# Patient Record
Sex: MALE | Race: WHITE | NOT HISPANIC OR LATINO | Employment: OTHER | URBAN - METROPOLITAN AREA
[De-identification: names, ages, dates, MRNs, and addresses within clinical notes are randomized per-mention and may not be internally consistent; named-entity substitution may affect disease eponyms.]

---

## 2023-03-20 ENCOUNTER — TELEPHONE (OUTPATIENT)
Dept: PRIMARY CARE | Facility: CLINIC | Age: 68
End: 2023-03-20
Payer: COMMERCIAL

## 2023-03-20 DIAGNOSIS — I10 HYPERTENSION, UNSPECIFIED TYPE: Primary | ICD-10-CM

## 2023-03-20 RX ORDER — LOSARTAN POTASSIUM 100 MG/1
50 TABLET ORAL DAILY
Qty: 90 TABLET | Refills: 3 | Status: SHIPPED | OUTPATIENT
Start: 2023-03-20 | End: 2023-05-30

## 2023-04-07 DIAGNOSIS — E78.5 HYPERLIPIDEMIA, UNSPECIFIED HYPERLIPIDEMIA TYPE: Primary | ICD-10-CM

## 2023-04-07 RX ORDER — ATORVASTATIN CALCIUM 40 MG/1
40 TABLET, FILM COATED ORAL NIGHTLY
COMMUNITY
End: 2023-04-07 | Stop reason: SDUPTHER

## 2023-04-08 RX ORDER — ATORVASTATIN CALCIUM 40 MG/1
40 TABLET, FILM COATED ORAL NIGHTLY
Qty: 90 TABLET | Refills: 0 | Status: SHIPPED | OUTPATIENT
Start: 2023-04-08 | End: 2023-10-02

## 2023-05-18 ENCOUNTER — APPOINTMENT (OUTPATIENT)
Dept: PRIMARY CARE | Facility: CLINIC | Age: 68
End: 2023-05-18
Payer: COMMERCIAL

## 2023-05-22 ENCOUNTER — TELEPHONE (OUTPATIENT)
Dept: PRIMARY CARE | Facility: CLINIC | Age: 68
End: 2023-05-22
Payer: COMMERCIAL

## 2023-05-22 DIAGNOSIS — Z00.00 WELL ADULT EXAM: Primary | ICD-10-CM

## 2023-05-24 PROBLEM — R00.1 BRADYCARDIA: Status: ACTIVE | Noted: 2023-05-24

## 2023-05-24 PROBLEM — E78.5 HYPERLIPIDEMIA: Status: ACTIVE | Noted: 2023-05-24

## 2023-05-24 PROBLEM — K21.9 GERD WITHOUT ESOPHAGITIS: Status: ACTIVE | Noted: 2023-05-24

## 2023-05-24 PROBLEM — M54.31 RIGHT SIDED SCIATICA: Status: ACTIVE | Noted: 2023-05-24

## 2023-05-24 PROBLEM — I25.10 CAD (CORONARY ARTERY DISEASE): Status: ACTIVE | Noted: 2023-05-24

## 2023-05-24 PROBLEM — L08.9 SKIN INFECTION: Status: ACTIVE | Noted: 2023-05-24

## 2023-05-24 PROBLEM — U07.1 COVID-19: Status: ACTIVE | Noted: 2023-05-24

## 2023-05-24 PROBLEM — J01.41 ACUTE RECURRENT PANSINUSITIS: Status: ACTIVE | Noted: 2023-05-24

## 2023-05-24 PROBLEM — I10 BENIGN ESSENTIAL HYPERTENSION: Status: ACTIVE | Noted: 2023-05-24

## 2023-05-24 PROBLEM — R07.9 CHEST PAIN: Status: ACTIVE | Noted: 2023-05-24

## 2023-05-24 PROBLEM — I48.91 A-FIB (MULTI): Status: ACTIVE | Noted: 2023-05-24

## 2023-05-24 RX ORDER — OXYCODONE HYDROCHLORIDE 5 MG/1
TABLET ORAL EVERY 4 HOURS PRN
COMMUNITY
Start: 2019-11-09 | End: 2023-05-30 | Stop reason: ALTCHOICE

## 2023-05-24 RX ORDER — LISINOPRIL 10 MG/1
10 TABLET ORAL DAILY
COMMUNITY
End: 2023-06-28 | Stop reason: SDUPTHER

## 2023-05-24 RX ORDER — ASPIRIN 81 MG/1
1 TABLET ORAL DAILY
COMMUNITY
End: 2024-01-23 | Stop reason: WASHOUT

## 2023-05-24 RX ORDER — ACETAMINOPHEN 325 MG/1
TABLET ORAL EVERY 6 HOURS PRN
COMMUNITY
Start: 2019-11-09 | End: 2023-08-16 | Stop reason: ALTCHOICE

## 2023-05-27 ENCOUNTER — LAB (OUTPATIENT)
Dept: LAB | Facility: LAB | Age: 68
End: 2023-05-27
Payer: COMMERCIAL

## 2023-05-27 DIAGNOSIS — Z00.00 WELL ADULT EXAM: ICD-10-CM

## 2023-05-27 LAB
ALANINE AMINOTRANSFERASE (SGPT) (U/L) IN SER/PLAS: 24 U/L (ref 10–52)
ALBUMIN (G/DL) IN SER/PLAS: 4.3 G/DL (ref 3.4–5)
ALKALINE PHOSPHATASE (U/L) IN SER/PLAS: 53 U/L (ref 33–136)
ANION GAP IN SER/PLAS: 10 MMOL/L (ref 10–20)
ASPARTATE AMINOTRANSFERASE (SGOT) (U/L) IN SER/PLAS: 23 U/L (ref 9–39)
BASOPHILS (10*3/UL) IN BLOOD BY AUTOMATED COUNT: 0.03 X10E9/L (ref 0–0.1)
BASOPHILS/100 LEUKOCYTES IN BLOOD BY AUTOMATED COUNT: 0.6 % (ref 0–2)
BILIRUBIN TOTAL (MG/DL) IN SER/PLAS: 0.9 MG/DL (ref 0–1.2)
CALCIUM (MG/DL) IN SER/PLAS: 9.2 MG/DL (ref 8.6–10.3)
CARBON DIOXIDE, TOTAL (MMOL/L) IN SER/PLAS: 31 MMOL/L (ref 21–32)
CHLORIDE (MMOL/L) IN SER/PLAS: 104 MMOL/L (ref 98–107)
CHOLESTEROL (MG/DL) IN SER/PLAS: 106 MG/DL (ref 0–199)
CHOLESTEROL IN HDL (MG/DL) IN SER/PLAS: 56 MG/DL
CHOLESTEROL/HDL RATIO: 1.9
CREATININE (MG/DL) IN SER/PLAS: 0.94 MG/DL (ref 0.5–1.3)
EOSINOPHILS (10*3/UL) IN BLOOD BY AUTOMATED COUNT: 0.18 X10E9/L (ref 0–0.7)
EOSINOPHILS/100 LEUKOCYTES IN BLOOD BY AUTOMATED COUNT: 3.7 % (ref 0–6)
ERYTHROCYTE DISTRIBUTION WIDTH (RATIO) BY AUTOMATED COUNT: 12.4 % (ref 11.5–14.5)
ERYTHROCYTE MEAN CORPUSCULAR HEMOGLOBIN CONCENTRATION (G/DL) BY AUTOMATED: 32.9 G/DL (ref 32–36)
ERYTHROCYTE MEAN CORPUSCULAR VOLUME (FL) BY AUTOMATED COUNT: 97 FL (ref 80–100)
ERYTHROCYTES (10*6/UL) IN BLOOD BY AUTOMATED COUNT: 4.81 X10E12/L (ref 4.5–5.9)
GFR MALE: 89 ML/MIN/1.73M2
GLUCOSE (MG/DL) IN SER/PLAS: 85 MG/DL (ref 74–99)
HEMATOCRIT (%) IN BLOOD BY AUTOMATED COUNT: 46.8 % (ref 41–52)
HEMOGLOBIN (G/DL) IN BLOOD: 15.4 G/DL (ref 13.5–17.5)
IMMATURE GRANULOCYTES/100 LEUKOCYTES IN BLOOD BY AUTOMATED COUNT: 0.2 % (ref 0–0.9)
LDL: 28 MG/DL (ref 0–99)
LEUKOCYTES (10*3/UL) IN BLOOD BY AUTOMATED COUNT: 4.9 X10E9/L (ref 4.4–11.3)
LYMPHOCYTES (10*3/UL) IN BLOOD BY AUTOMATED COUNT: 1.68 X10E9/L (ref 1.2–4.8)
LYMPHOCYTES/100 LEUKOCYTES IN BLOOD BY AUTOMATED COUNT: 34.2 % (ref 13–44)
MONOCYTES (10*3/UL) IN BLOOD BY AUTOMATED COUNT: 0.58 X10E9/L (ref 0.1–1)
MONOCYTES/100 LEUKOCYTES IN BLOOD BY AUTOMATED COUNT: 11.8 % (ref 2–10)
MUCUS, URINE: NORMAL /LPF
NEUTROPHILS (10*3/UL) IN BLOOD BY AUTOMATED COUNT: 2.43 X10E9/L (ref 1.2–7.7)
NEUTROPHILS/100 LEUKOCYTES IN BLOOD BY AUTOMATED COUNT: 49.5 % (ref 40–80)
NRBC (PER 100 WBCS) BY AUTOMATED COUNT: 0 /100 WBC (ref 0–0)
PLATELETS (10*3/UL) IN BLOOD AUTOMATED COUNT: 184 X10E9/L (ref 150–450)
POTASSIUM (MMOL/L) IN SER/PLAS: 5.7 MMOL/L (ref 3.5–5.3)
PROTEIN TOTAL: 5.8 G/DL (ref 6.4–8.2)
RBC, URINE: <1 /HPF (ref 0–5)
SODIUM (MMOL/L) IN SER/PLAS: 139 MMOL/L (ref 136–145)
TRIGLYCERIDE (MG/DL) IN SER/PLAS: 110 MG/DL (ref 0–149)
UREA NITROGEN (MG/DL) IN SER/PLAS: 19 MG/DL (ref 6–23)
VLDL: 22 MG/DL (ref 0–40)
WBC, URINE: 1 /HPF (ref 0–5)

## 2023-05-27 PROCEDURE — 80061 LIPID PANEL: CPT

## 2023-05-27 PROCEDURE — 85025 COMPLETE CBC W/AUTO DIFF WBC: CPT

## 2023-05-27 PROCEDURE — 84153 ASSAY OF PSA TOTAL: CPT

## 2023-05-27 PROCEDURE — 36415 COLL VENOUS BLD VENIPUNCTURE: CPT

## 2023-05-27 PROCEDURE — 80053 COMPREHEN METABOLIC PANEL: CPT

## 2023-05-27 PROCEDURE — 81001 URINALYSIS AUTO W/SCOPE: CPT

## 2023-05-28 LAB — PROSTATE SPECIFIC AG (NG/ML) IN SER/PLAS: 1.43 NG/ML (ref 0–4)

## 2023-05-30 ENCOUNTER — OFFICE VISIT (OUTPATIENT)
Dept: PRIMARY CARE | Facility: CLINIC | Age: 68
End: 2023-05-30
Payer: COMMERCIAL

## 2023-05-30 VITALS
HEIGHT: 71 IN | BODY MASS INDEX: 26.99 KG/M2 | DIASTOLIC BLOOD PRESSURE: 76 MMHG | WEIGHT: 192.8 LBS | SYSTOLIC BLOOD PRESSURE: 124 MMHG | OXYGEN SATURATION: 96 % | TEMPERATURE: 97.9 F | HEART RATE: 64 BPM

## 2023-05-30 DIAGNOSIS — I10 BENIGN ESSENTIAL HYPERTENSION: ICD-10-CM

## 2023-05-30 DIAGNOSIS — E78.5 HYPERLIPIDEMIA, UNSPECIFIED HYPERLIPIDEMIA TYPE: ICD-10-CM

## 2023-05-30 DIAGNOSIS — I25.118 CORONARY ARTERY DISEASE INVOLVING NATIVE CORONARY ARTERY OF NATIVE HEART WITH OTHER FORM OF ANGINA PECTORIS (CMS-HCC): ICD-10-CM

## 2023-05-30 DIAGNOSIS — Z00.00 MEDICARE ANNUAL WELLNESS VISIT, SUBSEQUENT: Primary | ICD-10-CM

## 2023-05-30 PROBLEM — K63.5 POLYP OF COLON: Status: ACTIVE | Noted: 2023-05-30

## 2023-05-30 PROBLEM — K21.9 GASTROESOPHAGEAL REFLUX DISEASE: Status: ACTIVE | Noted: 2023-05-30

## 2023-05-30 PROBLEM — K21.9 ESOPHAGEAL REFLUX: Status: ACTIVE | Noted: 2023-05-30

## 2023-05-30 PROCEDURE — 3078F DIAST BP <80 MM HG: CPT | Performed by: FAMILY MEDICINE

## 2023-05-30 PROCEDURE — 3074F SYST BP LT 130 MM HG: CPT | Performed by: FAMILY MEDICINE

## 2023-05-30 PROCEDURE — 99397 PER PM REEVAL EST PAT 65+ YR: CPT | Performed by: FAMILY MEDICINE

## 2023-05-30 PROCEDURE — 1159F MED LIST DOCD IN RCRD: CPT | Performed by: FAMILY MEDICINE

## 2023-05-30 PROCEDURE — G0439 PPPS, SUBSEQ VISIT: HCPCS | Performed by: FAMILY MEDICINE

## 2023-05-30 PROCEDURE — 1036F TOBACCO NON-USER: CPT | Performed by: FAMILY MEDICINE

## 2023-05-30 PROCEDURE — 3008F BODY MASS INDEX DOCD: CPT | Performed by: FAMILY MEDICINE

## 2023-05-30 PROCEDURE — 1160F RVW MEDS BY RX/DR IN RCRD: CPT | Performed by: FAMILY MEDICINE

## 2023-05-30 PROCEDURE — 99497 ADVNCD CARE PLAN 30 MIN: CPT | Performed by: FAMILY MEDICINE

## 2023-05-30 RX ORDER — ATENOLOL 25 MG/1
1 TABLET ORAL DAILY
COMMUNITY
Start: 2019-01-15 | End: 2023-05-30 | Stop reason: ALTCHOICE

## 2023-05-30 SDOH — ECONOMIC STABILITY: INCOME INSECURITY: IN THE LAST 12 MONTHS, WAS THERE A TIME WHEN YOU WERE NOT ABLE TO PAY THE MORTGAGE OR RENT ON TIME?: NO

## 2023-05-30 SDOH — ECONOMIC STABILITY: HOUSING INSECURITY
IN THE LAST 12 MONTHS, WAS THERE A TIME WHEN YOU DID NOT HAVE A STEADY PLACE TO SLEEP OR SLEPT IN A SHELTER (INCLUDING NOW)?: NO

## 2023-05-30 SDOH — ECONOMIC STABILITY: FOOD INSECURITY: WITHIN THE PAST 12 MONTHS, THE FOOD YOU BOUGHT JUST DIDN'T LAST AND YOU DIDN'T HAVE MONEY TO GET MORE.: NEVER TRUE

## 2023-05-30 SDOH — ECONOMIC STABILITY: TRANSPORTATION INSECURITY
IN THE PAST 12 MONTHS, HAS LACK OF TRANSPORTATION KEPT YOU FROM MEETINGS, WORK, OR FROM GETTING THINGS NEEDED FOR DAILY LIVING?: NO

## 2023-05-30 SDOH — ECONOMIC STABILITY: FOOD INSECURITY: WITHIN THE PAST 12 MONTHS, YOU WORRIED THAT YOUR FOOD WOULD RUN OUT BEFORE YOU GOT MONEY TO BUY MORE.: NEVER TRUE

## 2023-05-30 SDOH — ECONOMIC STABILITY: TRANSPORTATION INSECURITY
IN THE PAST 12 MONTHS, HAS THE LACK OF TRANSPORTATION KEPT YOU FROM MEDICAL APPOINTMENTS OR FROM GETTING MEDICATIONS?: NO

## 2023-05-30 ASSESSMENT — SOCIAL DETERMINANTS OF HEALTH (SDOH)
WITHIN THE LAST YEAR, HAVE YOU BEEN AFRAID OF YOUR PARTNER OR EX-PARTNER?: NO
WITHIN THE LAST YEAR, HAVE YOU BEEN KICKED, HIT, SLAPPED, OR OTHERWISE PHYSICALLY HURT BY YOUR PARTNER OR EX-PARTNER?: NO
WITHIN THE LAST YEAR, HAVE TO BEEN RAPED OR FORCED TO HAVE ANY KIND OF SEXUAL ACTIVITY BY YOUR PARTNER OR EX-PARTNER?: NO
HOW HARD IS IT FOR YOU TO PAY FOR THE VERY BASICS LIKE FOOD, HOUSING, MEDICAL CARE, AND HEATING?: NOT HARD AT ALL
WITHIN THE LAST YEAR, HAVE YOU BEEN HUMILIATED OR EMOTIONALLY ABUSED IN OTHER WAYS BY YOUR PARTNER OR EX-PARTNER?: NO

## 2023-05-30 ASSESSMENT — LIFESTYLE VARIABLES
HOW MANY STANDARD DRINKS CONTAINING ALCOHOL DO YOU HAVE ON A TYPICAL DAY: PATIENT DOES NOT DRINK
HOW OFTEN DO YOU HAVE SIX OR MORE DRINKS ON ONE OCCASION: NEVER
AUDIT-C TOTAL SCORE: 0
SKIP TO QUESTIONS 9-10: 1
HOW OFTEN DO YOU HAVE A DRINK CONTAINING ALCOHOL: NEVER

## 2023-05-30 ASSESSMENT — ENCOUNTER SYMPTOMS
MUSCULOSKELETAL NEGATIVE: 1
NEUROLOGICAL NEGATIVE: 1
OCCASIONAL FEELINGS OF UNSTEADINESS: 0
PSYCHIATRIC NEGATIVE: 1
DEPRESSION: 0
GASTROINTESTINAL NEGATIVE: 1
CONSTITUTIONAL NEGATIVE: 1
RESPIRATORY NEGATIVE: 1
LOSS OF SENSATION IN FEET: 0
CARDIOVASCULAR NEGATIVE: 1

## 2023-05-30 ASSESSMENT — PAIN SCALES - GENERAL: PAINLEVEL: 0-NO PAIN

## 2023-05-30 ASSESSMENT — PATIENT HEALTH QUESTIONNAIRE - PHQ9
2. FEELING DOWN, DEPRESSED OR HOPELESS: NOT AT ALL
SUM OF ALL RESPONSES TO PHQ9 QUESTIONS 1 & 2: 0
1. LITTLE INTEREST OR PLEASURE IN DOING THINGS: NOT AT ALL

## 2023-05-30 NOTE — PROGRESS NOTES
"Subjective   Patient ID: Prosper Mitchell is a 67 y.o. male who presents for Annual Exam (Annual medicare wellness bw already done).    HPI     Review of Systems   Constitutional: Negative.    HENT: Negative.     Respiratory: Negative.     Cardiovascular: Negative.    Gastrointestinal: Negative.    Genitourinary: Negative.    Musculoskeletal: Negative.    Neurological: Negative.    Psychiatric/Behavioral: Negative.         Objective   /76 (BP Location: Left arm)   Pulse 64   Temp 36.6 °C (97.9 °F) (Temporal)   Ht 1.791 m (5' 10.5\")   Wt 87.5 kg (192 lb 12.8 oz)   SpO2 96%   BMI 27.27 kg/m²     Physical Exam  Vitals and nursing note reviewed.   HENT:      Right Ear: Tympanic membrane normal.      Left Ear: Tympanic membrane normal.   Cardiovascular:      Rate and Rhythm: Normal rate and regular rhythm.      Pulses: Normal pulses.      Heart sounds: Normal heart sounds.   Pulmonary:      Breath sounds: Normal breath sounds.   Musculoskeletal:         General: Normal range of motion.   Neurological:      General: No focal deficit present.      Mental Status: He is alert and oriented to person, place, and time.   Psychiatric:         Mood and Affect: Mood normal.         Assessment/Plan   Problem List Items Addressed This Visit          Circulatory    Benign essential hypertension    CAD (coronary artery disease)       Other    Hyperlipidemia     Other Visit Diagnoses       Medicare annual wellness visit, subsequent    -  Primary    BMI 27.0-27.9,adult                   "

## 2023-06-28 DIAGNOSIS — I10 BENIGN ESSENTIAL HYPERTENSION: Primary | ICD-10-CM

## 2023-06-29 RX ORDER — LISINOPRIL 10 MG/1
10 TABLET ORAL DAILY
Qty: 90 TABLET | Refills: 0 | Status: SHIPPED | OUTPATIENT
Start: 2023-06-29 | End: 2023-08-03

## 2023-08-02 DIAGNOSIS — I10 BENIGN ESSENTIAL HYPERTENSION: ICD-10-CM

## 2023-08-03 RX ORDER — LISINOPRIL 10 MG/1
10 TABLET ORAL DAILY
Qty: 90 TABLET | Refills: 0 | Status: SHIPPED | OUTPATIENT
Start: 2023-08-03 | End: 2023-11-06

## 2023-08-16 ENCOUNTER — OFFICE VISIT (OUTPATIENT)
Dept: PRIMARY CARE | Facility: CLINIC | Age: 68
End: 2023-08-16
Payer: COMMERCIAL

## 2023-08-16 VITALS
WEIGHT: 190.6 LBS | BODY MASS INDEX: 26.96 KG/M2 | DIASTOLIC BLOOD PRESSURE: 62 MMHG | HEART RATE: 80 BPM | SYSTOLIC BLOOD PRESSURE: 122 MMHG | OXYGEN SATURATION: 97 %

## 2023-08-16 DIAGNOSIS — M62.830 SPASM OF THORACIC BACK MUSCLE: Primary | ICD-10-CM

## 2023-08-16 PROCEDURE — 1125F AMNT PAIN NOTED PAIN PRSNT: CPT | Performed by: STUDENT IN AN ORGANIZED HEALTH CARE EDUCATION/TRAINING PROGRAM

## 2023-08-16 PROCEDURE — 3078F DIAST BP <80 MM HG: CPT | Performed by: STUDENT IN AN ORGANIZED HEALTH CARE EDUCATION/TRAINING PROGRAM

## 2023-08-16 PROCEDURE — 3074F SYST BP LT 130 MM HG: CPT | Performed by: STUDENT IN AN ORGANIZED HEALTH CARE EDUCATION/TRAINING PROGRAM

## 2023-08-16 PROCEDURE — 1160F RVW MEDS BY RX/DR IN RCRD: CPT | Performed by: STUDENT IN AN ORGANIZED HEALTH CARE EDUCATION/TRAINING PROGRAM

## 2023-08-16 PROCEDURE — 1159F MED LIST DOCD IN RCRD: CPT | Performed by: STUDENT IN AN ORGANIZED HEALTH CARE EDUCATION/TRAINING PROGRAM

## 2023-08-16 PROCEDURE — 3008F BODY MASS INDEX DOCD: CPT | Performed by: STUDENT IN AN ORGANIZED HEALTH CARE EDUCATION/TRAINING PROGRAM

## 2023-08-16 PROCEDURE — 1036F TOBACCO NON-USER: CPT | Performed by: STUDENT IN AN ORGANIZED HEALTH CARE EDUCATION/TRAINING PROGRAM

## 2023-08-16 PROCEDURE — 99213 OFFICE O/P EST LOW 20 MIN: CPT | Performed by: STUDENT IN AN ORGANIZED HEALTH CARE EDUCATION/TRAINING PROGRAM

## 2023-08-16 RX ORDER — CYCLOBENZAPRINE HCL 10 MG
10 TABLET ORAL NIGHTLY PRN
Qty: 30 TABLET | Refills: 0 | Status: SHIPPED | OUTPATIENT
Start: 2023-08-16 | End: 2023-10-28 | Stop reason: ALTCHOICE

## 2023-08-16 ASSESSMENT — PAIN SCALES - GENERAL: PAINLEVEL: 4

## 2023-08-16 NOTE — PATIENT INSTRUCTIONS
1.  Midthoracic back spasms likely from somatic dysfunction of mid thoracic spine.  Would advise on anti-inflammatory Advil or Aleve or ibuprofen 2-3 times per day.  Can take muscle relaxer 10 mg p.o. nightly for spasms.  Advised on stretches and exercises as per handout twice per day.  If no improvement in the next 2 to 3 weeks please call we will consider x-rays of thoracic spine

## 2023-10-02 ENCOUNTER — TELEPHONE (OUTPATIENT)
Dept: CARDIOLOGY | Facility: CLINIC | Age: 68
End: 2023-10-02
Payer: COMMERCIAL

## 2023-10-02 DIAGNOSIS — E78.5 HYPERLIPIDEMIA, UNSPECIFIED HYPERLIPIDEMIA TYPE: Primary | ICD-10-CM

## 2023-10-02 RX ORDER — ROSUVASTATIN CALCIUM 10 MG/1
10 TABLET, COATED ORAL DAILY
Qty: 30 TABLET | Refills: 6 | Status: SHIPPED | OUTPATIENT
Start: 2023-10-02 | End: 2023-10-28 | Stop reason: ALTCHOICE

## 2023-10-02 NOTE — TELEPHONE ENCOUNTER
Spoke with patient and instructions provided. Patient verb understanding. Orders placed and med list updated.

## 2023-10-26 ENCOUNTER — TELEPHONE (OUTPATIENT)
Dept: CARDIOLOGY | Facility: CLINIC | Age: 68
End: 2023-10-26
Payer: COMMERCIAL

## 2023-10-26 NOTE — TELEPHONE ENCOUNTER
Patient having difficulty w/statins- cont'd myalgias. Currently on Rosuvastatin 10mg (prev on Atorvastatin 20mg and 40mg).   Please advise.

## 2023-10-28 ENCOUNTER — OFFICE VISIT (OUTPATIENT)
Dept: PRIMARY CARE | Facility: CLINIC | Age: 68
End: 2023-10-28
Payer: COMMERCIAL

## 2023-10-28 ENCOUNTER — LAB (OUTPATIENT)
Dept: LAB | Facility: LAB | Age: 68
End: 2023-10-28
Payer: COMMERCIAL

## 2023-10-28 VITALS
BODY MASS INDEX: 27.13 KG/M2 | WEIGHT: 191.8 LBS | DIASTOLIC BLOOD PRESSURE: 68 MMHG | OXYGEN SATURATION: 99 % | SYSTOLIC BLOOD PRESSURE: 138 MMHG | HEART RATE: 67 BPM

## 2023-10-28 DIAGNOSIS — Z78.9 STATIN INTOLERANCE: Primary | ICD-10-CM

## 2023-10-28 DIAGNOSIS — G72.9 MYOPATHY: ICD-10-CM

## 2023-10-28 PROCEDURE — 1125F AMNT PAIN NOTED PAIN PRSNT: CPT | Performed by: STUDENT IN AN ORGANIZED HEALTH CARE EDUCATION/TRAINING PROGRAM

## 2023-10-28 PROCEDURE — 1160F RVW MEDS BY RX/DR IN RCRD: CPT | Performed by: STUDENT IN AN ORGANIZED HEALTH CARE EDUCATION/TRAINING PROGRAM

## 2023-10-28 PROCEDURE — 99213 OFFICE O/P EST LOW 20 MIN: CPT | Performed by: STUDENT IN AN ORGANIZED HEALTH CARE EDUCATION/TRAINING PROGRAM

## 2023-10-28 PROCEDURE — 3078F DIAST BP <80 MM HG: CPT | Performed by: STUDENT IN AN ORGANIZED HEALTH CARE EDUCATION/TRAINING PROGRAM

## 2023-10-28 PROCEDURE — 1036F TOBACCO NON-USER: CPT | Performed by: STUDENT IN AN ORGANIZED HEALTH CARE EDUCATION/TRAINING PROGRAM

## 2023-10-28 PROCEDURE — 3075F SYST BP GE 130 - 139MM HG: CPT | Performed by: STUDENT IN AN ORGANIZED HEALTH CARE EDUCATION/TRAINING PROGRAM

## 2023-10-28 PROCEDURE — 3008F BODY MASS INDEX DOCD: CPT | Performed by: STUDENT IN AN ORGANIZED HEALTH CARE EDUCATION/TRAINING PROGRAM

## 2023-10-28 PROCEDURE — 1159F MED LIST DOCD IN RCRD: CPT | Performed by: STUDENT IN AN ORGANIZED HEALTH CARE EDUCATION/TRAINING PROGRAM

## 2023-10-28 NOTE — PATIENT INSTRUCTIONS
1.  Has developed multiple muscle aches over the past several months.  This occurred after being on atorvastatin 40 mg for about 4 years.  He is switched to Crestor 10 mg and still having the diffuse muscle aches throughout his body.  Has stopped the medication about 1 week ago still has the aches.  We will check arthritis panel as well as CK enzymes.  Advised to stay off statin at this time.  And then follow-up with cardiology to discuss alternatives in the future

## 2023-10-28 NOTE — PROGRESS NOTES
Subjective   Patient ID: Prosper Mitchell is a 68 y.o. male who presents for Muscle Pain.    HPI comes in with concern for statin myopathy    Review of Systems  Constitutional: NO F, chills, or sweats  Eyes: no blurred vision or visual disturbance  ENT: no hearing loss, no congestion, no nasal discharge, no hoarseness and no sore throat.   Cardiovascular: no chest pain, no edema, no palps and no syncope.   Respiratory: no cough,no s.o.b. and no wheezing  Gastrointestinal: no abdominal pain, No C/D no N/V, no blood in stools  Genitourinary: no dysuria, no change in urinary frequency, no urinary hesitancy and no feelings of urinary urgency.   Musculoskeletal: Generalized muscle aches for several months  Objective   /68 (BP Location: Left arm, Patient Position: Sitting, BP Cuff Size: Adult)   Pulse 67   Wt 87 kg (191 lb 12.8 oz)   SpO2 99%   BMI 27.13 kg/m²     Physical Exam  gen- a & o x 3, nad, pleasant    Assessment/Plan     1.  Has developed multiple muscle aches over the past several months.  This occurred after being on atorvastatin 40 mg for about 4 years.  He is switched to Crestor 10 mg and still having the diffuse muscle aches throughout his body.  Has stopped the medication about 1 week ago still has the aches.  We will check arthritis panel as well as CK enzymes.  Advised to stay off statin at this time.  And then follow-up with cardiology to discuss alternatives in the future

## 2023-10-30 ENCOUNTER — LAB (OUTPATIENT)
Dept: LAB | Facility: LAB | Age: 68
End: 2023-10-30
Payer: COMMERCIAL

## 2023-10-30 DIAGNOSIS — G72.9 MYOPATHY: ICD-10-CM

## 2023-10-30 DIAGNOSIS — Z78.9 STATIN INTOLERANCE: ICD-10-CM

## 2023-10-30 LAB
CK SERPL-CCNC: 122 U/L (ref 0–325)
ERYTHROCYTE [SEDIMENTATION RATE] IN BLOOD BY WESTERGREN METHOD: <1 MM/H (ref 0–20)
RHEUMATOID FACT SER NEPH-ACNC: <10 IU/ML (ref 0–15)
URATE SERPL-MCNC: 6.8 MG/DL (ref 4–7.5)

## 2023-10-30 PROCEDURE — 86038 ANTINUCLEAR ANTIBODIES: CPT

## 2023-10-30 PROCEDURE — 86431 RHEUMATOID FACTOR QUANT: CPT

## 2023-10-30 PROCEDURE — 82552 ASSAY OF CPK IN BLOOD: CPT

## 2023-10-30 PROCEDURE — 36415 COLL VENOUS BLD VENIPUNCTURE: CPT

## 2023-10-30 PROCEDURE — 82550 ASSAY OF CK (CPK): CPT

## 2023-10-30 PROCEDURE — 84550 ASSAY OF BLOOD/URIC ACID: CPT

## 2023-10-30 PROCEDURE — 85652 RBC SED RATE AUTOMATED: CPT

## 2023-10-31 LAB — ANA SER QL HEP2 SUBST: NEGATIVE

## 2023-11-02 LAB
CK BB CFR SERPL ELPH: 0 % (ref 0–0)
CK MACRO1 CFR SERPL: 0 % (ref 0–0)
CK MACRO2 CFR SERPL: 0 % (ref 0–0)
CK MB CFR SERPL ELPH: 0 % (ref 0–4)
CK MM CFR SERPL ELPH: 100 % (ref 96–100)
CK SERPL-CCNC: 136 U/L (ref 39–308)

## 2023-11-05 DIAGNOSIS — I10 BENIGN ESSENTIAL HYPERTENSION: ICD-10-CM

## 2023-11-06 ENCOUNTER — TELEPHONE (OUTPATIENT)
Dept: PRIMARY CARE | Facility: CLINIC | Age: 68
End: 2023-11-06
Payer: COMMERCIAL

## 2023-11-06 DIAGNOSIS — E78.5 HYPERLIPIDEMIA, UNSPECIFIED HYPERLIPIDEMIA TYPE: Primary | ICD-10-CM

## 2023-11-06 RX ORDER — PRAVASTATIN SODIUM 20 MG/1
20 TABLET ORAL DAILY
Qty: 30 TABLET | Refills: 5 | Status: SHIPPED | OUTPATIENT
Start: 2023-11-06 | End: 2024-01-23

## 2023-11-06 RX ORDER — LISINOPRIL 10 MG/1
10 TABLET ORAL DAILY
Qty: 90 TABLET | Refills: 0 | Status: SHIPPED | OUTPATIENT
Start: 2023-11-06 | End: 2024-03-18 | Stop reason: SDUPTHER

## 2023-11-13 ENCOUNTER — APPOINTMENT (OUTPATIENT)
Dept: RADIOLOGY | Facility: HOSPITAL | Age: 68
DRG: 816 | End: 2023-11-13
Payer: COMMERCIAL

## 2023-11-13 ENCOUNTER — HOSPITAL ENCOUNTER (INPATIENT)
Facility: HOSPITAL | Age: 68
LOS: 2 days | Discharge: HOME | DRG: 816 | End: 2023-11-15
Attending: STUDENT IN AN ORGANIZED HEALTH CARE EDUCATION/TRAINING PROGRAM | Admitting: SURGERY
Payer: COMMERCIAL

## 2023-11-13 ENCOUNTER — APPOINTMENT (OUTPATIENT)
Dept: VASCULAR MEDICINE | Facility: HOSPITAL | Age: 68
DRG: 816 | End: 2023-11-13
Payer: COMMERCIAL

## 2023-11-13 ENCOUNTER — APPOINTMENT (OUTPATIENT)
Dept: CARDIOLOGY | Facility: HOSPITAL | Age: 68
DRG: 816 | End: 2023-11-13
Payer: COMMERCIAL

## 2023-11-13 DIAGNOSIS — N28.9 KIDNEY LESION: ICD-10-CM

## 2023-11-13 DIAGNOSIS — R09.89 OTHER SPECIFIED SYMPTOMS AND SIGNS INVOLVING THE CIRCULATORY AND RESPIRATORY SYSTEMS: ICD-10-CM

## 2023-11-13 DIAGNOSIS — S36.039A SPLENIC LACERATION, INITIAL ENCOUNTER: Primary | ICD-10-CM

## 2023-11-13 DIAGNOSIS — R55 SYNCOPE, UNSPECIFIED SYNCOPE TYPE: ICD-10-CM

## 2023-11-13 LAB
ABO GROUP (TYPE) IN BLOOD: NORMAL
ALBUMIN SERPL BCP-MCNC: 4.3 G/DL (ref 3.4–5)
ALP SERPL-CCNC: 54 U/L (ref 33–136)
ALT SERPL W P-5'-P-CCNC: 17 U/L (ref 10–52)
ANION GAP SERPL CALC-SCNC: 13 MMOL/L (ref 10–20)
ANTIBODY SCREEN: NORMAL
AORTIC VALVE MEAN GRADIENT: 6
AORTIC VALVE PEAK VELOCITY: 1.89
APPEARANCE UR: CLEAR
AST SERPL W P-5'-P-CCNC: 15 U/L (ref 9–39)
AV PEAK GRADIENT: 14.3
AVA (PEAK VEL): 2.24
AVA (VTI): 2.75
BASOPHILS # BLD AUTO: 0.05 X10*3/UL (ref 0–0.1)
BASOPHILS NFR BLD AUTO: 0.3 %
BILIRUB SERPL-MCNC: 0.6 MG/DL (ref 0–1.2)
BILIRUB UR STRIP.AUTO-MCNC: NEGATIVE MG/DL
BUN SERPL-MCNC: 15 MG/DL (ref 6–23)
CALCIUM SERPL-MCNC: 9.3 MG/DL (ref 8.6–10.3)
CARDIAC TROPONIN I PNL SERPL HS: 3 NG/L (ref 0–20)
CARDIAC TROPONIN I PNL SERPL HS: 4 NG/L (ref 0–20)
CHLORIDE SERPL-SCNC: 104 MMOL/L (ref 98–107)
CO2 SERPL-SCNC: 30 MMOL/L (ref 21–32)
COLOR UR: YELLOW
CREAT SERPL-MCNC: 0.93 MG/DL (ref 0.5–1.3)
EJECTION FRACTION APICAL 4 CHAMBER: 75.9
EJECTION FRACTION: 64
EOSINOPHIL # BLD AUTO: 0.06 X10*3/UL (ref 0–0.7)
EOSINOPHIL NFR BLD AUTO: 0.4 %
ERYTHROCYTE [DISTWIDTH] IN BLOOD BY AUTOMATED COUNT: 12.3 % (ref 11.5–14.5)
ERYTHROCYTE [DISTWIDTH] IN BLOOD BY AUTOMATED COUNT: 12.7 % (ref 11.5–14.5)
FLUAV RNA RESP QL NAA+PROBE: NOT DETECTED
FLUBV RNA RESP QL NAA+PROBE: NOT DETECTED
GFR SERPL CREATININE-BSD FRML MDRD: 90 ML/MIN/1.73M*2
GLUCOSE BLD MANUAL STRIP-MCNC: 127 MG/DL (ref 74–99)
GLUCOSE SERPL-MCNC: 108 MG/DL (ref 74–99)
GLUCOSE UR STRIP.AUTO-MCNC: NEGATIVE MG/DL
HCT VFR BLD AUTO: 45.1 % (ref 41–52)
HCT VFR BLD AUTO: 45.6 % (ref 41–52)
HCT VFR BLD AUTO: 48.2 % (ref 41–52)
HCT VFR BLD AUTO: 49 % (ref 41–52)
HGB BLD-MCNC: 15.2 G/DL (ref 13.5–17.5)
HGB BLD-MCNC: 15.5 G/DL (ref 13.5–17.5)
HGB BLD-MCNC: 16.4 G/DL (ref 13.5–17.5)
HGB BLD-MCNC: 16.5 G/DL (ref 13.5–17.5)
IMM GRANULOCYTES # BLD AUTO: 0.1 X10*3/UL (ref 0–0.7)
IMM GRANULOCYTES NFR BLD AUTO: 0.6 % (ref 0–0.9)
KETONES UR STRIP.AUTO-MCNC: NEGATIVE MG/DL
LACTATE SERPL-SCNC: 1.8 MMOL/L (ref 0.4–2)
LEFT ATRIUM VOLUME AREA LENGTH INDEX BSA: 34.8
LEFT VENTRICLE INTERNAL DIMENSION DIASTOLE: 4.71 (ref 3.5–6)
LEFT VENTRICULAR OUTFLOW TRACT DIAMETER: 2
LEUKOCYTE ESTERASE UR QL STRIP.AUTO: NEGATIVE
LYMPHOCYTES # BLD AUTO: 1.56 X10*3/UL (ref 1.2–4.8)
LYMPHOCYTES NFR BLD AUTO: 10.1 %
MAGNESIUM SERPL-MCNC: 1.83 MG/DL (ref 1.6–2.4)
MCH RBC QN AUTO: 31.8 PG (ref 26–34)
MCH RBC QN AUTO: 31.9 PG (ref 26–34)
MCHC RBC AUTO-ENTMCNC: 33.3 G/DL (ref 32–36)
MCHC RBC AUTO-ENTMCNC: 34 G/DL (ref 32–36)
MCV RBC AUTO: 94 FL (ref 80–100)
MCV RBC AUTO: 95 FL (ref 80–100)
MITRAL VALVE E/A RATIO: 1.19
MONOCYTES # BLD AUTO: 1.39 X10*3/UL (ref 0.1–1)
MONOCYTES NFR BLD AUTO: 9 %
NEUTROPHILS # BLD AUTO: 12.36 X10*3/UL (ref 1.2–7.7)
NEUTROPHILS NFR BLD AUTO: 79.6 %
NITRITE UR QL STRIP.AUTO: NEGATIVE
NRBC BLD-RTO: 0 /100 WBCS (ref 0–0)
NRBC BLD-RTO: 0 /100 WBCS (ref 0–0)
PH UR STRIP.AUTO: 8.5 [PH]
PLATELET # BLD AUTO: 177 X10*3/UL (ref 150–450)
PLATELET # BLD AUTO: 177 X10*3/UL (ref 150–450)
POTASSIUM SERPL-SCNC: 4.6 MMOL/L (ref 3.5–5.3)
PROT SERPL-MCNC: 5.8 G/DL (ref 6.4–8.2)
PROT UR STRIP.AUTO-MCNC: ABNORMAL MG/DL
RBC # BLD AUTO: 4.78 X10*6/UL (ref 4.5–5.9)
RBC # BLD AUTO: 5.14 X10*6/UL (ref 4.5–5.9)
RBC # UR STRIP.AUTO: NEGATIVE /UL
RBC #/AREA URNS AUTO: NORMAL /HPF
RH FACTOR (ANTIGEN D): NORMAL
RIGHT VENTRICLE FREE WALL PEAK S': 15.9
RIGHT VENTRICLE PEAK SYSTOLIC PRESSURE: 40.9
SARS-COV-2 RNA RESP QL NAA+PROBE: NOT DETECTED
SODIUM SERPL-SCNC: 142 MMOL/L (ref 136–145)
SP GR UR STRIP.AUTO: <1.005
TRICUSPID ANNULAR PLANE SYSTOLIC EXCURSION: 1.9
UROBILINOGEN UR STRIP.AUTO-MCNC: ABNORMAL MG/DL
WBC # BLD AUTO: 15.5 X10*3/UL (ref 4.4–11.3)
WBC # BLD AUTO: 9.9 X10*3/UL (ref 4.4–11.3)
WBC #/AREA URNS AUTO: NORMAL /HPF

## 2023-11-13 PROCEDURE — 74174 CTA ABD&PLVS W/CONTRAST: CPT | Performed by: RADIOLOGY

## 2023-11-13 PROCEDURE — 2500000001 HC RX 250 WO HCPCS SELF ADMINISTERED DRUGS (ALT 637 FOR MEDICARE OP): Performed by: STUDENT IN AN ORGANIZED HEALTH CARE EDUCATION/TRAINING PROGRAM

## 2023-11-13 PROCEDURE — 2500000004 HC RX 250 GENERAL PHARMACY W/ HCPCS (ALT 636 FOR OP/ED): Performed by: REGISTERED NURSE

## 2023-11-13 PROCEDURE — 83735 ASSAY OF MAGNESIUM: CPT | Performed by: STUDENT IN AN ORGANIZED HEALTH CARE EDUCATION/TRAINING PROGRAM

## 2023-11-13 PROCEDURE — 99222 1ST HOSP IP/OBS MODERATE 55: CPT

## 2023-11-13 PROCEDURE — 87040 BLOOD CULTURE FOR BACTERIA: CPT | Mod: PARLAB | Performed by: STUDENT IN AN ORGANIZED HEALTH CARE EDUCATION/TRAINING PROGRAM

## 2023-11-13 PROCEDURE — 93306 TTE W/DOPPLER COMPLETE: CPT

## 2023-11-13 PROCEDURE — 74174 CTA ABD&PLVS W/CONTRAST: CPT

## 2023-11-13 PROCEDURE — 72131 CT LUMBAR SPINE W/O DYE: CPT

## 2023-11-13 PROCEDURE — 82947 ASSAY GLUCOSE BLOOD QUANT: CPT

## 2023-11-13 PROCEDURE — 70450 CT HEAD/BRAIN W/O DYE: CPT | Performed by: RADIOLOGY

## 2023-11-13 PROCEDURE — 85018 HEMOGLOBIN: CPT | Performed by: REGISTERED NURSE

## 2023-11-13 PROCEDURE — 84484 ASSAY OF TROPONIN QUANT: CPT | Performed by: STUDENT IN AN ORGANIZED HEALTH CARE EDUCATION/TRAINING PROGRAM

## 2023-11-13 PROCEDURE — 99223 1ST HOSP IP/OBS HIGH 75: CPT | Performed by: STUDENT IN AN ORGANIZED HEALTH CARE EDUCATION/TRAINING PROGRAM

## 2023-11-13 PROCEDURE — 83605 ASSAY OF LACTIC ACID: CPT | Performed by: REGISTERED NURSE

## 2023-11-13 PROCEDURE — 99222 1ST HOSP IP/OBS MODERATE 55: CPT | Performed by: REGISTERED NURSE

## 2023-11-13 PROCEDURE — C9113 INJ PANTOPRAZOLE SODIUM, VIA: HCPCS | Performed by: REGISTERED NURSE

## 2023-11-13 PROCEDURE — 72125 CT NECK SPINE W/O DYE: CPT | Performed by: RADIOLOGY

## 2023-11-13 PROCEDURE — 85014 HEMATOCRIT: CPT | Performed by: REGISTERED NURSE

## 2023-11-13 PROCEDURE — 72131 CT LUMBAR SPINE W/O DYE: CPT | Performed by: RADIOLOGY

## 2023-11-13 PROCEDURE — 93880 EXTRACRANIAL BILAT STUDY: CPT

## 2023-11-13 PROCEDURE — 96375 TX/PRO/DX INJ NEW DRUG ADDON: CPT

## 2023-11-13 PROCEDURE — 72128 CT CHEST SPINE W/O DYE: CPT

## 2023-11-13 PROCEDURE — 93010 ELECTROCARDIOGRAM REPORT: CPT | Performed by: INTERNAL MEDICINE

## 2023-11-13 PROCEDURE — 2500000004 HC RX 250 GENERAL PHARMACY W/ HCPCS (ALT 636 FOR OP/ED): Performed by: STUDENT IN AN ORGANIZED HEALTH CARE EDUCATION/TRAINING PROGRAM

## 2023-11-13 PROCEDURE — 93306 TTE W/DOPPLER COMPLETE: CPT | Performed by: INTERNAL MEDICINE

## 2023-11-13 PROCEDURE — 93880 EXTRACRANIAL BILAT STUDY: CPT | Performed by: SURGERY

## 2023-11-13 PROCEDURE — 71045 X-RAY EXAM CHEST 1 VIEW: CPT | Performed by: RADIOLOGY

## 2023-11-13 PROCEDURE — 85027 COMPLETE CBC AUTOMATED: CPT | Performed by: REGISTERED NURSE

## 2023-11-13 PROCEDURE — 2550000001 HC RX 255 CONTRASTS: Performed by: STUDENT IN AN ORGANIZED HEALTH CARE EDUCATION/TRAINING PROGRAM

## 2023-11-13 PROCEDURE — 81001 URINALYSIS AUTO W/SCOPE: CPT | Performed by: STUDENT IN AN ORGANIZED HEALTH CARE EDUCATION/TRAINING PROGRAM

## 2023-11-13 PROCEDURE — 80053 COMPREHEN METABOLIC PANEL: CPT | Performed by: STUDENT IN AN ORGANIZED HEALTH CARE EDUCATION/TRAINING PROGRAM

## 2023-11-13 PROCEDURE — 87636 SARSCOV2 & INF A&B AMP PRB: CPT | Performed by: STUDENT IN AN ORGANIZED HEALTH CARE EDUCATION/TRAINING PROGRAM

## 2023-11-13 PROCEDURE — 36415 COLL VENOUS BLD VENIPUNCTURE: CPT | Performed by: STUDENT IN AN ORGANIZED HEALTH CARE EDUCATION/TRAINING PROGRAM

## 2023-11-13 PROCEDURE — 72128 CT CHEST SPINE W/O DYE: CPT | Performed by: RADIOLOGY

## 2023-11-13 PROCEDURE — 71045 X-RAY EXAM CHEST 1 VIEW: CPT

## 2023-11-13 PROCEDURE — 96374 THER/PROPH/DIAG INJ IV PUSH: CPT

## 2023-11-13 PROCEDURE — 85025 COMPLETE CBC W/AUTO DIFF WBC: CPT | Performed by: STUDENT IN AN ORGANIZED HEALTH CARE EDUCATION/TRAINING PROGRAM

## 2023-11-13 PROCEDURE — 99291 CRITICAL CARE FIRST HOUR: CPT | Performed by: STUDENT IN AN ORGANIZED HEALTH CARE EDUCATION/TRAINING PROGRAM

## 2023-11-13 PROCEDURE — 86901 BLOOD TYPING SEROLOGIC RH(D): CPT | Performed by: STUDENT IN AN ORGANIZED HEALTH CARE EDUCATION/TRAINING PROGRAM

## 2023-11-13 PROCEDURE — 71275 CT ANGIOGRAPHY CHEST: CPT | Performed by: RADIOLOGY

## 2023-11-13 PROCEDURE — 2020000001 HC ICU ROOM DAILY

## 2023-11-13 PROCEDURE — 70450 CT HEAD/BRAIN W/O DYE: CPT

## 2023-11-13 PROCEDURE — 72125 CT NECK SPINE W/O DYE: CPT

## 2023-11-13 RX ORDER — PRAVASTATIN SODIUM 20 MG/1
40 TABLET ORAL NIGHTLY
Status: DISCONTINUED | OUTPATIENT
Start: 2023-11-13 | End: 2023-11-15 | Stop reason: HOSPADM

## 2023-11-13 RX ORDER — ONDANSETRON HYDROCHLORIDE 2 MG/ML
4 INJECTION, SOLUTION INTRAVENOUS EVERY 8 HOURS PRN
Status: DISCONTINUED | OUTPATIENT
Start: 2023-11-13 | End: 2023-11-15 | Stop reason: HOSPADM

## 2023-11-13 RX ORDER — ONDANSETRON 4 MG/1
4 TABLET, FILM COATED ORAL EVERY 8 HOURS PRN
Status: DISCONTINUED | OUTPATIENT
Start: 2023-11-13 | End: 2023-11-15 | Stop reason: HOSPADM

## 2023-11-13 RX ORDER — NAPROXEN SODIUM 220 MG/1
324 TABLET, FILM COATED ORAL ONCE
Status: COMPLETED | OUTPATIENT
Start: 2023-11-13 | End: 2023-11-13

## 2023-11-13 RX ORDER — DOPAMINE HYDROCHLORIDE 160 MG/100ML
5-10 INJECTION, SOLUTION INTRAVENOUS CONTINUOUS
Status: DISCONTINUED | OUTPATIENT
Start: 2023-11-13 | End: 2023-11-14

## 2023-11-13 RX ORDER — ACETAMINOPHEN 325 MG/1
975 TABLET ORAL EVERY 6 HOURS PRN
Status: DISCONTINUED | OUTPATIENT
Start: 2023-11-13 | End: 2023-11-15 | Stop reason: HOSPADM

## 2023-11-13 RX ORDER — DEXTROSE MONOHYDRATE AND SODIUM CHLORIDE 5; .45 G/100ML; G/100ML
75 INJECTION, SOLUTION INTRAVENOUS CONTINUOUS
Status: DISCONTINUED | OUTPATIENT
Start: 2023-11-13 | End: 2023-11-14

## 2023-11-13 RX ORDER — ATROPINE SULFATE 0.1 MG/ML
1 INJECTION INTRAVENOUS ONCE
Status: COMPLETED | OUTPATIENT
Start: 2023-11-13 | End: 2023-11-13

## 2023-11-13 RX ORDER — POLYETHYLENE GLYCOL 3350 17 G/17G
17 POWDER, FOR SOLUTION ORAL DAILY PRN
Status: DISCONTINUED | OUTPATIENT
Start: 2023-11-13 | End: 2023-11-15 | Stop reason: HOSPADM

## 2023-11-13 RX ORDER — ONDANSETRON HYDROCHLORIDE 2 MG/ML
4 INJECTION, SOLUTION INTRAVENOUS ONCE
Status: COMPLETED | OUTPATIENT
Start: 2023-11-13 | End: 2023-11-13

## 2023-11-13 RX ORDER — PANTOPRAZOLE SODIUM 40 MG/10ML
40 INJECTION, POWDER, LYOPHILIZED, FOR SOLUTION INTRAVENOUS DAILY
Status: DISCONTINUED | OUTPATIENT
Start: 2023-11-13 | End: 2023-11-15 | Stop reason: HOSPADM

## 2023-11-13 RX ORDER — ATROPINE SULFATE 0.1 MG/ML
1 INJECTION INTRAVENOUS ONCE
Status: DISCONTINUED | OUTPATIENT
Start: 2023-11-13 | End: 2023-11-15 | Stop reason: HOSPADM

## 2023-11-13 RX ORDER — MAGNESIUM SULFATE HEPTAHYDRATE 40 MG/ML
2 INJECTION, SOLUTION INTRAVENOUS ONCE
Status: COMPLETED | OUTPATIENT
Start: 2023-11-13 | End: 2023-11-13

## 2023-11-13 RX ORDER — DOPAMINE HYDROCHLORIDE 160 MG/100ML
INJECTION, SOLUTION INTRAVENOUS
Status: DISPENSED
Start: 2023-11-13 | End: 2023-11-13

## 2023-11-13 RX ADMIN — MAGNESIUM SULFATE HEPTAHYDRATE 2 G: 40 INJECTION, SOLUTION INTRAVENOUS at 11:06

## 2023-11-13 RX ADMIN — PANTOPRAZOLE SODIUM 40 MG: 40 INJECTION, POWDER, FOR SOLUTION INTRAVENOUS at 11:06

## 2023-11-13 RX ADMIN — ONDANSETRON 4 MG: 2 INJECTION INTRAMUSCULAR; INTRAVENOUS at 05:43

## 2023-11-13 RX ADMIN — Medication 1.5 G: at 23:34

## 2023-11-13 RX ADMIN — Medication 1.5 G: at 09:45

## 2023-11-13 RX ADMIN — ASPIRIN 81 MG CHEWABLE TABLET 324 MG: 81 TABLET CHEWABLE at 05:39

## 2023-11-13 RX ADMIN — DEXTROSE AND SODIUM CHLORIDE 75 ML/HR: 5; 450 INJECTION, SOLUTION INTRAVENOUS at 11:06

## 2023-11-13 RX ADMIN — Medication 1.5 G: at 17:41

## 2023-11-13 RX ADMIN — ATROPINE SULFATE 1 MG: 0.1 INJECTION INTRAVENOUS at 05:14

## 2023-11-13 RX ADMIN — SODIUM CHLORIDE 1000 ML: 9 INJECTION, SOLUTION INTRAVENOUS at 05:41

## 2023-11-13 RX ADMIN — IOHEXOL 140 ML: 350 INJECTION, SOLUTION INTRAVENOUS at 05:35

## 2023-11-13 SDOH — SOCIAL STABILITY: SOCIAL INSECURITY: HAVE YOU HAD THOUGHTS OF HARMING ANYONE ELSE?: NO

## 2023-11-13 ASSESSMENT — LIFESTYLE VARIABLES
AUDIT-C TOTAL SCORE: 0
HOW OFTEN DO YOU HAVE 6 OR MORE DRINKS ON ONE OCCASION: NEVER
EVER FELT BAD OR GUILTY ABOUT YOUR DRINKING: NO
HOW MANY STANDARD DRINKS CONTAINING ALCOHOL DO YOU HAVE ON A TYPICAL DAY: PATIENT DOES NOT DRINK
EVER HAD A DRINK FIRST THING IN THE MORNING TO STEADY YOUR NERVES TO GET RID OF A HANGOVER: NO
AUDIT-C TOTAL SCORE: 0
HAVE PEOPLE ANNOYED YOU BY CRITICIZING YOUR DRINKING: NO
HAVE YOU EVER FELT YOU SHOULD CUT DOWN ON YOUR DRINKING: NO
REASON UNABLE TO ASSESS: NO
SKIP TO QUESTIONS 9-10: 1
HOW OFTEN DO YOU HAVE A DRINK CONTAINING ALCOHOL: NEVER
SUBSTANCE_ABUSE_PAST_12_MONTHS: NO
PRESCIPTION_ABUSE_PAST_12_MONTHS: NO

## 2023-11-13 ASSESSMENT — ACTIVITIES OF DAILY LIVING (ADL)
WALKS IN HOME: INDEPENDENT
LACK_OF_TRANSPORTATION: NO
HEARING - RIGHT EAR: FUNCTIONAL
BATHING: INDEPENDENT
JUDGMENT_ADEQUATE_SAFELY_COMPLETE_DAILY_ACTIVITIES: YES
LACK_OF_TRANSPORTATION: NO
HEARING - LEFT EAR: FUNCTIONAL
FEEDING YOURSELF: INDEPENDENT
ADEQUATE_TO_COMPLETE_ADL: YES
GROOMING: INDEPENDENT
TOILETING: INDEPENDENT
DRESSING YOURSELF: INDEPENDENT
PATIENT'S MEMORY ADEQUATE TO SAFELY COMPLETE DAILY ACTIVITIES?: YES

## 2023-11-13 ASSESSMENT — PAIN DESCRIPTION - PROGRESSION: CLINICAL_PROGRESSION: RESOLVED

## 2023-11-13 ASSESSMENT — COGNITIVE AND FUNCTIONAL STATUS - GENERAL
WALKING IN HOSPITAL ROOM: A LITTLE
CLIMB 3 TO 5 STEPS WITH RAILING: A LITTLE
DAILY ACTIVITIY SCORE: 24
PATIENT BASELINE BEDBOUND: NO
MOBILITY SCORE: 22

## 2023-11-13 ASSESSMENT — PAIN SCALES - GENERAL: PAINLEVEL_OUTOF10: 0 - NO PAIN

## 2023-11-13 ASSESSMENT — PATIENT HEALTH QUESTIONNAIRE - PHQ9
2. FEELING DOWN, DEPRESSED OR HOPELESS: NOT AT ALL
1. LITTLE INTEREST OR PLEASURE IN DOING THINGS: NOT AT ALL
SUM OF ALL RESPONSES TO PHQ9 QUESTIONS 1 & 2: 0

## 2023-11-13 ASSESSMENT — PAIN - FUNCTIONAL ASSESSMENT
PAIN_FUNCTIONAL_ASSESSMENT: 0-10
PAIN_FUNCTIONAL_ASSESSMENT: 0-10

## 2023-11-13 ASSESSMENT — COLUMBIA-SUICIDE SEVERITY RATING SCALE - C-SSRS
6. HAVE YOU EVER DONE ANYTHING, STARTED TO DO ANYTHING, OR PREPARED TO DO ANYTHING TO END YOUR LIFE?: NO
2. HAVE YOU ACTUALLY HAD ANY THOUGHTS OF KILLING YOURSELF?: NO
1. IN THE PAST MONTH, HAVE YOU WISHED YOU WERE DEAD OR WISHED YOU COULD GO TO SLEEP AND NOT WAKE UP?: NO

## 2023-11-13 NOTE — ED PROVIDER NOTES
HPI   Chief Complaint   Patient presents with    Syncope     Pt states he had gone to the bathroom and after that went to go to his room. He states that he felt hot and clammy. Wife states he was grey/white and passed out. Pt lost control of bladder/bowel       Patient is a 68-year-old male past medical history of hypertension coronary artery disease status post CABG presents for syncope.  He has been feeling unwell for the past few days.  He states that he went to use the bathroom felt lightheaded presented back to his bedroom where his wife watched him syncopized.  There is no seizure activity noted.  Patient did have bladder and bowel incontinence.  He denies any headache vision changes numbness weakness tingling arms or legs.  He has no chest pain or shortness of breath.  He states he had no palpitations or chest pain after the syncopal episodes however prior to comprising he did feel like he was about to pass out.  Patient reports no other history at this time.                          No data recorded                Patient History   Past Medical History:   Diagnosis Date    Abnormal findings on diagnostic imaging of other specified body structures     Abnormal CT of the chest    Other conditions influencing health status     Normal cardiac stress test    Personal history of other diseases of the circulatory system     History of abnormal electrocardiography    Personal history of other medical treatment     History of cardiac monitoring    Personal history of other medical treatment     History of echocardiogram    Personal history of other medical treatment     History of nuclear stress test    Personal history of other medical treatment     H/O Doppler ultrasound     Past Surgical History:   Procedure Laterality Date    HERNIA REPAIR  12/02/2019    Hernia Repair    OTHER SURGICAL HISTORY  12/02/2019    Coronary artery bypass graft     No family history on file.  Social History     Tobacco Use    Smoking  status: Never    Smokeless tobacco: Never   Substance Use Topics    Alcohol use: Never    Drug use: Never       Physical Exam   ED Triage Vitals [11/13/23 0424]   Temp Heart Rate Resp BP   36.5 °C (97.7 °F) 63 18 160/77      SpO2 Temp src Heart Rate Source Patient Position   96 % -- -- --      BP Location FiO2 (%)     -- --       Physical Exam  Vitals reviewed.   Constitutional:       Appearance: Normal appearance.   HENT:      Head: Normocephalic and atraumatic.      Nose: Nose normal.      Mouth/Throat:      Mouth: Mucous membranes are moist.   Eyes:      Extraocular Movements: Extraocular movements intact.      Conjunctiva/sclera: Conjunctivae normal.   Cardiovascular:      Rate and Rhythm: Normal rate and regular rhythm.      Pulses: Normal pulses.      Heart sounds: Normal heart sounds.   Pulmonary:      Effort: Pulmonary effort is normal.      Breath sounds: Normal breath sounds.   Abdominal:      General: Abdomen is flat. Bowel sounds are normal.      Palpations: Abdomen is soft.   Musculoskeletal:         General: Normal range of motion.      Cervical back: Normal. No bony tenderness.      Thoracic back: Normal. No bony tenderness.      Lumbar back: Normal. No bony tenderness.   Skin:     General: Skin is warm and dry.   Neurological:      Mental Status: He is alert and oriented to person, place, and time. Mental status is at baseline.      Cranial Nerves: Cranial nerves 2-12 are intact. No cranial nerve deficit.      Sensory: Sensation is intact.      Motor: Motor function is intact. No weakness.   Psychiatric:         Mood and Affect: Mood normal.         ED Course & MDM   ED Course as of 11/13/23 0643   Mon Nov 13, 2023   0446 68-year-old history of hypertension coronary disease status post CABG for syncope.  On examination vital signs are stable patient's cranial nerves II through XII intact light to sensation grossly tact in all 4 extremities no midline tenderness of spine no paraspinal tenderness  regular rate and rhythm no murmurs appreciated differential diagnosis includes vasovagal orthostatic syncope.  Less likely to be neurogenic cardiogenic as patient had prodromal symptoms however work-up includes EKG troponin orthostatic vital signs CBC CMP magnesium CT imaging of the head and C-spine and chest x-ray.  Patient will also have viral swabs including COVID-19 and influenza ordered at this time. [ZS]   0450 EKG interpreted by me shows sinus rhythm no acute STEMI.  Ventricular 57  QRS 86 QTc 404 [ZS]   0513 Nurse asked me to evaluate patient at bedside patient was found to have heart rate in the 40s blood pressure initially was in the 70s now improved and patient is complaining of chest pain going into his back.  He was given a milligram of atropine.  CTA of the chest abdomen pelvis has been ordered. [ZS]   0540 Reviewed new EKG which showed depressions in V3 through V6 with no reciprocal ST elevations.  aVR component was mildly elevated however less than 1 mm.  Dr. Hopson reviewed EKG and does not think patient requires STEMI activation he was given aspirin.  CTA on my interpretation showed no evidence of dissection awaiting official read at this time.  Patient's back pain and chest pain resolved.  We will continue to monitor him closely dopamine is ordered for bedside in case patient becomes bradycardic or hypotensive again. [ZS]   0549 CMP shows no acute findings. [ZS]   0549 CT imaging head and C-spine shows no acute traumatic injuries on my interpretation. [ZS]   0549 CT cervical spine wo IV contrast [ZS]   0549 CT head wo IV contrast [ZS]   0614 Repeat EKG shows sinus rhythm ventricular 104  QRS 80 QTc 481 with a single PVC ischemic changes are now resolved.  Initial troponin was negative. [ZS]   0636 Radiology called stating the patient has a grade 2 splenic laceration with fluid in abdomen.  Case was discussed with trauma surgeon Dr. Meza who states patient does not require any  platelet transfusions at this time due to him receiving aspirin earlier in the evening.  He recommended admission to the ICU.  Recon studies of the thoracic and lumbar spine have been ordered.  There is other incidental findings appreciated.  Primary service will follow. [ZS]      ED Course User Index  [ZS] Petra Potter MD         Diagnoses as of 11/13/23 0643   Splenic laceration, initial encounter   Kidney lesion   Syncope, unspecified syncope type       Medical Decision Making      Procedure  Critical Care    Performed by: Petra Potter MD  Authorized by: Petra Potter MD    Critical care provider statement:     Critical care time (minutes):  35    Critical care time was exclusive of:  Separately billable procedures and treating other patients    Critical care was necessary to treat or prevent imminent or life-threatening deterioration of the following conditions:  Trauma    Critical care was time spent personally by me on the following activities:  Development of treatment plan with patient or surrogate, evaluation of patient's response to treatment, discussions with primary provider, examination of patient, ordering and performing treatments and interventions, ordering and review of laboratory studies, ordering and review of radiographic studies and re-evaluation of patient's condition    Care discussed with: admitting provider         Petra Potter MD  11/13/23 0645

## 2023-11-13 NOTE — CONSULTS
Reason for consult to Cardiology:  Syncope, bradycardia     HPI:  Prosper Mitchell is a 68-year-old male with a PMHx positive for HTN, HLD, CAD s/p CABG x4 (11/2019), postop A-fib, GERD, recurrent pansinusitis, right-sided sciatica who presented from home to [facility] ED following a syncopal event prior to arrival.  Earlier in the morning today, patient got out of bed just before 4am to use the bathroom. He recalls having a normal BM (did not see color), but does not believe it was loose or diarrhea. When walking back to bedroom, patient began to feel very unwell: clammy, hot, lightheaded, nauseated.  His wife attempted to help him back into bed, but he lost consciousness on the way and slumped over.  Patient's wife recalls that her  walked loudly into the bedroom door waking her up.  She asked him what was wrong, but he provided no verbal answer.  She tried to help him into bed, but patient ultimately fell/slumped to the floor.  Wife states that he did not hit anything during fall.  There was no witnessed seizure activity.  He did have bowel and bladder incontinence.  Per wife, patient had LOC for a couple minutes at most.  Patient states he had been in his usual state of health up until this event.  However, upon ROS, he does report periumbilical abdominal pain with diarrhea overnight.  He otherwise denies any chest pain, palpitations, or SOB.  Additionally, he has also had myalgias back pain for the last few months, which he attributes to statin use.  Patient had a recent PCP appointment on 10/28 for generalized muscle aches of several months duration.  Per note, patient developed multiple muscle aches over the past several months after being on Lipitor 40 for 4 years.  He was switched to Crestor 10, continued to experience muscle aches, discontinued the medication 1 week prior to the appointment, yet continues to experience muscle aches.  Was advised to stay off the statin and to follow-up with  cardiology.    Upon arrival to the ED, patient was HDS with SpO2 89% which improved to mid to high 90s on RA without supplemental O2 with initial labs unremarkable with the exception of a leukocytosis of 15.5.  Troponin 3, repeat troponin 4.  Lactate 1.8.  ECG demonstrated sinus tachycardia with ST depressions in V3 through V6 (anterolateral leads), AVR component mildly elevated however less than 1mm.  CT head and C-spine negative for acute ICH or fracture.  During ED course, patient became acutely bradycardic with HR of 49 and hypotensive with BP 71/45 in the setting of severe chest and back pain.  He subsequently underwent CTA chest/abdomen/pelvis which showed U-shape stenotic configuration with post dilation of celiac artery; grade 2 splenic injury with small amount of perisplenic free fluid; infectious vs inflammatory vs ischemic enteritis; diffuse wall thickening of ascending and transverse colon, concerning for underdistention vs colitis; small amount of free fluid in abdomen/pelvis; and 2.2 x 2.2 cm enhancing left renal lesion.  CT T-spine negative for fracture.  CT L-spine with question of possible lytic right sacral lesion and lucent although benign-appearing left iliac lesion and sclerotic, benign-appearing L2 lesion.  He did receive atropine 1 mg x 1,  mg, Zofran 4mg, and 1L NS bolus in the ED.  Dopamine was ordered in case he became bradycardic or hypotensive again.  Patient was admitted to the ICU under trauma surgery service with critical care team on consult given his splenic laceration.  Magnesium repletion in process.  Protonix 40 ordered and given.  Receiving dextrose 5%-half saline maintenance fluids.  US carotid artery duplex ordered.  TTE ordered and completed. Currently receiving Unasyn for possible enteritis with colitis. Cultures pending.    Past cardiac history:  -PCP Dr. Ron Cordero, Dr. Randall Cordero   -Follows cardiologist Dr. Jarad Huang, Dr. Beck Hopson  -CV meds: ASA  81, lisinopril 10, pravastatin 20  -Adverse cardiac events: Denies hx CVA and/or MI   -Admission CV labs: Troponin 3, repeat 4.  -Admission ECG: sinus tachycardia with ST depressions in V3 through V6 (anterolateral leads), AVR component mildly elevated however less than 1mm. Repeat ECG NSR.  -TTE 11/13/2023  Left ventricular systolic function is normal.  Spectral Doppler shows an impaired relaxation pattern of left ventricular diastolic filling.  There is low normal right ventricular systolic function.  The left atrium is mild to moderately dilated.  Mildly elevated RVSP.  -TTE from 5/11/2022 unable to be accessed  -Nuclear stress test 12/9/2019:   Normal, average functional capacity for age, no exercise associated cardiac symptoms  PVCs and ventricular couplets at peak exercise  Negative exercise ECG for inducible ischemia moderate WL  -Holter report 11/26/2019:   No episodes of A-fib/PSVT/high-grade AV block, rare PVCs without VT  -TTE 11/6/2019:   EF 55 to 60%, abnormal septal motion, absent A wave on MV spectral Doppler tracing consistent with A-fib  -CABG x4 11/4/2019:   LIMA to the LAD SVG to the diagonal, ramus, PDA and had a short span of A-fib postop without recurrence  -Heart Cath 10/28/2019:   Severe 3 vessel disease - culprit proximal LAD with ulcerated plaque       A 10 point ROS was performed with the patient denying any complaint at this time aside from those listed in the HPI above.      Past Medical History: As above  Past Surgical History: CABG, hernia repair  Family History: CHF, brother with CAD s/p CABG at 55 years old  Social History: denies Hx of smoking tobacco, denies EtOH consumption, denies illicits, endorses 1 cup of decaf coffee once every several weeks,   Allergies/intolerances: Statins (myalgias)  Code Status: Full     Current Outpatient Medications   Medication Instructions    aspirin 81 mg EC tablet 1 tablet, oral, Daily    lisinopril 10 mg, oral, Daily    pravastatin  "(PRAVACHOL) 20 mg, oral, Daily     Currently Scheduled Medications  ampicillin-sulbactam, 1.5 g, intravenous, q6h  atropine, 1 mg, intravenous, Once  magnesium sulfate, 2 g, intravenous, Once  pantoprazole, 40 mg, intravenous, Daily  perflutren lipid microspheres, 0.5-10 mL of dilution, intravenous, Once in imaging    /65   Pulse 73   Temp 36.5 °C (97.7 °F)   Resp 23   Ht 1.778 m (5' 10\")   Wt 86.2 kg (190 lb)   SpO2 96%   BMI 27.26 kg/m²     Physical Exam:   GENERAL: Awake/alert/oriented, cooperative, conversant.  HEAD:  Normocephalic, atraumatic.  EYES:  Round and reactive.  ENT:  No nasal discharge, mucous membranes moist and pink.  NECK:  Atraumatic. No JVD, carotid bruits bilaterally. No cervical lymphadenopathy.   CARDIOVASCULAR:  RRR, no murmur appreciated. S1 and S2 noted.   CHEST: Midline incision noted.  RESPIRATORY:  CTAB, no wheezes, rales, rhonchi.   ABDOMEN:  Soft, nontender, nondistended.   EXTREMITIES:  No peripheral edema, no calf tenderness bilaterally. Pulses palpable in UE and LE bilaterally.   SKIN:  Warm and dry.  NEUROLOGICAL:  Nonfocal grossly. CNII-XII grossly intact. AAOx3     Assessment/Plan:  Prosper Mitchell is a 68-year-old male with a PMHx positive for HTN, HLD, CAD s/p CABG x4 (11/2019), postop A-fib, GERD, recurrent pansinusitis, right-sided sciatica who presented from home to [facility] ED following a syncopal event prior to arrival.     #Vasovagal syncope  Differential: Neurocardiogenic vs orthostatic vs cardiac (arrhythmia, structural) vs neurologic  Patient endorses prodromal symptoms of rush of warmth sensation, diaphoresis, nausea, syncope post defecation (straining)  ECG negative for ischemic processes, troponins negative, TTE negative for wall motion abnormalities, patient responsive to atropine  -Orthostatic vitals ordered  -No adjustments to home cardiovascular meds  -Follow-up with Dr. Huang on OP basis     Kenney Cole DO   Internal Medicine PGY-1    "

## 2023-11-13 NOTE — CONSULTS
Inpatient consult to Cardiology  Consult performed by: Shady Lee MD PhD  Consult ordered by: Natalie Brunner, KALEIGH-CNP  Reason for consult: Syncope        Reason For Consult  Syncope    Subjective   Admission History:  Prosper Mitchell is a 68 y.o. male who presents for Syncope (Pt states he had gone to the bathroom and after that went to go to his room. He states that he felt hot and clammy. Wife states he was grey/white and passed out. Pt lost control of bladder/bowel).      This is a 68-year-old male who initially presented from home to [facility] on 11/13/2023 following a syncopal event prior to arrival.  Patient recalls getting up to go to the bathroom when he suddenly felt diaphoretic, nauseated, and lightheaded prior to passing out.  Wife attempted to help him back into bed, but he had slumped over onto the floor.  There was no witnessed seizure activity.  He did have bowel and bladder incontinence.  Per wife, patient had LOC for a couple minutes at most.  Patient states he had been in his usual state of health up until this event.  However, upon ROS, he does report periumbilical abdominal pain with diarrhea overnight.  He has also had myalgias back pain for the last few months, which he attributes to statin use.  He otherwise denies any chest pain, palpitations, or SOB.    Upon arrival, patient was hemodynamically stable.  Initial labs unremarkable with exception of leukocytosis with WBC 15.5.  CT head and C-spine negative for acute ICH or fracture.  During ED course, patient became acutely bradycardic with HR of 49 and hypotensive with BP 71/45 in the setting of severe chest and back pain.  He subsequently underwent CTA chest/abdomen/pelvis which showed U-shape stenotic configuration with post dilation of celiac artery; grade 2 splenic injury with small amount of perisplenic free fluid; infectious vs inflammatory vs ischemic enteritis; diffuse wall thickening of ascending and transverse colon,  concerning for underdistention vs colitis; small amount of free fluid in abdomen/pelvis; and 2.2 x 2.2 cm enhancing left renal lesion.  CT T-spine negative for fracture.  CT L-spine with question of possible lytic right sacral lesion and lucent although benign-appearing left iliac lesion and sclerotic, benign-appearing L2 lesion.  He did receive atropine 1 mg x 1,  mg, Zofran 4mg, and 1L NS bolus in the ED.  Patient will be admitted to ICU under trauma surgery service with critical care team on consult given his splenic laceration.      Past Medical History:   CAD s/p CABG  C/B postoperative A-fib  Hypertension  Hyperlipidemia  GERD    Past Surgical History: As above  Family History: CHF.  Brother with CAD s/p CABG at age 55.  Allergies: see above  Social history: Denies tobacco, alcohol, or illicit drug use.  Lives at home with wife.    Current Medications: see above    Review of Systems:  12-point ROS was reviewed and is negative, unless otherwise noted in HPI            11/13/2023     8:25 AM 11/13/2023     8:30 AM 11/13/2023     8:35 AM 11/13/2023     9:00 AM 11/13/2023    10:00 AM 11/13/2023    11:00 AM 11/13/2023    12:00 PM   Vitals   Systolic 124 128 104 103 119 95 102   Diastolic 68 73 61 64 65 61 58   Heart Rate 74 75 71 66 73 68 67   Temp       37 °C (98.6 °F)   Resp 15 17 23                Physical Exam:   Constitutional: well developed, awake, alert  ENMT: mucous membranes moist, EOMI, conjunctivae clear  Head/Neck: normocephalic, atraumatic; supple, trachea midline  Respiratory/Thorax: patent airways, CTAB; no wheezes, rales, or rhonchi  Cardiovascular: RRR, no murmur appreciated  Gastrointestinal: soft, nondistended, non-tender, bowel sounds appreciated  Extremities: palpable peripheral pulses, no edema  Neurological: AO x3, no focal deficits  Psychological: appropriate mood and behavior  Skin: warm and dry    Scheduled Medications:   ampicillin-sulbactam, 1.5 g, intravenous, q6h  atropine, 1  mg, intravenous, Once  magnesium sulfate, 2 g, intravenous, Once  pantoprazole, 40 mg, intravenous, Daily  perflutren lipid microspheres, 0.5-10 mL of dilution, intravenous, Once in imaging    Continuous Medications:   dextrose 5%-0.45 % sodium chloride, 75 mL/hr, Last Rate: 75 mL/hr (11/13/23 1106)  DOPamine, 5-10 mcg/kg/min    PRN Medications:     Assessment/Plan   This is a 68-year-old male, with history of CAD s/p CABG, HTN, HLD, and GERD, who initially presented from home to [facility] on 11/13/2023 following a syncopal event prior to arrival.        Syncope, suspect cardiogenic  Symptomatic bradycardia s/p atropine 1 mg IVP in the ED  Statin intolerance with myalgias  CAD s/p CABG  Hypertension  Hyperlipidemia  Enteritis with colitis, ascending and transverse colon  GERD  Left renal lesion, 2.2 x 2.2cm  Splenic laceration, grade II  Enteritis with colitis, ascending and transverse colon    Patient follows up in cardiology clinic with Dr. Huang.  He had coronary artery bypass grafting done in November 2019 with 4 grafts including LIMA to LAD, SVG to ramus, SVG to PDA, SVG to diagonal branch.  Currently his repeat troponins have been negative.  His EKG shows sinus rhythm with poor R progression at the rate of 72 with no significant ST-T changes.  He denies having chest pain.  His last lipid panel in May 2023 showed LDL of 106.  At presentation he had hypotension as well as bradycardia which by now they have been resolved.  His presentation to the hospital this time was with syncope.  He did have atrial fibrillation post bypass surgery but no more recent episodes of atrial fibrillation.  We will continue with current care for his splenic laceration as well as infection by primary team.  We will follow-up with the results of the echocardiogram as well as a carotid duplex.  Depending on his clinical progress, I will consider an event monitor before he gets discharged home to monitor his bradycardia.  We will make  decision about that based on the course of his hospitalization.  We will hold off on aspirin for now considering the splenic laceration.  We will hold any medication for blood pressure at this point.  We will monitor orthostatic hypotension.  We will continue with a statin.  I will follow-up with the patient tomorrow.      Shady Lee MD, PhD, FACC, [facility]  Interventional Cardiology, [facility]  Associate Professor of Medicine, [facility]  Office: [phone]

## 2023-11-13 NOTE — PROGRESS NOTES
This TCC met with patient and family at bedside, introduced self and explained role.  Demographic information and insurance verified.   Patient is from home with spouse.  Independent, without assistive devices PTA.  Denies SW needs at this time.  Patient plans to return home at discharge, no needs.  Transportation home provided by patient's family.  TCC will continue to follow care progression for discharge planning needs.     11/13/23 1049   Discharge Planning   Living Arrangements Spouse/significant other   Support Systems Spouse/significant other;Family members   Assistance Needed None; driving.   Type of Residence Private residence   Number of Stairs to Enter Residence 1   Number of Stairs Within Residence 0   Do you have animals or pets at home? No   Who is requesting discharge planning?   (CCT workflow)   Home or Post Acute Services None   Patient expects to be discharged to: Home   Does the patient need discharge transport arranged? No   Financial Resource Strain   How hard is it for you to pay for the very basics like food, housing, medical care, and heating? Not hard   Housing Stability   In the last 12 months, was there a time when you were not able to pay the mortgage or rent on time? N   In the last 12 months, how many places have you lived? 1   In the last 12 months, was there a time when you did not have a steady place to sleep or slept in a shelter (including now)? N   Transportation Needs   In the past 12 months, has lack of transportation kept you from medical appointments or from getting medications? no   In the past 12 months, has lack of transportation kept you from meetings, work, or from getting things needed for daily living? No     Cheryl Alvarez RN ED TCC

## 2023-11-13 NOTE — CONSULTS
Consults    Reason For Consult  Grade II splenic laceration requiring frequent labs/vitals monitoring    Subjective   Admission History:  Prosper Mitchell is a 68 y.o. male who presents for Syncope (Pt states he had gone to the bathroom and after that went to go to his room. He states that he felt hot and clammy. Wife states he was grey/white and passed out. Pt lost control of bladder/bowel).    HPI  This is a 68-year-old male who initially presented from home to [facility] on 11/13/2023 following a syncopal event prior to arrival.  Patient recalls getting up to go to the bathroom when he suddenly felt diaphoretic, nauseated, and lightheaded prior to passing out.  Wife attempted to help him back into bed, but he had slumped over onto the floor.  There was no witnessed seizure activity.  He did have bowel and bladder incontinence.  Per wife, patient had LOC for a couple minutes at most.  Patient states he had been in his usual state of health up until this event.  However, upon ROS, he does report periumbilical abdominal pain with diarrhea overnight.  He has also had myalgias back pain for the last few months, which he attributes to statin use.  He otherwise denies any chest pain, palpitations, or SOB.    Upon arrival, patient was hemodynamically stable.  Initial labs unremarkable with exception of leukocytosis with WBC 15.5.  CT head and C-spine negative for acute ICH or fracture.  During ED course, patient became acutely bradycardic with HR of 49 and hypotensive with BP 71/45 in the setting of severe chest and back pain.  He subsequently underwent CTA chest/abdomen/pelvis which showed U-shape stenotic configuration with post dilation of celiac artery; grade 2 splenic injury with small amount of perisplenic free fluid; infectious vs inflammatory vs ischemic enteritis; diffuse wall thickening of ascending and transverse colon, concerning for underdistention vs colitis; small amount of free fluid in abdomen/pelvis; and 2.2  x 2.2 cm enhancing left renal lesion.  CT T-spine negative for fracture.  CT L-spine with question of possible lytic right sacral lesion and lucent although benign-appearing left iliac lesion and sclerotic, benign-appearing L2 lesion.  He did receive atropine 1 mg x 1,  mg, Zofran 4mg, and 1L NS bolus in the ED.  Patient will be admitted to ICU under trauma surgery service with critical care team on consult given his splenic laceration.    Past Medical History:   CAD s/p CABG  C/B postoperative A-fib  Hypertension  Hyperlipidemia  GERD    Past Surgical History: As above  Family History: CHF.  Brother with CAD s/p CABG at age 55.  Allergies: see above  Social history: Denies tobacco, alcohol, or illicit drug use.  Lives at home with wife.    Current Medications: see above    Review of Systems:  12-point ROS was reviewed and is negative, unless otherwise noted in HPI    Objective   Vital Signs: Reviewed  Input/Output: Reviewed  Oxygen requirements: Reviewed  Ventilator Information: Reviewed     Physical Exam:   Constitutional: well developed, awake, alert  ENMT: mucous membranes moist, EOMI, conjunctivae clear  Head/Neck: normocephalic, atraumatic; supple, trachea midline  Respiratory/Thorax: patent airways, CTAB; no wheezes, rales, or rhonchi  Cardiovascular: RRR, no murmur appreciated  Gastrointestinal: soft, nondistended, non-tender, bowel sounds appreciated  Extremities: palpable peripheral pulses, no edema  Neurological: AO x3, no focal deficits  Psychological: appropriate mood and behavior  Skin: warm and dry    Scheduled Medications:   atropine, 1 mg, intravenous, Once    Continuous Medications:   DOPamine, 5-10 mcg/kg/min    PRN Medications:     Assessment/Plan   This is a 68-year-old male, with history of CAD s/p CABG, HTN, HLD, and GERD, who initially presented from home to [facility] on 11/13/2023 following a syncopal event prior to arrival.  He had gotten up early that morning to use the bathroom when  he suddenly felt diaphoretic, nauseated, and lightheaded prior to passing out for a couple minutes at most per wife.  Wife attempted to help him back into bed, but he had slumped over onto the floor.  There was no witnessed seizure activity.  He did have bowel and bladder incontinence.      While in the ED, he became acutely bradycardic with HR of 49 and hypotensive with BP 71/45 in the setting of severe chest and back pain.  He did receive Atropine 1mg IVP.  Initial work-up notable for grade 2 splenic injury with small mount of perisplenic free fluid as well as enteritis and colitis.  He was also found to have 2.2 x 2.2 cm enhancing left renal lesion as well as lucent lesions involving right sacrum and left iliac bone.  There is also sclerotic benign-appearing L2 lesion.  Patient was admitted to ICU under trauma surgery service with critical care team on consult given his splenic laceration.    Neurological:  Right sacral lesion, lucent with question of lytic lesion seen on CT  Lucent, benign-appearing left iliac bone lesion  Sclerotic, benign-appearing L2 lesion  Currently at baseline AOx3.  - Analgesia: PRN Tylenol, Dilaudid per surgery  - Avoid excess caths & drains  - Monitor for ICU delirium    Cardiovascular:  Syncope, suspect cardiogenic  Symptomatic bradycardia s/p atropine 1 mg IVP in the ED  Statin intolerance with myalgias  CAD s/p CABG  Hypertension  Hyperlipidemia  - Cardiology consulted. Continue with telemetry monitoring. TTE, carotid US pending.  - Hold home Lisinopril, Pravastatin. Maintain MAP>65    Respiratory:  Stable on RA.  No acute issues.    Gastrointestinal:  Enteritis with colitis, ascending and transverse colon  GERD  - Diet: NPO  - PPX: Protonix    Endocrine:  No hx DM.  - Accuchecks q4h while NPO.    Renal:  Left renal lesion, 2.2 x 2.2cm  Baseline SCr ~0.7-0.9  Lactate (-)  - Monitor & replete electrolytes PRN  - Urology consulted for left renal lesion. Will need further workup,  including PET/CT, as outpatient.    Hematological:  Splenic laceration, grade II  - Trend CBC. Keep active T&S. Transfuse PRN for Hgb<7 or HDI.    Infection Disease:  Enteritis with colitis, ascending and transverse colon  UA (-) for infection.  - Will treat empirically with Unasyn (11/13 - )  - Follow BCx    Skin/MSK:  No acute issues.    Vent/O2: RA  Lines/Devices: PIVs  Drips: (Dopamine on standby)  ATBx: Unasyn (11/13 - )  Diet: NPO  IVF: D5-1/2 NS @ 75cc/hr  PPX: SCDs only, Protonix  Code: FULL  Dispo: ICU    This is a preliminary note written by the resident. Please wait for attending addendum for finalization of note and recommendations.    Marie Nelson, DO  Internal Medicine PGY3

## 2023-11-13 NOTE — CONSULTS
Reason For Consult  Left renal mass and possible mets to bone    History Of Present Illness  Prosper Mitchell is a 68 y.o. male presenting with splenic laceration who has a past medical history significant for HTN, HLD, palpitations, and CAD s/p CABG (November 2019). Patient has been struggling with myalgias and left shoulder/upper back pain for some months. He attributes this to statin use and has been seeing PCP / trialing different medications. Last took a statin on Wednesday (11/8). Only took Motrin/Tylenol sporadically, but not consistently for occasional pains.   Patient's wife recalls that her  walked loudly into the bedroom door waking her up. She asked him what was wrong, but he provided no verbal answer. She tried to help him into bed, but patient ultimately fell/slumped to the floor. Wife states that he did not hit anything during fall. He did have bowel/bladder incontinence. No seizure activity witnessed. Wife called EMS and patient was brought to [facility] ED. No report of loss of pulse or apnea.      While in ED: patient received PRN Zofran for nausea and 1L NS bolus. Due to fall, CT Head and Cspine performed; both negative for injury/bleed. In ED patient developed symptomatic bradycardia to 40s requiring 1mg IV Atropine. Patient states he felt similar to when he passed out early this morning; lightheaded and dizzy along with clammy/hot. 12 lead EKG did not demonstrate any acute ST elevation, but did have ST depressions and patient received 325mg Aspirin. CTA C/A/P ordered to rule out dissection with new chest pain associated with bradycardia. This scan was negative for any aortic pathology, but did demonstrate Grade II Liver laceration with free fluid in abdomen. Incidental finding of 2x2cm enhancing lesion in left kidney.    .     Past Medical History  He has a past medical history of Abnormal findings on diagnostic imaging of other specified body structures, CAD (coronary artery disease), HLD  "(hyperlipidemia), HTN (hypertension), Other conditions influencing health status, Personal history of other diseases of the circulatory system, Personal history of other medical treatment, Personal history of other medical treatment, Personal history of other medical treatment, and Personal history of other medical treatment.    Surgical History  He has a past surgical history that includes Hernia repair (Left, 2004) and Coronary artery bypass graft (11/2019).     Social History  He reports that he has never smoked. He has never used smokeless tobacco. He reports that he does not drink alcohol and does not use drugs.    Family History  Family History   Problem Relation Name Age of Onset    Coronary artery disease Brother          Allergies  Statins-hmg-coa reductase inhibitors and Dexamethasone    Review of Systems  Reviewed H&P ROS    Physical Exam  Alert and oriented x3  Non-labored breathing         Last Recorded Vitals  Blood pressure 104/61, pulse 71, temperature 36.5 °C (97.7 °F), resp. rate 23, height 1.778 m (5' 10\"), weight 86.2 kg (190 lb), SpO2 95 %.    Relevant Results      CT lumbar spine wo IV contrast    Result Date: 11/13/2023  Interpreted By:  Bob Wahl, STUDY: CT LUMBAR SPINE WO IV CONTRAST;  11/13/2023 7:07 am   INDICATION: Signs/Symptoms:truma.   COMPARISON: CTA chest, abdomen and pelvis earlier same day 13 November 2023   ACCESSION NUMBER(S): [id number]   ORDERING CLINICIAN: SANJUANITA OZUNA   TECHNIQUE: Helical CT of the lumbar spine from the thoracolumbar junction through the upper sacrum with sagittal and coronal reformatted images. No intravenous contrast   FINDINGS: COUNTING REFERENCE:  Lumbosacral junction. There are 5 lumbar type vertebral bodies; the L4-5 level is at the iliac crests and lowest lumbar type vertebral body is defined as L5   ALIGNMENT Subluxation / other acute traumatic alignment derangement:  No traumatic subluxation. Grade 1 anterolisthesis of L5 on S1 with associated " chronic bilateral L5 pars defects Other alignment abnormality:  Negative   ACUTE FRACTURE:  Negative. No acute fracture of the lumbar spine or any other included osseous structure   PARS DEFECT/S:  Chronic bilateral   AGGRESSIVE OSSEOUS LESION:  Lucent if not lytic right sacral lesion, coronal 85 for example. Another lucent but more benign-appearing left iliac bone lesion, axial series 205, image 107. Sclerotic L2 lesion on the right is benign-appearing   DISC SPACES:  Maintained throughout.  No significant disc volume loss in the lumbar spine   BONY CANAL AND FORAMINA:  No significant bony canal or bony foraminal stenosis   SACROILIAC JOINTS:  Unremarkable   INCIDENTALLY INCLUDED SOFT TISSUES:  CTA chest, abdomen and pelvis from earlier same day already reported separately described a renal mass. Refer to separate report   OTHER:  n/a       NO ACUTE FRACTURE OR SUBLUXATION IN THE LUMBAR SPINE   LUCENT IF NOT LYTIC RIGHT SACRAL LESION IN THE CONTEXT OF THE ENHANCING LEFT RENAL MASS IS A POTENTIAL METASTATIC LESION. ANOTHER LUCENT BUT MORE BENIGN-APPEARING LESION IS IN THE LEFT ILIAC BONE AND THERE IS ALSO A SCLEROTIC, BENIGN-APPEARING L2 LESION, NONE OF WHICH HAVE PERTINENT COMPARISON EXAMS OF THE LOCAL PACS ARCHIVE   MACRO: None   Signed by: Bob Wahl 11/13/2023 7:36 AM Dictation workstation:   [id number]    CT thoracic spine wo IV contrast    Result Date: 11/13/2023  Interpreted By:  Bob Wahl, STUDY: CT THORACIC SPINE WO IV CONTRAST;  11/13/2023 7:07 am   INDICATION: Signs/Symptoms:trauma.   COMPARISON: CTA chest, abdomen and pelvis earlier same day 13 November 2023   ACCESSION NUMBER(S): [id number]   ORDERING CLINICIAN: SANJUANITA OZUNA   TECHNIQUE: CT thoracic spine without intravenous contrast, including sagittal and coronal reformatted images   FINDINGS: COUNTING REFERENCE:  Cervico-Thoracic junction.  The uppermost rib-bearing vertebral body is defined as T1 and the lowest rib-bearing vertebral body is  defined as T12.  This patient has twelve normal rib bearing vertebral bodies.   ALIGNMENT Subluxation / other acute traumatic alignment derangement:  Negative Other alignment abnormality:  Negative   ACUTE FRACTURE:  Negative. No acute fracture of the thoracic spine or any other included osseous structure   AGGRESSIVE OSSEOUS LESION:  Negative   DISC SPACES:  Maintained throughout.  No significant disc volume loss in the thoracic spine   BONY CANAL AND FORAMINA:  No significant bony canal or bony foraminal stenosis   INCIDENTALLY INCLUDED SOFT TISSUES:  Refer to more sensitive and specific CTA chest, abdomen or pelvis from earlier same day 13 November 2023, already reported separately   OTHER:  n/a       NO ACUTE FRACTURE OR SUBLUXATION IN THE THORACIC SPINE   MACRO: None   Signed by: Bob Wahl 11/13/2023 7:36 AM Dictation workstation:   [id number]    XR chest 1 view    Result Date: 11/13/2023  Interpreted By:  Graciela Thornton, STUDY: XR CHEST 1 VIEW;  11/13/2023 6:00 am   INDICATION: Signs/Symptoms: Syncope   COMPARISON: Chest x-ray 12/12/2019.   ACCESSION NUMBER(S): [id number]   ORDERING CLINICIAN: SANJUANITA OZUNA   TECHNIQUE: Portable upright frontal view of the chest was obtained .   FINDINGS: The cardiomediastinal silhouette is within normal limits. The patient is status post open heart surgery.   No focal consolidation, pleural effusion or pneumothorax.         1.  No radiographic evidence of acute cardiopulmonary process.       MACRO: None.   Signed by: Graciela Thornton 11/13/2023 6:31 AM Dictation workstation:   [id number]    CT angio chest abdomen pelvis    Result Date: 11/13/2023  Interpreted By:  Graciela Thornton, STUDY: CT ANGIO CHEST ABDOMEN PELVIS;  11/13/2023 5:35 am   INDICATION: Signs/Symptoms:Chest pain back pain concern for aortic dissection   COMPARISON: CT cardiac scoring 08/29/2019.   ACCESSION NUMBER(S): [id number]   ORDERING CLINICIAN: SANJUANITA OZUNA   TECHNIQUE: Noncontrast axial CT images  of the chest, abdomen and pelvis were obtained prior to intravenous contrast administration. Axial CT images of the chest, abdomen and pelvis was obtained after uneventful intravenous administration of 140 mLOmnipaque 350 contrast material using CT angiographic technique. Coronal and sagittal images are reconstructed.  3-D and MIP reconstructions were performed on an independent workstation and provided for review.   FINDINGS: NON-CONTRAST IMAGING:   No thoracic aortic aneurysm. No hyperdense intramural thrombus. No abdominal aortic aneurysm. Mild atherosclerotic calcifications at the abdominal and its branches No obstructing ureteral calculi.     POST-CONTRAST IMAGING:   VASCULAR: AORTA: No thoracic aortic aneurysm or acute dissection. No abdominal aortic aneurysm or acute dissection. The celiac artery, the superior mesenteric artery, the bilateral renal arteries and inferior mesenteric artery are patent. There is a U-shaped stenotic configuration with poststenotic dilation of the celiac artery. The bilateral common iliac arteries, the bilateral internal iliac arteries, the bilateral external iliac arteries, the bilateral common femoral arteries and the visualized bilateral proximal femoral arteries are patent.     CHEST:   HEART: Normal size. No pericardial effusion. The patient is status post open heart surgery. MEDIASTINUM AND CHARLES: No pathologically enlarged thoracic lymph nodes.  Fluid noted at the esophagus. LUNG, PLEURA, LARGE AIRWAYS: The trachea and the mainstem bronchi are patent.No consolidation, pleural effusion or pneumothorax. CHEST WALL AND LOWER NECK: There is bilateral gynecomastia.   ABDOMEN:   BONES: Multilevel degenerative changes at the spine. There is bilateral pars defect at L5. There is mild retrolisthesis of L4 on L5 and there is mild anterolisthesis of L5 on S1. ABDOMINAL WALL: Postsurgical changes are noted at the left inguinal region. There is a small fat containing right inguinal  hernia..   LIVER: Unremarkable. No portal venous gas. BILE DUCTS: No significant dilation. GALLBLADDER: Present. PANCREAS: No peripancreatic soft tissue stranding. SPLEEN: There is a 2.0 cm linear lucency at the anterior aspect of the spleen, concerning for splenic laceration, grade 2 injury. There is a 2.4 cm linear lucency at the posterior aspect of the spleen. Additional linear lucency measuring 1.4 cm in length is noted at the superior aspect of the spleen. There is a small amount of perisplenic free fluid. ADRENALS:  Unremarkable. KIDNEYS: Symmetric renal enhancement.  No hydronephrosis. There is a 2.2 x 2.2 cm enhancing lesion at the superior pole of the left kidney. URETERS: No hydroureter.   PELVIS:   REPRODUCTIVE ORGANS: The prostate measures 4.6 cm in transverse diameter. BLADDER: Partially distended.   RETROPERITONEUM: No retroperitoneal hemorrhage. BOWEL: The stomach is distended with enteric contents. No evidence for bowel obstruction. Air-fluid levels are noted at small bowel loops. There is wall thickening at small bowel loops at the mid abdomen with adjacent mesenteric edema. The ascending colon and the transverse colon are decompressed with diffuse wall thickening. Moderate amount of stool at the descending colon and sigmoid colon. No pneumatosis. Appendicoliths are noted at the appendix. The appendix is not dilated. PERITONEUM: Small amount of free fluid in the abdomen and pelvis. No free air. No pathologically enlarged lymph nodes are identified.       1. No aortic aneurysm or acute dissection. 2. U-shaped stenotic configuration with poststenotic dilation of the celiac artery. Please correlate with clinical concern for median arcuate ligament syndrome. 3. 2.0 cm linear lucency at the anterior aspect of the spleen, concerning for splenic laceration, grade 2 splenic injury. Additional linear lucency measuring 2.4 cm at the posterior aspect of the spleen and linear lucency measuring 1.4 cm in length  at the superior aspect of the spleen, may represent splenic clefts versus splenic lacerations. Small amount of perisplenic free fluid. 4. Air-fluid levels at small bowel loops. Wall thickening at small bowel loops at the mid abdomen with adjacent mesenteric edema, suggestive of enteritis. This may be of acute infectious, inflammatory or ischemic etiology. Please correlate with lactate level. Mesenteric/bowel injury cannot be excluded. Clinical correlation recommended. 5. Diffuse wall thickening at the ascending colon and transverse colon, may be secondary to underdistention versus colitis. 6. Small amount of free fluid in the abdomen and pelvis. 7. 2.2 x 2.2 cm enhancing lesion at the left kidney. Follow-up urology consult recommended. 8. Fluid at the esophagus, suggestive of gastroesophageal reflux.       MACRO: Graciela Thornton discussed the significance and urgency of this critical finding by telephone with  SANJUANITA OZUNA on 11/13/2023 at 6:21 am.  (**-RCF-**) Findings:  See findings.     Critical Finding:  New mass in the kidney. Notification was initiated on 11/13/2023 at 6:05 am by  Graciela Thornton.  (**-YCF-**) Instructions:  Urology consult.   Signed by: Graciela Thornton 11/13/2023 6:30 AM Dictation workstation:   [id number]    CT head wo IV contrast    Result Date: 11/13/2023  Interpreted By:  Graciela Thornton, STUDY: CT HEAD WO IV CONTRAST; CT CERVICAL SPINE WO IV CONTRAST;  11/13/2023 5:03 am; 11/13/2023 5:04 am   INDICATION: Signs/Symptoms:syncope; Signs/Symptoms:syncope head trauma.   COMPARISON: None   ACCESSION NUMBER(S): [id number]; [id number]   ORDERING CLINICIAN: SANJUANITA OZUNA   TECHNIQUE: Axial noncontrast images of the head  with coronal  and sagittal reconstructed images . Axial noncontrast images of the cervical spine with coronal and sagittal reconstructed images.   FINDINGS: BRAIN PARENCHYMA: The gray-white matter interfaces are preserved. No acute territorial infarct, mass effect or midline  shift. HEMORRHAGE: No acute intracranial hemorrhage. VENTRICLES and EXTRA-AXIAL SPACES: Normal size. EXTRACRANIAL SOFT TISSUES: Within normal limits. CALVARIUM: No depressed calvarial fracture. PARANASAL SINUSES: No air-fluid levels. MASTOIDS: Within normal limits.   CERVICAL SPINE: ALIGNMENT: There is mild anterolisthesis of C4 on C5, probably on a degenerative basis. VERTEBRAE: No acute fracture. DISCS: There is degenerative disc disease with osteophytosis at C5-C6 and C6-C7. Multilevel facet arthropathy. PREVERTEBRAL SOFT TISSUES: No prevertebral soft tissue swelling. LUNG APICES: No acute findings.         1. No acute intracranial hemorrhage or depressed calvarial fracture. 2. No acute fracture at the cervical spine. Degenerative changes.       MACRO: None.   Signed by: Graciela Thornton 11/13/2023 5:38 AM Dictation workstation:   [id number]    CT cervical spine wo IV contrast    Result Date: 11/13/2023  Interpreted By:  Graciela Thornton, STUDY: CT HEAD WO IV CONTRAST; CT CERVICAL SPINE WO IV CONTRAST;  11/13/2023 5:03 am; 11/13/2023 5:04 am   INDICATION: Signs/Symptoms:syncope; Signs/Symptoms:syncope head trauma.   COMPARISON: None   ACCESSION NUMBER(S): [id number]; [id number]   ORDERING CLINICIAN: SANJUANITA OZUNA   TECHNIQUE: Axial noncontrast images of the head  with coronal  and sagittal reconstructed images . Axial noncontrast images of the cervical spine with coronal and sagittal reconstructed images.   FINDINGS: BRAIN PARENCHYMA: The gray-white matter interfaces are preserved. No acute territorial infarct, mass effect or midline shift. HEMORRHAGE: No acute intracranial hemorrhage. VENTRICLES and EXTRA-AXIAL SPACES: Normal size. EXTRACRANIAL SOFT TISSUES: Within normal limits. CALVARIUM: No depressed calvarial fracture. PARANASAL SINUSES: No air-fluid levels. MASTOIDS: Within normal limits.   CERVICAL SPINE: ALIGNMENT: There is mild anterolisthesis of C4 on C5, probably on a degenerative basis. VERTEBRAE:  No acute fracture. DISCS: There is degenerative disc disease with osteophytosis at C5-C6 and C6-C7. Multilevel facet arthropathy. PREVERTEBRAL SOFT TISSUES: No prevertebral soft tissue swelling. LUNG APICES: No acute findings.         1. No acute intracranial hemorrhage or depressed calvarial fracture. 2. No acute fracture at the cervical spine. Degenerative changes.       MACRO: None.   Signed by: Graciela Thornton 11/13/2023 5:38 AM Dictation workstation:   [id number]    Results for orders placed or performed during the hospital encounter of 11/13/23 (from the past 24 hour(s))   CBC and Auto Differential   Result Value Ref Range    WBC 15.5 (H) 4.4 - 11.3 x10*3/uL    nRBC 0.0 0.0 - 0.0 /100 WBCs    RBC 5.14 4.50 - 5.90 x10*6/uL    Hemoglobin 16.4 13.5 - 17.5 g/dL    Hematocrit 48.2 41.0 - 52.0 %    MCV 94 80 - 100 fL    MCH 31.9 26.0 - 34.0 pg    MCHC 34.0 32.0 - 36.0 g/dL    RDW 12.3 11.5 - 14.5 %    Platelets 177 150 - 450 x10*3/uL    Neutrophils % 79.6 40.0 - 80.0 %    Immature Granulocytes %, Automated 0.6 0.0 - 0.9 %    Lymphocytes % 10.1 13.0 - 44.0 %    Monocytes % 9.0 2.0 - 10.0 %    Eosinophils % 0.4 0.0 - 6.0 %    Basophils % 0.3 0.0 - 2.0 %    Neutrophils Absolute 12.36 (H) 1.20 - 7.70 x10*3/uL    Immature Granulocytes Absolute, Automated 0.10 0.00 - 0.70 x10*3/uL    Lymphocytes Absolute 1.56 1.20 - 4.80 x10*3/uL    Monocytes Absolute 1.39 (H) 0.10 - 1.00 x10*3/uL    Eosinophils Absolute 0.06 0.00 - 0.70 x10*3/uL    Basophils Absolute 0.05 0.00 - 0.10 x10*3/uL   Comprehensive metabolic panel   Result Value Ref Range    Glucose 108 (H) 74 - 99 mg/dL    Sodium 142 136 - 145 mmol/L    Potassium 4.6 3.5 - 5.3 mmol/L    Chloride 104 98 - 107 mmol/L    Bicarbonate 30 21 - 32 mmol/L    Anion Gap 13 10 - 20 mmol/L    Urea Nitrogen 15 6 - 23 mg/dL    Creatinine 0.93 0.50 - 1.30 mg/dL    eGFR 90 >60 mL/min/1.73m*2    Calcium 9.3 8.6 - 10.3 mg/dL    Albumin 4.3 3.4 - 5.0 g/dL    Alkaline Phosphatase 54 33 - 136  U/L    Total Protein 5.8 (L) 6.4 - 8.2 g/dL    AST 15 9 - 39 U/L    Bilirubin, Total 0.6 0.0 - 1.2 mg/dL    ALT 17 10 - 52 U/L   Magnesium   Result Value Ref Range    Magnesium 1.83 1.60 - 2.40 mg/dL   Influenza A, and B PCR   Result Value Ref Range    Flu A Result Not Detected Not Detected    Flu B Result Not Detected Not Detected   SARS-CoV-2 RT PCR   Result Value Ref Range    Coronavirus 2019, PCR Not Detected Not Detected   Troponin I, High Sensitivity, Initial   Result Value Ref Range    Troponin I, High Sensitivity 3 0 - 20 ng/L   Troponin, High Sensitivity, 1 Hour   Result Value Ref Range    Troponin I, High Sensitivity 4 0 - 20 ng/L   Type and Screen   Result Value Ref Range    ABO TYPE AB     Rh TYPE POS     ANTIBODY SCREEN NEG    Hemoglobin and hematocrit, blood   Result Value Ref Range    Hemoglobin 15.5 13.5 - 17.5 g/dL    Hematocrit 45.1 41.0 - 52.0 %   Urinalysis with Reflex Microscopic   Result Value Ref Range    Color, Urine Yellow Straw, Yellow    Appearance, Urine Clear Clear    Specific Gravity, Urine <1.005 (A) 1.005 - 1.035    pH, Urine 8.5 (N) 5.0, 5.5, 6.0, 6.5, 7.0, 7.5, 8.0    Protein, Urine 30 (1+) (A) NEGATIVE, 10 (TRACE) mg/dL    Glucose, Urine NEGATIVE NEGATIVE mg/dL    Blood, Urine NEGATIVE NEGATIVE    Ketones, Urine NEGATIVE NEGATIVE mg/dL    Bilirubin, Urine NEGATIVE NEGATIVE    Urobilinogen, Urine NORM NORM mg/dL    Nitrite, Urine NEGATIVE NEGATIVE    Leukocyte Esterase, Urine NEGATIVE NEGATIVE   Microscopic Only, Urine   Result Value Ref Range    WBC, Urine NONE 1-5, NONE /HPF    RBC, Urine 1-2 NONE, 1-2, 3-5 /HPF    atropine, 1 mg, intravenous, Once  pantoprazole, 40 mg, intravenous, Daily  perflutren lipid microspheres, 0.5-10 mL of dilution, intravenous, Once in imaging          Assessment/Plan     68 yr old male with a left renal mass with lesion in sacrum.   -He will need PET CT as outpatient  -His main concern at this time is the splenic laceration.  -Recommend Oncology  Consult'  Will follow along with you       Thank you for allowing us to participate in his care    I spent 30 minutes in the professional and overall care of this patient.      Miguel Queen PA-C

## 2023-11-13 NOTE — H&P
History Of Present Illness  Prosper Mitchell is a 68 y.o. male presenting with syncope found to have Grade II Splenic laceration. Admitted to Trauma service with ICU and cardiology consult.    Primary Survey:  A: Airway intact  B: Equal bilateral breath sounds, on room air  C: 2+ pulses throughout, warm and well perfused.   D: GCS 15, no deficits.  E: Fully uncovered and examined; covered with a warm blanket.    Patient has a past medical history significant for HTN, HLD, palpitations, and CAD s/p CABG (November 2019). Patient has been struggling with myalgias and left shoulder/upper back pain for some months. He attributes this to statin use and has been seeing PCP / trialing different medications. Last took a statin on Wednesday (11/8). Only took Motrin/Tylenol sporadically, but not consistently for occasional pains.    Patient developed abdominal pain last evening after dinner. He only ate soup for dinner and has not had any sick contacts/new food exposures. Unable to fully describe the characteristics of pain, only that it made it difficult for him to sleep last night. He point to umbilicus and just below when recalling this pain.   He got out of bed just before 4am to use the bathroom. Recalls having a normal BM (did not see color), but does not believe it was loose or diarrhea. When walking back to bedroom, patient began to feel very unwell: clammy, hot, and lightheaded.    Patient's wife recalls that her  walked loudly into the bedroom door waking her up. She asked him what was wrong, but he provided no verbal answer. She tried to help him into bed, but patient ultimately fell/slumped to the floor. Wife states that he did not hit anything during fall. He did have bowel/bladder incontinence. No seizure activity witnessed. Wife called EMS and patient was brought to [facility] ED. No report of loss of pulse or apnea.     At present, patient denies any focal complaints. He recalls feeling like he was going  to pass out after walking back to bed early this morning. Denies lightheadedness or dizziness at present.    While in ED: patient received PRN Zofran for nausea and 1L NS bolus. Due to fall, CT Head and Cspine performed; both negative for injury/bleed. In ED patient developed symptomatic bradycardia to 40s requiring 1mg IV Atropine. Patient states he felt similar to when he passed out early this morning; lightheaded and dizzy along with clammy/hot. 12 lead EKG did not demonstrate any acute ST elevation, but did have ST depressions and patient received 325mg Aspirin. CTA C/A/P ordered to rule out dissection with new chest pain associated with bradycardia. This scan was negative for any aortic pathology, but did demonstrate Grade II Liver laceration with free fluid in abdomen. Incidental finding of 2x2cm enhancing lesion in left kidney.     Secondary Survey:    PCP: Dr. Ron Cordero   Cardiologist: Dr. Huang      Past Medical History  Past Medical History:   Diagnosis Date    Abnormal findings on diagnostic imaging of other specified body structures     Abnormal CT of the chest    CAD (coronary artery disease)     HLD (hyperlipidemia)     HTN (hypertension)     Other conditions influencing health status     Normal cardiac stress test    Personal history of other diseases of the circulatory system     History of abnormal electrocardiography    Personal history of other medical treatment     History of cardiac monitoring    Personal history of other medical treatment     History of echocardiogram    Personal history of other medical treatment     History of nuclear stress test    Personal history of other medical treatment     H/O Doppler ultrasound       Surgical History  Past Surgical History:   Procedure Laterality Date    CORONARY ARTERY BYPASS GRAFT  11/2019    Dr. Briggs; CABG x4    HERNIA REPAIR Left 2004    Inguinal Hernia repair   LHC with culprit proximal LAD (10/2019)      Social History  He reports  that he has never smoked. He has never used smokeless tobacco. He reports that he does not drink alcohol and does not use drugs.  Lives in [place] with his wife.  Exercises; bikes in summer, tries walking multiple times per week  Retired supervisor     Family History  Family History   Problem Relation Name Age of Onset    Coronary artery disease Brother          Allergies  Statins-hmg-coa reductase inhibitors and Dexamethasone  Statin- Myopathies     Review of Systems   A full 10 point review of systems was performed and found to be negative except those mentioned in HPI    Physical Exam    Constitutional: Patient is very pleasantly resting on cart in ED with wife and sister-in-law present. No acute distress.  NEURO: A&O x3, GCS 15, CN II-XII intact, SEVILLA equally, muscle strength 5/5, no sensory deficits  HEAD: NC/AT, No lacerations or abrasions, no bony step offs, midface stable.  EENT: PERRL, EOMI. Pupils 3mm b/l. external ear without laceration. Nasal septum midline, no crepitus or septal hematoma. Oral mucosa and tongue without lacerations, teeth in place, good repair   NECK: No cervical spine tenderness or step offs, no lacerations or abrasions, trachea midline. No JVD.  RESPIRATORY/CHEST: No abrasions, contusions, crepitus or tenderness to palpation. Non-labored, equal chest expansion, CTAB, no adventitious lung sounds, no cough  CV: ST on telemetry at 100, nml S1 and S2, no M/R/G. Sternotomy scar noted. Pulses bilateral: 2+ radial, 2+DP, 2+PT,  2+femoral and 2+ carotid. No TTP of chest  ABDOMEN: soft, tender to palpate LLQ/mid left side, nondistended. No scars, abrasions or lacerations. Active bowel sounds. No rebound tenderness. No guarding.  PELVIS: Stable to compression.  : nml external genitalia, no blood at urethral meatus  RECTAL: rectal tone intact    BACK/SPINE: No thoracic midline tenderness, step-offs or deformities. No lumbar midline tenderness, step-offs, or deformities.  Small scab right  "upper scapula - no bleeding  EXTREMITIES: No edema or cyanosis. Painful ROM Left shoulder; chronic per patient. No deformities, lacerations or contusions.      Last Recorded Vitals  Blood pressure 125/67, pulse 85, temperature 36.5 °C (97.7 °F), resp. rate (!) 27, height 1.778 m (5' 10\"), weight 86.2 kg (190 lb), SpO2 96 %.    Relevant Results  Results for orders placed or performed during the hospital encounter of 11/13/23 (from the past 24 hour(s))   CBC and Auto Differential   Result Value Ref Range    WBC 15.5 (H) 4.4 - 11.3 x10*3/uL    nRBC 0.0 0.0 - 0.0 /100 WBCs    RBC 5.14 4.50 - 5.90 x10*6/uL    Hemoglobin 16.4 13.5 - 17.5 g/dL    Hematocrit 48.2 41.0 - 52.0 %    MCV 94 80 - 100 fL    MCH 31.9 26.0 - 34.0 pg    MCHC 34.0 32.0 - 36.0 g/dL    RDW 12.3 11.5 - 14.5 %    Platelets 177 150 - 450 x10*3/uL    Neutrophils % 79.6 40.0 - 80.0 %    Immature Granulocytes %, Automated 0.6 0.0 - 0.9 %    Lymphocytes % 10.1 13.0 - 44.0 %    Monocytes % 9.0 2.0 - 10.0 %    Eosinophils % 0.4 0.0 - 6.0 %    Basophils % 0.3 0.0 - 2.0 %    Neutrophils Absolute 12.36 (H) 1.20 - 7.70 x10*3/uL    Immature Granulocytes Absolute, Automated 0.10 0.00 - 0.70 x10*3/uL    Lymphocytes Absolute 1.56 1.20 - 4.80 x10*3/uL    Monocytes Absolute 1.39 (H) 0.10 - 1.00 x10*3/uL    Eosinophils Absolute 0.06 0.00 - 0.70 x10*3/uL    Basophils Absolute 0.05 0.00 - 0.10 x10*3/uL   Comprehensive metabolic panel   Result Value Ref Range    Glucose 108 (H) 74 - 99 mg/dL    Sodium 142 136 - 145 mmol/L    Potassium 4.6 3.5 - 5.3 mmol/L    Chloride 104 98 - 107 mmol/L    Bicarbonate 30 21 - 32 mmol/L    Anion Gap 13 10 - 20 mmol/L    Urea Nitrogen 15 6 - 23 mg/dL    Creatinine 0.93 0.50 - 1.30 mg/dL    eGFR 90 >60 mL/min/1.73m*2    Calcium 9.3 8.6 - 10.3 mg/dL    Albumin 4.3 3.4 - 5.0 g/dL    Alkaline Phosphatase 54 33 - 136 U/L    Total Protein 5.8 (L) 6.4 - 8.2 g/dL    AST 15 9 - 39 U/L    Bilirubin, Total 0.6 0.0 - 1.2 mg/dL    ALT 17 10 - 52 U/L "   Magnesium   Result Value Ref Range    Magnesium 1.83 1.60 - 2.40 mg/dL   Influenza A, and B PCR   Result Value Ref Range    Flu A Result Not Detected Not Detected    Flu B Result Not Detected Not Detected   SARS-CoV-2 RT PCR   Result Value Ref Range    Coronavirus 2019, PCR Not Detected Not Detected   Troponin I, High Sensitivity, Initial   Result Value Ref Range    Troponin I, High Sensitivity 3 0 - 20 ng/L   Troponin, High Sensitivity, 1 Hour   Result Value Ref Range    Troponin I, High Sensitivity 4 0 - 20 ng/L   Hemoglobin and hematocrit, blood   Result Value Ref Range    Hemoglobin 15.5 13.5 - 17.5 g/dL    Hematocrit 45.1 41.0 - 52.0 %       CT lumbar spine wo IV contrast  Result Date: 11/13/2023    NO ACUTE FRACTURE OR SUBLUXATION IN THE LUMBAR SPINE   LUCENT IF NOT LYTIC RIGHT SACRAL LESION IN THE CONTEXT OF THE ENHANCING LEFT RENAL MASS IS A POTENTIAL METASTATIC LESION. ANOTHER LUCENT BUT MORE BENIGN-APPEARING LESION IS IN THE LEFT ILIAC BONE AND THERE IS ALSO A SCLEROTIC, BENIGN-APPEARING L2 LESION, NONE OF WHICH HAVE PERTINENT COMPARISON EXAMS OF THE LOCAL PACS ARCHIVE   MACRO: None   Signed by: Bob Wahl 11/13/2023 7:36 AM Dictation workstation:   [id number]    CT thoracic spine wo IV contrast  Result Date: 11/13/2023    NO ACUTE FRACTURE OR SUBLUXATION IN THE THORACIC SPINE   MACRO: None   Signed by: Bob Wahl 11/13/2023 7:36 AM Dictation workstation:   [id number]    CT angio chest abdomen pelvis  Result Date: 11/13/2023    1. No aortic aneurysm or acute dissection.   2. U-shaped stenotic configuration with poststenotic dilation of the celiac artery. Please correlate with clinical concern for median arcuate ligament syndrome.   3. 2.0 cm linear lucency at the anterior aspect of the spleen, concerning for splenic laceration, grade 2 splenic injury. Additional linear lucency measuring 2.4 cm at the posterior aspect of the spleen and linear lucency measuring 1.4 cm in length at the superior aspect  of the spleen, may represent splenic clefts versus splenic lacerations. Small amount of perisplenic free fluid.   4. Air-fluid levels at small bowel loops. Wall thickening at small bowel loops at the mid abdomen with adjacent mesenteric edema, suggestive of enteritis. This may be of acute infectious, inflammatory or ischemic etiology. Please correlate with lactate level. Mesenteric/bowel injury cannot be excluded. Clinical correlation recommended.   5. Diffuse wall thickening at the ascending colon and transverse colon, may be secondary to underdistention versus colitis.   6. Small amount of free fluid in the abdomen and pelvis.   7. 2.2 x 2.2 cm enhancing lesion at the left kidney. Follow-up urology consult recommended.   8. Fluid at the esophagus, suggestive of gastroesophageal reflux.       MACRO: Graciela Thornton discussed the significance and urgency of this critical finding by telephone with  SANJUANITA OZUNA on 11/13/2023 at 6:21 am.  (**-RCF-**) Findings:  See findings.     Critical Finding:  New mass in the kidney. Notification was initiated on 11/13/2023 at 6:05 am by  Graciela Thornton.  (**-YCF-**) Instructions:  Urology consult.   Signed by: Graciela Thornton 11/13/2023 6:30 AM Dictation workstation:   [id number]    CT head wo IV contrast  CT cervical spine wo IV contrast  Result Date: 11/13/2023    1. No acute intracranial hemorrhage or depressed calvarial fracture. 2. No acute fracture at the cervical spine. Degenerative changes.       MACRO: None.   Signed by: Graciela Thornton 11/13/2023 5:38 AM Dictation workstation:   [id number]         Assessment/Plan   Principal Problem:    Splenic laceration, initial encounter      Prosper Mitchell is a 68 y.o. male with a past medical history significant for HTN, HLD, palpitations, and CAD s/p CABGx4 (November 2019) who presented to [facility] ED on 11/13 with syncope. ED workup revealed a Grade II Splenic laceration and free fluid in the abdomen. Admitted to Trauma  service with ICU and cardiology consults.     Significant Injuries:  - Grade II Splenic laceration with small perisplenic fluid    Incidentals/History:  - 2.2 cm x 2.2cm enhancing lesion at left kidney   > CT L-spine with lucent, if not lytic lesion and right sacrum - in the setting of kidney lesion cannot rule out malignancy   - ? Stenotic celiac artery; rule out median arcuate ligament syndrome    > no chronic symptoms. No recent weight loss.  Low suspicion   - air/fluid levels in small bowel with wall thickening concerning for enteritis   - CAD, HTN, HLD, statin-induced myalgias        #Acute Grade II Splenic laceration  - no acute surgical indication   - every 6 hour CBC in ICU  - transfuse for Hgb < 7, hemodynamic instability, or transfuse 5pk platelets if Hgb drops 2 grams or more (recent ASA use)  - NPO for now until next set of labs; anticipate CLD tonight     #syncope  #h/o HTN, CAD  - cardiology consult; appreciate recs  - maintain telemetry monitoring in ICU  - orthostatic vitals  - hold home lisinopril and ASA    #h/o HLD with h/o statin-induced myalgias and left shoulder/back pain  - HOLD all statin medications  - outpatient follow up    #enteritis  #? esophagitis  - IVF for hydration   - IV PPI   - monitor stool characteristics     #Left kidney lesion  - urology consult while inpatient; anticipate outpatient workup with CT kidney (avoid at this time to minimize YEYO)     Ppx:  SCDs only with splenic laceration. If H&H stable, can start SQH for ppx in 48 hours  Ambulate    Dispo:  Admit to ICU under trauma team. If H&H stable in 24 hours, transfer to tele for ongoing cardiac workup. Anticipate discharge home when medically stable     I spent 60 minutes in the professional and overall care of this patient. Greater than 50% of this time was spent counseling patient, reviewing plan of care, and in coordination of care.     The patient was discussed with the attending surgeon Dr. Sari Brunner,  APRN, CNP  Trauma/General Surgery BUD  Phone: [phone]

## 2023-11-14 LAB
ANION GAP SERPL CALC-SCNC: 9 MMOL/L (ref 10–20)
BUN SERPL-MCNC: 12 MG/DL (ref 6–23)
CALCIUM SERPL-MCNC: 8.2 MG/DL (ref 8.6–10.3)
CHLORIDE SERPL-SCNC: 108 MMOL/L (ref 98–107)
CO2 SERPL-SCNC: 28 MMOL/L (ref 21–32)
CREAT SERPL-MCNC: 0.87 MG/DL (ref 0.5–1.3)
ERYTHROCYTE [DISTWIDTH] IN BLOOD BY AUTOMATED COUNT: 12.7 % (ref 11.5–14.5)
ERYTHROCYTE [DISTWIDTH] IN BLOOD BY AUTOMATED COUNT: 12.8 % (ref 11.5–14.5)
ERYTHROCYTE [DISTWIDTH] IN BLOOD BY AUTOMATED COUNT: 12.9 % (ref 11.5–14.5)
GFR SERPL CREATININE-BSD FRML MDRD: >90 ML/MIN/1.73M*2
GLUCOSE SERPL-MCNC: 100 MG/DL (ref 74–99)
HCT VFR BLD AUTO: 41.5 % (ref 41–52)
HCT VFR BLD AUTO: 41.5 % (ref 41–52)
HCT VFR BLD AUTO: 42.7 % (ref 41–52)
HGB BLD-MCNC: 13.7 G/DL (ref 13.5–17.5)
HGB BLD-MCNC: 13.7 G/DL (ref 13.5–17.5)
HGB BLD-MCNC: 14.2 G/DL (ref 13.5–17.5)
MAGNESIUM SERPL-MCNC: 1.98 MG/DL (ref 1.6–2.4)
MCH RBC QN AUTO: 31.8 PG (ref 26–34)
MCH RBC QN AUTO: 32 PG (ref 26–34)
MCH RBC QN AUTO: 32.1 PG (ref 26–34)
MCHC RBC AUTO-ENTMCNC: 33 G/DL (ref 32–36)
MCHC RBC AUTO-ENTMCNC: 33 G/DL (ref 32–36)
MCHC RBC AUTO-ENTMCNC: 33.3 G/DL (ref 32–36)
MCV RBC AUTO: 96 FL (ref 80–100)
MCV RBC AUTO: 96 FL (ref 80–100)
MCV RBC AUTO: 97 FL (ref 80–100)
NRBC BLD-RTO: 0 /100 WBCS (ref 0–0)
PLATELET # BLD AUTO: 148 X10*3/UL (ref 150–450)
PLATELET # BLD AUTO: 153 X10*3/UL (ref 150–450)
PLATELET # BLD AUTO: 155 X10*3/UL (ref 150–450)
POTASSIUM SERPL-SCNC: 4.3 MMOL/L (ref 3.5–5.3)
PROT SERPL-MCNC: 4.7 G/DL (ref 6.4–8.2)
PSA SERPL-MCNC: 0.58 NG/ML
RBC # BLD AUTO: 4.27 X10*6/UL (ref 4.5–5.9)
RBC # BLD AUTO: 4.31 X10*6/UL (ref 4.5–5.9)
RBC # BLD AUTO: 4.44 X10*6/UL (ref 4.5–5.9)
SODIUM SERPL-SCNC: 141 MMOL/L (ref 136–145)
WBC # BLD AUTO: 5.3 X10*3/UL (ref 4.4–11.3)
WBC # BLD AUTO: 5.5 X10*3/UL (ref 4.4–11.3)
WBC # BLD AUTO: 6.7 X10*3/UL (ref 4.4–11.3)

## 2023-11-14 PROCEDURE — 99232 SBSQ HOSP IP/OBS MODERATE 35: CPT

## 2023-11-14 PROCEDURE — 83735 ASSAY OF MAGNESIUM: CPT | Performed by: STUDENT IN AN ORGANIZED HEALTH CARE EDUCATION/TRAINING PROGRAM

## 2023-11-14 PROCEDURE — 86320 SERUM IMMUNOELECTROPHORESIS: CPT | Performed by: STUDENT IN AN ORGANIZED HEALTH CARE EDUCATION/TRAINING PROGRAM

## 2023-11-14 PROCEDURE — 85027 COMPLETE CBC AUTOMATED: CPT | Performed by: REGISTERED NURSE

## 2023-11-14 PROCEDURE — 83521 IG LIGHT CHAINS FREE EACH: CPT | Mod: PARLAB | Performed by: STUDENT IN AN ORGANIZED HEALTH CARE EDUCATION/TRAINING PROGRAM

## 2023-11-14 PROCEDURE — 80048 BASIC METABOLIC PNL TOTAL CA: CPT | Performed by: REGISTERED NURSE

## 2023-11-14 PROCEDURE — C9113 INJ PANTOPRAZOLE SODIUM, VIA: HCPCS | Performed by: REGISTERED NURSE

## 2023-11-14 PROCEDURE — 36415 COLL VENOUS BLD VENIPUNCTURE: CPT | Performed by: REGISTERED NURSE

## 2023-11-14 PROCEDURE — 2500000004 HC RX 250 GENERAL PHARMACY W/ HCPCS (ALT 636 FOR OP/ED): Performed by: STUDENT IN AN ORGANIZED HEALTH CARE EDUCATION/TRAINING PROGRAM

## 2023-11-14 PROCEDURE — 86334 IMMUNOFIX E-PHORESIS SERUM: CPT | Mod: PARLAB | Performed by: STUDENT IN AN ORGANIZED HEALTH CARE EDUCATION/TRAINING PROGRAM

## 2023-11-14 PROCEDURE — 84153 ASSAY OF PSA TOTAL: CPT | Mod: PARLAB | Performed by: STUDENT IN AN ORGANIZED HEALTH CARE EDUCATION/TRAINING PROGRAM

## 2023-11-14 PROCEDURE — 2500000004 HC RX 250 GENERAL PHARMACY W/ HCPCS (ALT 636 FOR OP/ED): Performed by: REGISTERED NURSE

## 2023-11-14 PROCEDURE — 84165 PROTEIN E-PHORESIS SERUM: CPT | Performed by: STUDENT IN AN ORGANIZED HEALTH CARE EDUCATION/TRAINING PROGRAM

## 2023-11-14 PROCEDURE — 85027 COMPLETE CBC AUTOMATED: CPT

## 2023-11-14 PROCEDURE — 99221 1ST HOSP IP/OBS SF/LOW 40: CPT | Performed by: STUDENT IN AN ORGANIZED HEALTH CARE EDUCATION/TRAINING PROGRAM

## 2023-11-14 PROCEDURE — 2500000001 HC RX 250 WO HCPCS SELF ADMINISTERED DRUGS (ALT 637 FOR MEDICARE OP): Performed by: STUDENT IN AN ORGANIZED HEALTH CARE EDUCATION/TRAINING PROGRAM

## 2023-11-14 PROCEDURE — 1200000002 HC GENERAL ROOM WITH TELEMETRY DAILY

## 2023-11-14 RX ORDER — LISINOPRIL 10 MG/1
10 TABLET ORAL DAILY
Status: DISCONTINUED | OUTPATIENT
Start: 2023-11-14 | End: 2023-11-15 | Stop reason: HOSPADM

## 2023-11-14 RX ORDER — PANTOPRAZOLE SODIUM 40 MG/1
40 TABLET, DELAYED RELEASE ORAL
Status: CANCELLED | OUTPATIENT
Start: 2023-11-15

## 2023-11-14 RX ADMIN — DEXTROSE AND SODIUM CHLORIDE 75 ML/HR: 5; 450 INJECTION, SOLUTION INTRAVENOUS at 03:44

## 2023-11-14 RX ADMIN — Medication 1.5 G: at 14:30

## 2023-11-14 RX ADMIN — PANTOPRAZOLE SODIUM 40 MG: 40 INJECTION, POWDER, FOR SOLUTION INTRAVENOUS at 08:52

## 2023-11-14 RX ADMIN — Medication 1.5 G: at 08:30

## 2023-11-14 RX ADMIN — LISINOPRIL 10 MG: 10 TABLET ORAL at 11:58

## 2023-11-14 ASSESSMENT — COGNITIVE AND FUNCTIONAL STATUS - GENERAL
DAILY ACTIVITIY SCORE: 24
DAILY ACTIVITIY SCORE: 24
MOBILITY SCORE: 24
MOBILITY SCORE: 24

## 2023-11-14 ASSESSMENT — PAIN SCALES - GENERAL
PAINLEVEL_OUTOF10: 0 - NO PAIN

## 2023-11-14 ASSESSMENT — PAIN - FUNCTIONAL ASSESSMENT
PAIN_FUNCTIONAL_ASSESSMENT: 0-10

## 2023-11-14 NOTE — CARE PLAN
The patient's goals for the shift include comfort    The clinical goals for the shift include Remain hemodynamically stable and show no signs of bleeding r/t spleenic laceration    Over the shift, the patient did not make progress toward the following goals. Barriers to progression include slight pain to l side. Recommendations to address these barriers include stabilizing laceration to spleen.

## 2023-11-14 NOTE — PROGRESS NOTES
"Prosper Mitchell is a 68 y.o. male on day 1 of admission presenting with Splenic laceration, initial encounter.    Subjective   F/U left renal mass with lesion on sacrum concern for METS  -Feeling better today no new complaints        Objective     Physical Exam  A&O  Non-labored breathing   Sitting in chair  Last Recorded Vitals  Blood pressure 137/65, pulse 56, temperature 36.8 °C (98.2 °F), temperature source Temporal, resp. rate 23, height 1.778 m (5' 10\"), weight 83 kg (182 lb 15.7 oz), SpO2 98 %.  Intake/Output last 3 Shifts:  I/O last 3 completed shifts:  In: 1536.5 (18.5 mL/kg) [I.V.:1336.5 (16.1 mL/kg); IV Piggyback:200]  Out: - (0 mL/kg)   Weight: 83 kg     Relevant Results  Scheduled medications  ampicillin-sulbactam, 1.5 g, intravenous, q6h  atropine, 1 mg, intravenous, Once  pantoprazole, 40 mg, intravenous, Daily  perflutren lipid microspheres, 0.5-10 mL of dilution, intravenous, Once in imaging  pravastatin, 40 mg, oral, Nightly      Continuous medications  dextrose 5%-0.45 % sodium chloride, 75 mL/hr, Last Rate: 75 mL/hr (11/14/23 0344)  DOPamine, 5-10 mcg/kg/min      PRN medications  PRN medications: acetaminophen, HYDROmorphone, ondansetron **OR** ondansetron, polyethylene glycol    Results for orders placed or performed during the hospital encounter of 11/13/23 (from the past 24 hour(s))   Hemoglobin and hematocrit, blood   Result Value Ref Range    Hemoglobin 16.5 13.5 - 17.5 g/dL    Hematocrit 49.0 41.0 - 52.0 %   Lactate   Result Value Ref Range    Lactate 1.8 0.4 - 2.0 mmol/L   Blood Culture    Specimen: Peripheral Venipuncture; Blood culture   Result Value Ref Range    Blood Culture Loaded on Instrument - Culture in progress    Blood Culture    Specimen: Peripheral Venipuncture; Blood culture   Result Value Ref Range    Blood Culture Loaded on Instrument - Culture in progress    Transthoracic Echo (TTE) Complete   Result Value Ref Range    AV pk adebayo 1.89     AV mn grad 6.0     LVOT diam 2.00  "    LV biplane EF 64     MV E/A ratio 1.19     Tricuspid annular plane systolic excursion 1.9     LA vol index A/L 34.8     RV free wall pk S' 15.90     LVIDd 4.71     RVSP 40.9     Aortic Valve Area by Continuity of VTI 2.75     Aortic Valve Area by Continuity of Peak Velocity 2.24     AV pk grad 14.3     LV A4C EF 75.9    CBC   Result Value Ref Range    WBC 9.9 4.4 - 11.3 x10*3/uL    nRBC 0.0 0.0 - 0.0 /100 WBCs    RBC 4.78 4.50 - 5.90 x10*6/uL    Hemoglobin 15.2 13.5 - 17.5 g/dL    Hematocrit 45.6 41.0 - 52.0 %    MCV 95 80 - 100 fL    MCH 31.8 26.0 - 34.0 pg    MCHC 33.3 32.0 - 36.0 g/dL    RDW 12.7 11.5 - 14.5 %    Platelets 177 150 - 450 x10*3/uL   POCT GLUCOSE   Result Value Ref Range    POCT Glucose 127 (H) 74 - 99 mg/dL   CBC   Result Value Ref Range    WBC 6.7 4.4 - 11.3 x10*3/uL    nRBC 0.0 0.0 - 0.0 /100 WBCs    RBC 4.27 (L) 4.50 - 5.90 x10*6/uL    Hemoglobin 13.7 13.5 - 17.5 g/dL    Hematocrit 41.5 41.0 - 52.0 %    MCV 97 80 - 100 fL    MCH 32.1 26.0 - 34.0 pg    MCHC 33.0 32.0 - 36.0 g/dL    RDW 12.9 11.5 - 14.5 %    Platelets 148 (L) 150 - 450 x10*3/uL   CBC   Result Value Ref Range    WBC 5.3 4.4 - 11.3 x10*3/uL    nRBC 0.0 0.0 - 0.0 /100 WBCs    RBC 4.31 (L) 4.50 - 5.90 x10*6/uL    Hemoglobin 13.7 13.5 - 17.5 g/dL    Hematocrit 41.5 41.0 - 52.0 %    MCV 96 80 - 100 fL    MCH 31.8 26.0 - 34.0 pg    MCHC 33.0 32.0 - 36.0 g/dL    RDW 12.8 11.5 - 14.5 %    Platelets 153 150 - 450 x10*3/uL   Basic Metabolic Panel   Result Value Ref Range    Glucose 100 (H) 74 - 99 mg/dL    Sodium 141 136 - 145 mmol/L    Potassium 4.3 3.5 - 5.3 mmol/L    Chloride 108 (H) 98 - 107 mmol/L    Bicarbonate 28 21 - 32 mmol/L    Anion Gap 9 (L) 10 - 20 mmol/L    Urea Nitrogen 12 6 - 23 mg/dL    Creatinine 0.87 0.50 - 1.30 mg/dL    eGFR >90 >60 mL/min/1.73m*2    Calcium 8.2 (L) 8.6 - 10.3 mg/dL   Magnesium   Result Value Ref Range    Magnesium 1.98 1.60 - 2.40 mg/dL       Carotid duplex bilateral    Result Date: 11/13/2023            [place] [id number] Tel [phone] and Fax [phone]  Vascular Lab Report VASC US CAROTID ARTERY DUPLEX BILATERAL  Patient Name:      ROGER HERNANDEZ        Reading Physician:  [id number] Nikos Lockwood MD Study Date:        11/13/2023            Ordering Physician: [id number] JEANINE AUGUSTIN MRN/PID:           [id number]              Technologist:       Ruchi Duke RVT Accession#:        [id number]          Technologist 2: Date of Birth/Age: 1955 / 68 years Encounter#:         [id number] Gender:            M Admission Status:  Inpatient             Location Performed: [facility]  Diagnosis/ICD: Other specified symptoms and signs involving the circulatory and                respiratory systems-R09.89 CPT Codes:     50025 Cerebrovascular Carotid Duplex scan complete  CONCLUSIONS: Right Carotid: Findings are consistent with less than 50% stenosis of the right proximal internal carotid artery. Laminar flow seen by color Doppler. Right external carotid artery appears patent with no evidence of stenosis. No evidence of hemodynamically significant stenosis of the right common carotid artery. The right vertebral artery is patent with antegrade flow. There are elevated velocities in the right subclavian artery that are suggestive of disease. Left Carotid: Findings are consistent with less than 50% stenosis of the left proximal internal carotid artery. Laminar flow seen by color Doppler. Left external carotid artery appears patent with no evidence of stenosis. No evidence of hemodynamically significant stenosis of the left common carotid artery. The left vertebral artery is patent with antegrade flow. No evidence of hemodynamically significant stenosis in the left subclavian artery.   Comparison: Compared with study from 10/18/2019, no significant change.  Imaging & Doppler Findings: Right Plaque Morph: The proximal right internal carotid artery demonstrates heterogenous plaque. The distal right common carotid artery demonstrates calcified plaque. Left Plaque Morph: The proximal left internal carotid artery demonstrates calcified and heterogenous plaque. The distal left common carotid artery demonstrates calcified plaque.   Right                        Left   PSV      EDV                PSV       cm/s           CCA P    124 cm/s 95 cm/s            CCA D    108 cm/s 123 cm/s 18 cm/s   ICA P    93 cm/s  26 cm/s 96 cm/s  22 cm/s   ICA M    110 cm/s 25 cm/s 67 cm/s  21 cm/s   ICA D    87 cm/s  25 cm/s 130 cm/s            ECA     160 cm/s 86 cm/s          Vertebral  67 cm/s 295 cm/s         Subclavian 213 cm/s               Right Left ICA/CCA Ratio  1.3  0.9   [id number] Nikos Lockwood MD Electronically signed by [id number] Nikos Lockwood MD on 11/13/2023 at 7:18:00 PM  ** Final **     Transthoracic Echo (TTE) Complete    Result Date: 11/13/2023    [facility], [address] [place] [id number] [phone] and                                 Fax [phone] TRANSTHORACIC ECHOCARDIOGRAM REPORT  Patient Name:      ROGER Elmore Physician:    [id number] Jarad Huang MD Study Date:        11/13/2023           Ordering Provider:    [id number] JEANINE AUGUSTIN MRN/PID:           [id number]             Fellow: Accession#:        [id number]         Nurse: Date of Birth/Age: 1955 / 68      Sonographer:          Vincenzo newton RDCS, NATIVIDAD Gender:            M                    Additional Staff: Height:            177.80 cm            Admit Date:           11/13/2023 Weight:            86.18 kg             Admission Status:     Inpatient -                                                                Routine BSA:               2.04 m2              Encounter#:           [id number]                                         Department Location:  [address]                                                               floor/ICU Blood Pressure: 107 /56 mmHg Study Type:    TRANSTHORACIC ECHO (TTE) COMPLETE Diagnosis/ICD: Syncope and collapse-R55 Indication:    Syncope CPT Code:      Echo Complete w Full Doppler-20558 Patient History: Pertinent History: Syncope, CAD and A-Fib. Hx of CABG. Study Detail: The following Echo studies were performed: 2D, M-Mode, Doppler and               color flow. Technically challenging study due to prominent lung               artifact.  PHYSICIAN INTERPRETATION: Left Ventricle: The left ventricular systolic function is normal. There are no regional wall motion abnormalities. The left ventricular cavity size is normal. Spectral Doppler shows an impaired relaxation pattern of left ventricular diastolic filling. Left Atrium: The left atrium is mild to moderately dilated. Right Ventricle: The right ventricle is mildly enlarged. There is low normal right ventricular systolic function. Right Atrium: The right atrium is normal in size. Aortic Valve: The aortic valve is probably trileaflet. There is no evidence of aortic valve regurgitation. The peak instantaneous gradient of the aortic valve is 14.3 mmHg. The mean gradient of the aortic valve is 6.0 mmHg. Mitral Valve: The mitral valve is normal in structure. There is no evidence of mitral valve regurgitation. Tricuspid Valve: The tricuspid valve is structurally normal. No evidence of tricuspid regurgitation. The Doppler estimated RVSP is mildly elevated at 40.9 mmHg. Pulmonic Valve: The pulmonic valve is structurally normal. There is physiologic pulmonic valve regurgitation. Pericardium: There is no pericardial effusion noted. Aorta: The aortic root is normal.  CONCLUSIONS:  1. Left ventricular systolic  function is normal.  2. Spectral Doppler shows an impaired relaxation pattern of left ventricular diastolic filling.  3. There is low normal right ventricular systolic function.  4. The left atrium is mild to moderately dilated.  5. Mildly elevated RVSP. QUANTITATIVE DATA SUMMARY: 2D MEASUREMENTS:                          Normal Ranges: Ao Root d:     3.10 cm   (2.0-3.7cm) LAs:           4.60 cm   (2.7-4.0cm) IVSd:          0.76 cm   (0.6-1.1cm) LVPWd:         0.92 cm   (0.6-1.1cm) LVIDd:         4.71 cm   (3.9-5.9cm) LVIDs:         2.66 cm LV Mass Index: 64.2 g/m2 LV % FS        43.5 % LA VOLUME:                               Normal Ranges: LA Vol A4C:        73.7 ml    (22+/-6mL/m2) LA Vol A2C:        61.8 ml LA Vol BP:         71.1 ml LA Vol Index A4C:  36.1ml/m2 LA Vol Index A2C:  30.3 ml/m2 LA Vol Index BP:   34.8 ml/m2 LA Area A4C:       23.0 cm2 LA Area A2C:       20.0 cm2 LA Major Axis A4C: 6.1 cm LA Major Axis A2C: 5.5 cm LA Volume Index:   29.0 ml/m2 RA VOLUME BY A/L METHOD:                       Normal Ranges: RA Area A4C: 14.0 cm2 M-MODE MEASUREMENTS:                  Normal Ranges: Ao Root: 3.50 cm (2.0-3.7cm) LAs:     5.20 cm (2.7-4.0cm) AORTA MEASUREMENTS:                    Normal Ranges: Asc Ao, d: 3.30 cm (2.1-3.4cm) Ao Arch:   2.90 cm (2.0-3.6cm) LV SYSTOLIC FUNCTION BY 2D PLANIMETRY (MOD):                     Normal Ranges: EF-A4C View: 75.9 % (>=55%) EF-A2C View: 47.5 % EF-Biplane:  64.0 % LV DIASTOLIC FUNCTION:                        Normal Ranges: MV Peak E:    0.96 m/s (0.7-1.2 m/s) MV Peak A:    0.81 m/s (0.42-0.7 m/s) E/A Ratio:    1.19     (1.0-2.2) MV lateral e' 0.14 m/s MV medial e'  0.08 m/s MITRAL VALVE:                 Normal Ranges: MV DT: 258 msec (150-240msec) AORTIC VALVE:                                    Normal Ranges: AoV Vmax:                1.89 m/s  (<=1.7m/s) AoV Peak P.3 mmHg (<20mmHg) AoV Mean P.0 mmHg  (1.7-11.5mmHg) LVOT [place]:             1.35 m/s  (<=1.1m/s) AoV VTI:                 31.60 cm  (18-25cm) LVOT VTI:                27.70 cm LVOT Diameter:           2.00 cm   (1.8-2.4cm) AoV Area, VTI:           2.75 cm2  (2.5-5.5cm2) AoV Area,Vmax:           2.24 cm2  (2.5-4.5cm2) AoV Dimensionless Index: 0.88  RIGHT VENTRICLE: TAPSE: 18.7 mm RV s'  0.16 m/s TRICUSPID VALVE/RVSP:                             Normal Ranges: Peak TR Velocity: 3.08 m/s RV Syst Pressure: 40.9 mmHg (< 30mmHg) PULMONIC VALVE:                      Normal Ranges: PV [place]: 1.1 m/s  (0.6-0.9m/s) PV Max P.5 mmHg  [id number] Jarad Huang MD Electronically signed on 2023 at 3:37:08 PM  ** Final **     CT lumbar spine wo IV contrast    Result Date: 2023  Interpreted By:  Bob Wahl, STUDY: CT LUMBAR SPINE WO IV CONTRAST;  2023 7:07 am   INDICATION: Signs/Symptoms:truma.   COMPARISON: CTA chest, abdomen and pelvis earlier same day 2023   ACCESSION NUMBER(S): [id number]   ORDERING CLINICIAN: SANJUANITA OZUNA   TECHNIQUE: Helical CT of the lumbar spine from the thoracolumbar junction through the upper sacrum with sagittal and coronal reformatted images. No intravenous contrast   FINDINGS: COUNTING REFERENCE:  Lumbosacral junction. There are 5 lumbar type vertebral bodies; the L4-5 level is at the iliac crests and lowest lumbar type vertebral body is defined as L5   ALIGNMENT Subluxation / other acute traumatic alignment derangement:  No traumatic subluxation. Grade 1 anterolisthesis of L5 on S1 with associated chronic bilateral L5 pars defects Other alignment abnormality:  Negative   ACUTE FRACTURE:  Negative. No acute fracture of the lumbar spine or any other included osseous structure   PARS DEFECT/S:  Chronic bilateral   AGGRESSIVE OSSEOUS LESION:  Lucent if not lytic right sacral lesion, coronal 85 for example. Another lucent but more benign-appearing left iliac bone lesion, axial series 205, image 107. Sclerotic L2 lesion on the right is  benign-appearing   DISC SPACES:  Maintained throughout.  No significant disc volume loss in the lumbar spine   BONY CANAL AND FORAMINA:  No significant bony canal or bony foraminal stenosis   SACROILIAC JOINTS:  Unremarkable   INCIDENTALLY INCLUDED SOFT TISSUES:  CTA chest, abdomen and pelvis from earlier same day already reported separately described a renal mass. Refer to separate report   OTHER:  n/a       NO ACUTE FRACTURE OR SUBLUXATION IN THE LUMBAR SPINE   LUCENT IF NOT LYTIC RIGHT SACRAL LESION IN THE CONTEXT OF THE ENHANCING LEFT RENAL MASS IS A POTENTIAL METASTATIC LESION. ANOTHER LUCENT BUT MORE BENIGN-APPEARING LESION IS IN THE LEFT ILIAC BONE AND THERE IS ALSO A SCLEROTIC, BENIGN-APPEARING L2 LESION, NONE OF WHICH HAVE PERTINENT COMPARISON EXAMS OF THE LOCAL PACS ARCHIVE   MACRO: None   Signed by: Bob Wahl 11/13/2023 7:36 AM Dictation workstation:   [id number]    CT thoracic spine wo IV contrast    Result Date: 11/13/2023  Interpreted By:  Bob Wahl, STUDY: CT THORACIC SPINE WO IV CONTRAST;  11/13/2023 7:07 am   INDICATION: Signs/Symptoms:trauma.   COMPARISON: CTA chest, abdomen and pelvis earlier same day 13 November 2023   ACCESSION NUMBER(S): [id number]   ORDERING CLINICIAN: SANJUANITA OZUNA   TECHNIQUE: CT thoracic spine without intravenous contrast, including sagittal and coronal reformatted images   FINDINGS: COUNTING REFERENCE:  Cervico-Thoracic junction.  The uppermost rib-bearing vertebral body is defined as T1 and the lowest rib-bearing vertebral body is defined as T12.  This patient has twelve normal rib bearing vertebral bodies.   ALIGNMENT Subluxation / other acute traumatic alignment derangement:  Negative Other alignment abnormality:  Negative   ACUTE FRACTURE:  Negative. No acute fracture of the thoracic spine or any other included osseous structure   AGGRESSIVE OSSEOUS LESION:  Negative   DISC SPACES:  Maintained throughout.  No significant disc volume loss in the thoracic spine    BONY CANAL AND FORAMINA:  No significant bony canal or bony foraminal stenosis   INCIDENTALLY INCLUDED SOFT TISSUES:  Refer to more sensitive and specific CTA chest, abdomen or pelvis from earlier same day 13 November 2023, already reported separately   OTHER:  n/a       NO ACUTE FRACTURE OR SUBLUXATION IN THE THORACIC SPINE   MACRO: None   Signed by: Bob Wahl 11/13/2023 7:36 AM Dictation workstation:   [id number]    XR chest 1 view    Result Date: 11/13/2023  Interpreted By:  Graciela Thornton, STUDY: XR CHEST 1 VIEW;  11/13/2023 6:00 am   INDICATION: Signs/Symptoms: Syncope   COMPARISON: Chest x-ray 12/12/2019.   ACCESSION NUMBER(S): [id number]   ORDERING CLINICIAN: SANJUANITA OZUNA   TECHNIQUE: Portable upright frontal view of the chest was obtained .   FINDINGS: The cardiomediastinal silhouette is within normal limits. The patient is status post open heart surgery.   No focal consolidation, pleural effusion or pneumothorax.         1.  No radiographic evidence of acute cardiopulmonary process.       MACRO: None.   Signed by: Graciela Thornton 11/13/2023 6:31 AM Dictation workstation:   [id number]    CT angio chest abdomen pelvis    Result Date: 11/13/2023  Interpreted By:  Graciela Thornton, STUDY: CT ANGIO CHEST ABDOMEN PELVIS;  11/13/2023 5:35 am   INDICATION: Signs/Symptoms:Chest pain back pain concern for aortic dissection   COMPARISON: CT cardiac scoring 08/29/2019.   ACCESSION NUMBER(S): [id number]   ORDERING CLINICIAN: SANJUANITA OZUNA   TECHNIQUE: Noncontrast axial CT images of the chest, abdomen and pelvis were obtained prior to intravenous contrast administration. Axial CT images of the chest, abdomen and pelvis was obtained after uneventful intravenous administration of 140 mLOmnipaque 350 contrast material using CT angiographic technique. Coronal and sagittal images are reconstructed.  3-D and MIP reconstructions were performed on an independent workstation and provided for review.   FINDINGS:  NON-CONTRAST IMAGING:   No thoracic aortic aneurysm. No hyperdense intramural thrombus. No abdominal aortic aneurysm. Mild atherosclerotic calcifications at the abdominal and its branches No obstructing ureteral calculi.     POST-CONTRAST IMAGING:   VASCULAR: AORTA: No thoracic aortic aneurysm or acute dissection. No abdominal aortic aneurysm or acute dissection. The celiac artery, the superior mesenteric artery, the bilateral renal arteries and inferior mesenteric artery are patent. There is a U-shaped stenotic configuration with poststenotic dilation of the celiac artery. The bilateral common iliac arteries, the bilateral internal iliac arteries, the bilateral external iliac arteries, the bilateral common femoral arteries and the visualized bilateral proximal femoral arteries are patent.     CHEST:   HEART: Normal size. No pericardial effusion. The patient is status post open heart surgery. MEDIASTINUM AND CHARLES: No pathologically enlarged thoracic lymph nodes.  Fluid noted at the esophagus. LUNG, PLEURA, LARGE AIRWAYS: The trachea and the mainstem bronchi are patent.No consolidation, pleural effusion or pneumothorax. CHEST WALL AND LOWER NECK: There is bilateral gynecomastia.   ABDOMEN:   BONES: Multilevel degenerative changes at the spine. There is bilateral pars defect at L5. There is mild retrolisthesis of L4 on L5 and there is mild anterolisthesis of L5 on S1. ABDOMINAL WALL: Postsurgical changes are noted at the left inguinal region. There is a small fat containing right inguinal hernia..   LIVER: Unremarkable. No portal venous gas. BILE DUCTS: No significant dilation. GALLBLADDER: Present. PANCREAS: No peripancreatic soft tissue stranding. SPLEEN: There is a 2.0 cm linear lucency at the anterior aspect of the spleen, concerning for splenic laceration, grade 2 injury. There is a 2.4 cm linear lucency at the posterior aspect of the spleen. Additional linear lucency measuring 1.4 cm in length is noted at the  superior aspect of the spleen. There is a small amount of perisplenic free fluid. ADRENALS:  Unremarkable. KIDNEYS: Symmetric renal enhancement.  No hydronephrosis. There is a 2.2 x 2.2 cm enhancing lesion at the superior pole of the left kidney. URETERS: No hydroureter.   PELVIS:   REPRODUCTIVE ORGANS: The prostate measures 4.6 cm in transverse diameter. BLADDER: Partially distended.   RETROPERITONEUM: No retroperitoneal hemorrhage. BOWEL: The stomach is distended with enteric contents. No evidence for bowel obstruction. Air-fluid levels are noted at small bowel loops. There is wall thickening at small bowel loops at the mid abdomen with adjacent mesenteric edema. The ascending colon and the transverse colon are decompressed with diffuse wall thickening. Moderate amount of stool at the descending colon and sigmoid colon. No pneumatosis. Appendicoliths are noted at the appendix. The appendix is not dilated. PERITONEUM: Small amount of free fluid in the abdomen and pelvis. No free air. No pathologically enlarged lymph nodes are identified.       1. No aortic aneurysm or acute dissection. 2. U-shaped stenotic configuration with poststenotic dilation of the celiac artery. Please correlate with clinical concern for median arcuate ligament syndrome. 3. 2.0 cm linear lucency at the anterior aspect of the spleen, concerning for splenic laceration, grade 2 splenic injury. Additional linear lucency measuring 2.4 cm at the posterior aspect of the spleen and linear lucency measuring 1.4 cm in length at the superior aspect of the spleen, may represent splenic clefts versus splenic lacerations. Small amount of perisplenic free fluid. 4. Air-fluid levels at small bowel loops. Wall thickening at small bowel loops at the mid abdomen with adjacent mesenteric edema, suggestive of enteritis. This may be of acute infectious, inflammatory or ischemic etiology. Please correlate with lactate level. Mesenteric/bowel injury cannot be  excluded. Clinical correlation recommended. 5. Diffuse wall thickening at the ascending colon and transverse colon, may be secondary to underdistention versus colitis. 6. Small amount of free fluid in the abdomen and pelvis. 7. 2.2 x 2.2 cm enhancing lesion at the left kidney. Follow-up urology consult recommended. 8. Fluid at the esophagus, suggestive of gastroesophageal reflux.       MACRO: Graciela Thornton discussed the significance and urgency of this critical finding by telephone with  SANJUANITA OZUNA on 11/13/2023 at 6:21 am.  (**-RCF-**) Findings:  See findings.     Critical Finding:  New mass in the kidney. Notification was initiated on 11/13/2023 at 6:05 am by  Graciela Thornton.  (**-YCF-**) Instructions:  Urology consult.   Signed by: Graciela Thornton 11/13/2023 6:30 AM Dictation workstation:   [id number]    CT head wo IV contrast    Result Date: 11/13/2023  Interpreted By:  Graciela Thornton, STUDY: CT HEAD WO IV CONTRAST; CT CERVICAL SPINE WO IV CONTRAST;  11/13/2023 5:03 am; 11/13/2023 5:04 am   INDICATION: Signs/Symptoms:syncope; Signs/Symptoms:syncope head trauma.   COMPARISON: None   ACCESSION NUMBER(S): [id number]; [id number]   ORDERING CLINICIAN: SANJUANITA OZUNA   TECHNIQUE: Axial noncontrast images of the head  with coronal  and sagittal reconstructed images . Axial noncontrast images of the cervical spine with coronal and sagittal reconstructed images.   FINDINGS: BRAIN PARENCHYMA: The gray-white matter interfaces are preserved. No acute territorial infarct, mass effect or midline shift. HEMORRHAGE: No acute intracranial hemorrhage. VENTRICLES and EXTRA-AXIAL SPACES: Normal size. EXTRACRANIAL SOFT TISSUES: Within normal limits. CALVARIUM: No depressed calvarial fracture. PARANASAL SINUSES: No air-fluid levels. MASTOIDS: Within normal limits.   CERVICAL SPINE: ALIGNMENT: There is mild anterolisthesis of C4 on C5, probably on a degenerative basis. VERTEBRAE: No acute fracture. DISCS: There is degenerative  disc disease with osteophytosis at C5-C6 and C6-C7. Multilevel facet arthropathy. PREVERTEBRAL SOFT TISSUES: No prevertebral soft tissue swelling. LUNG APICES: No acute findings.         1. No acute intracranial hemorrhage or depressed calvarial fracture. 2. No acute fracture at the cervical spine. Degenerative changes.       MACRO: None.   Signed by: Graciela Thornton 11/13/2023 5:38 AM Dictation workstation:   [id number]    CT cervical spine wo IV contrast    Result Date: 11/13/2023  Interpreted By:  Graciela Thornton, STUDY: CT HEAD WO IV CONTRAST; CT CERVICAL SPINE WO IV CONTRAST;  11/13/2023 5:03 am; 11/13/2023 5:04 am   INDICATION: Signs/Symptoms:syncope; Signs/Symptoms:syncope head trauma.   COMPARISON: None   ACCESSION NUMBER(S): [id number]; [id number]   ORDERING CLINICIAN: SANJUANITA OZUNA   TECHNIQUE: Axial noncontrast images of the head  with coronal  and sagittal reconstructed images . Axial noncontrast images of the cervical spine with coronal and sagittal reconstructed images.   FINDINGS: BRAIN PARENCHYMA: The gray-white matter interfaces are preserved. No acute territorial infarct, mass effect or midline shift. HEMORRHAGE: No acute intracranial hemorrhage. VENTRICLES and EXTRA-AXIAL SPACES: Normal size. EXTRACRANIAL SOFT TISSUES: Within normal limits. CALVARIUM: No depressed calvarial fracture. PARANASAL SINUSES: No air-fluid levels. MASTOIDS: Within normal limits.   CERVICAL SPINE: ALIGNMENT: There is mild anterolisthesis of C4 on C5, probably on a degenerative basis. VERTEBRAE: No acute fracture. DISCS: There is degenerative disc disease with osteophytosis at C5-C6 and C6-C7. Multilevel facet arthropathy. PREVERTEBRAL SOFT TISSUES: No prevertebral soft tissue swelling. LUNG APICES: No acute findings.         1. No acute intracranial hemorrhage or depressed calvarial fracture. 2. No acute fracture at the cervical spine. Degenerative changes.       MACRO: None.   Signed by: Graciela Thornton 11/13/2023  5:38 AM Dictation workstation:   [id number]           This patient currently has cardiac telemetry ordered; if you would like to modify or discontinue the telemetry order, click here to go to the orders activity to modify/discontinue the order.                 Assessment/Plan   Principal Problem:    Splenic laceration, initial encounter    68 yr old male with a left renal mass with lesion in sacrum.   -He will need PET CT as outpatient F/U in 1 week after DC  -His main concern at this time is the splenic laceration.  -Consult Oncology Consult         I spent 15 minutes in the professional and overall care of this patient.      Miguel Queen PA-C

## 2023-11-14 NOTE — PROGRESS NOTES
"Prosper Mitchell is a 68 y.o. male on day 1 of admission presenting with Splenic laceration, initial encounter.    Subjective   Pt has no complaints. He denies further pre-syncopal sxs. Vitals have been stable. He has no abd pain or N/V, and is tolerating his diet.        Objective     Physical Exam  Constitutional:       General: He is not in acute distress.     Appearance: He is not ill-appearing.   HENT:      Head: Atraumatic.      Mouth/Throat:      Mouth: Mucous membranes are moist.   Eyes:      Extraocular Movements: Extraocular movements intact.      Pupils: Pupils are equal, round, and reactive to light.   Cardiovascular:      Rate and Rhythm: Normal rate and regular rhythm.      Heart sounds: Normal heart sounds. No murmur heard.  Pulmonary:      Effort: Pulmonary effort is normal. No respiratory distress.      Breath sounds: Normal breath sounds.   Abdominal:      General: Abdomen is flat. Bowel sounds are normal. There is no distension.      Palpations: Abdomen is soft.      Tenderness: There is no abdominal tenderness.   Musculoskeletal:         General: No swelling. Normal range of motion.      Cervical back: Normal range of motion.   Skin:     General: Skin is warm and dry.      Coloration: Skin is not jaundiced or pale.   Neurological:      General: No focal deficit present.      Mental Status: He is alert and oriented to person, place, and time.      Comments: GCS15         Last Recorded Vitals  Blood pressure 162/74, pulse 79, temperature 37 °C (98.6 °F), temperature source Temporal, resp. rate 23, height 1.778 m (5' 10\"), weight 83 kg (182 lb 15.7 oz), SpO2 98 %.  Intake/Output last 3 Shifts:  I/O last 3 completed shifts:  In: 1536.5 (18.5 mL/kg) [I.V.:1336.5 (16.1 mL/kg); IV Piggyback:200]  Out: - (0 mL/kg)   Weight: 83 kg     Relevant Results  Hemoglobin   Date/Time Value Ref Range Status   11/14/2023 05:30 AM 13.7 13.5 - 17.5 g/dL Final   11/14/2023 12:03 AM 13.7 13.5 - 17.5 g/dL Final "   11/13/2023 06:36 PM 15.2 13.5 - 17.5 g/dL Final   11/13/2023 09:51 AM 16.5 13.5 - 17.5 g/dL Final   11/13/2023 06:39 AM 15.5 13.5 - 17.5 g/dL Final     WBC   Date/Time Value Ref Range Status   11/14/2023 05:30 AM 5.3 4.4 - 11.3 x10*3/uL Final     Urea Nitrogen   Date/Time Value Ref Range Status   11/14/2023 05:30 AM 12 6 - 23 mg/dL Final     Creatinine   Date/Time Value Ref Range Status   11/14/2023 05:30 AM 0.87 0.50 - 1.30 mg/dL Final         Assessment/Plan   Principal Problem:    Splenic laceration, initial encounter    Prosper Mitchell is a 68 y.o. male with a past medical history significant for HTN, HLD, palpitations, and CAD s/p CABGx4 (November 2019) who presented to [facility] ED on 11/13 with syncope. ED workup revealed a Grade II Splenic laceration and free fluid in the abdomen. Admitted to Trauma service with ICU and cardiology consults.      Significant Injuries:  - Grade II Splenic laceration with small perisplenic fluid     Incidentals/History:  - 2.2 cm x 2.2cm enhancing lesion at left kidney   > CT L-spine with lucent, if not lytic lesion and right sacrum - in the setting of kidney lesion cannot rule out malignancy   - ? Stenotic celiac artery; rule out median arcuate ligament syndrome    > no chronic symptoms. No recent weight loss.  Low suspicion   - air/fluid levels in small bowel with wall thickening concerning for enteritis   - CAD, HTN, HLD, statin-induced myalgias      Assessment and Plan:      #Acute Grade II Splenic laceration  - Hgb relatively stable  - No acute surgical indication   - CBC Q12 x24 hrs  - Transfuse for Hgb < 7, hemodynamic instability  - Regular diet     #syncope - likely orthostatic  #h/o HTN, CAD  - Cardiology consult; appreciate recs  - Continuous tele  - Orthostatic vitals  - Hold home lisinopril and ASA     #h/o HLD with h/o statin-induced myalgias and left shoulder/back pain  - HOLD all statin medications  - outpatient follow up     #enteritis  #? esophagitis  - IV PPI    - No need for abx given absence of sxs     #Left kidney lesion  - Urology consult, appreciate recs    > Outpatient PET  - Oncology consult, appreciate recs    > Outpatient MRI vs PET     Ppx:  SCDs only with splenic laceration. If H&H stable, can start SQH for ppx AM of 11/15.  Ambulate     Dispo:  Transfer to [facility] for ongoing workup. Anticipate discharge home when medically stable, potentially tomorrow.    Patient seen and discussed with attending surgeon, Dr. Guo.    I spent 30 minutes in the professional and overall care of this patient.      Estephania Vincent PA-C

## 2023-11-14 NOTE — CARE PLAN
Problem: Pain - Adult  Goal: Verbalizes/displays adequate comfort level or baseline comfort level  Outcome: Progressing     Problem: Safety - Adult  Goal: Free from fall injury  Outcome: Progressing     Problem: Discharge Planning  Goal: Discharge to home or other facility with appropriate resources  Outcome: Progressing     Problem: Chronic Conditions and Co-morbidities  Goal: Patient's chronic conditions and co-morbidity symptoms are monitored and maintained or improved  Outcome: Progressing     Problem: Fall/Injury  Goal: Not fall by end of shift  Outcome: Progressing  Goal: Be free from injury by end of the shift  Outcome: Progressing  Goal: Verbalize understanding of personal risk factors for fall in the hospital  Outcome: Progressing    The patient's goals for the shift include remain comfortable    The clinical goals for the shift include remain hemodynamically stable and show no signs of bleeding r/t splenic laceration    Barriers to progression include splenic laceration and weakness. Recommendations to address these barriers include monitor hemoglobin, assess for abdominal pain/tenderness, and fall precautions.

## 2023-11-14 NOTE — CARE PLAN
The patient's goals for the shift include remain comfortable    The clinical goals for the shift include remain hemodynamically stable and show no signs of bleeding r/t splenic laceration    Over the shift, the patient did not make progress toward the following goals. Barriers to progression include ***. Recommendations to address these barriers include ***.

## 2023-11-14 NOTE — CONSULTS
Inpatient consult to Oncology  Consult performed by: John Luke DO  Consult ordered by: Miguel Queen PA-C      Prosper Mitchell is a 68 y.o. who presents for Syncope (Pt states he had gone to the bathroom and after that went to go to his room. He states that he felt hot and clammy. Wife states he was grey/white and passed out. Pt lost control of bladder/bowel).    HPI:  This is a 68yom with history listed below presenting after a syncopal episode at home.   He says he stood up and suddenly felt extremely diaphoretic, nauseated, and lightheaded resulting in a fall.   No reported convulsions however did have incontinence of bowel and bladder.  On arrival, he was hypotensive and bradycardic and was given atropine in the ED with good response.  He was found to have a grade 2 splenic laceration as well as a few other incidental findings on his scans.  Included in these findings was an enhancing L renal lesions as well as lucent lesions involving the sacrum and iliac bone.  Hematology consulted for this.    All other systems were reviewed and are negative unless stated.     Medical history: CAD, HTN, HLD, GERD  Surgical history: none  Family history: HTN  Social history: no smoking    Physical Exam:  Physical Exam:   General appearance: no distress, well-appearing   HEENT: Atraumatic/normocephalic, moist mucosa  Neck: no obvious deformity, JVP WNL  Respiratory: good air movement, appropriate respiratory effort, no wheezing or crackles  Cardiovascular: regular rate, regular rhythm, no peripheral edema  Abdomen: no organomegaly  Extremities: strong peripheral pulses, no grossly obvious deformities   Skin: intact, no rashes   Neurologic: Alert and oriented x 3, No obvious focal deficit  Psych: appropriate mood & affect, cooperative    Visit Vitals  /74 (Patient Position: Standing)   Pulse 79   Temp 37 °C (98.6 °F) (Temporal)   Resp 23        All other imaging, labs, and recent documents were reviewed and  discussed.    Assessment & Plan:  68-year-old with chronic Orinase presenting after a fall at home.  Appear to be vasovagal in nature.  Incidentally found to have left renal nodule on CT scan.  Oncology consulted.    Renal incidentaloma  Seen on CT. Additional findings of questionable bone lesions.  This is hyper-enhancing and differential includes malignancy, pseudotumor, complex cystic lesion, other.  Could certainly be a renal lesion in the setting of fall and trauma/splenic laceration.  Dedicated imaging of the kidney with MRI vs additional PET would be helpful.  Tissue biopsy can be done as an outpatient if we can ensure patient has appropriate followup.    Please await attending attestation for finalized plan.    John Luke, DO    This note was entered with assistance of voice recognition software, minor typographic errors may be present.

## 2023-11-14 NOTE — PROGRESS NOTES
Prosper Mitchell is a 68 y.o. male on day 1 of admission presenting with Splenic laceration, initial encounter.    Subjective   Sitting up in recliner. Denies any acute complaints, including chest pain, SOB, abdominal pain, nausea, or vomiting. Tolerating full liquids without issues.    Objective   Physical Exam:   Constitutional: well developed, awake, alert  ENMT: mucous membranes moist, EOMI, conjunctivae clear  Head/Neck: normocephalic, atraumatic; supple, trachea midline  Respiratory/Thorax: patent airways, CTAB; no wheezes, rales, or rhonchi  Cardiovascular: RRR, no murmur appreciated  Gastrointestinal: soft, nondistended, non-tender, bowel sounds appreciated  Extremities: palpable peripheral pulses, no edema  Neurological: AO x3, no focal deficits  Psychological: appropriate mood and behavior  Skin: warm and dry    Scheduled Medications:   ampicillin-sulbactam, 1.5 g, intravenous, q6h  atropine, 1 mg, intravenous, Once  pantoprazole, 40 mg, intravenous, Daily  perflutren lipid microspheres, 0.5-10 mL of dilution, intravenous, Once in imaging  pravastatin, 40 mg, oral, Nightly    Continuous Medications:       PRN Medications:   PRN medications: acetaminophen, ondansetron **OR** ondansetron, polyethylene glycol    Daily progress:  11/14: H&H stable. Patient without pain and tolerating full liquid diet without issue. Will discontinue IVF and PRN Dilaudid. Continue Unasyn for now in setting of enteritis and colitis. Consider discontinuing if BCx are negative. Patient is awaiting oncology consult for his left renal lesion. Stable for transfer to telemetry for further cardiac monitoring given his syncopal event. May need Holter monitor upon DC. Will defer to cardiology. Discussed with general surgery team.    Assessment/Plan   This is a 68-year-old male, with history of CAD s/p CABG, HTN, HLD, and GERD, who initially presented from home to [facility] on 11/13/2023 following a syncopal event prior to arrival.  He had  gotten up early that morning to use the bathroom when he suddenly felt diaphoretic, nauseated, and lightheaded prior to passing out for a couple minutes at most per wife.  Wife attempted to help him back into bed, but he had slumped over onto the floor.  There was no witnessed seizure activity.  He did have bowel and bladder incontinence.       While in the ED, he became acutely bradycardic with HR of 49 and hypotensive with BP 71/45 in the setting of severe chest and back pain.  He did receive Atropine 1mg IVP.  Initial work-up notable for grade 2 splenic injury with small mount of perisplenic free fluid as well as enteritis and colitis.  He was also found to have 2.2 x 2.2 cm enhancing left renal lesion as well as lucent lesions involving right sacrum and left iliac bone.  There is also sclerotic benign-appearing L2 lesion.  Patient was admitted to ICU under trauma surgery service with critical care team on consult given his splenic laceration.     Neurological:  Right sacral lesion, lucent with question of lytic lesion seen on CT  Lucent, benign-appearing left iliac bone lesion  Sclerotic, benign-appearing L2 lesion  Currently at baseline AOx3.  - Analgesia: PRN Tylenol  - Avoid excess caths & drains  - Monitor for ICU delirium     Cardiovascular:  Syncope, likely vasovagal  Symptomatic bradycardia s/p atropine 1 mg IVP in the ED  Statin intolerance with myalgias  CAD s/p CABG  Hypertension  Hyperlipidemia  DDx for syncope include orthostatic, cardiogenic, or vasovagal.  Carotid US and TTE unremarkable.  - Cardiology following. Continue with telemetry monitoring. Discussed with patient and his wife regarding potential Holter monitor upon DC.  - Restart home Lisinopril. Hold home Pravastatin (given myalgias).     Respiratory:  Stable on RA.  No acute issues.     Gastrointestinal:  Enteritis with colitis, ascending and transverse colon  GERD  - Diet: NPO  - PPX: Protonix  - Antibiotics as under ID. Consider  discontinuing Unasyn if BCx negative.     Endocrine:  No hx DM.  - Accuchecks q4h while NPO.     Renal:  Left renal lesion, 2.2 x 2.2cm  Baseline SCr ~0.7-0.9  Lactate (-)  - Monitor & replete electrolytes PRN  - Urology and oncology consulted for left renal lesion. Will need further workup, including PET/CT, as outpatient.     Hematological:  Splenic laceration, grade II  - Trend CBC. Keep active T&S. Transfuse PRN for Hgb<7 or HDI.     Infection Disease:  Enteritis with colitis, ascending and transverse colon  UA (-) for infection.  - Continuing treating empirically with Unasyn (11/13 - ). Consider discontinuing if BCx negative.  - Follow BCx     Skin/MSK:  No acute issues.     Vent/O2: RA  Lines/Devices: PIV  Drips: -  ATBx: Unasyn (11/13 - )  Diet: Full liquids  IVF: -  PPX: SCDs only, Protonix  Code: FULL  Dispo: Telemetry     This is a preliminary note written by the resident. Please wait for attending addendum for finalization of note and recommendations.     Marie Nelson, DO  Internal Medicine PGY3

## 2023-11-14 NOTE — PROGRESS NOTES
"Cardiology Progress Note     Subjective:  Patient appears more healthy than yesterday, in good spirits as well.  He is in the chair eating with his wife and family medicine.  NAEON, HDS, consistently borderline bradycardic per telemetry.  Denies lightheadedness, sudden diaphoresis, chest pain, palpitations, SOB since I last saw him.  Provided education regarding vasovagal syncope.     Objective:     Scheduled Meds  ampicillin-sulbactam, 1.5 g, intravenous, q6h  atropine, 1 mg, intravenous, Once  pantoprazole, 40 mg, intravenous, Daily  perflutren lipid microspheres, 0.5-10 mL of dilution, intravenous, Once in imaging  pravastatin, 40 mg, oral, Nightly     /74 (Patient Position: Standing)   Pulse 79   Temp 36.8 °C (98.2 °F) (Temporal)   Resp 23   Ht 1.778 m (5' 10\")   Wt 83 kg (182 lb 15.7 oz)   SpO2 98%   BMI 26.26 kg/m²      Physical Exam:   GENERAL: Awake/alert/oriented, cooperative, conversant.  HEAD:  Normocephalic, atraumatic.  EYES:  Round and reactive.  ENT:  No nasal discharge, mucous membranes moist and pink.  NECK:  Atraumatic. No JVD, no carotid bruits bilaterally. No cervical lymphadenopathy.   CARDIOVASCULAR:  RRR, no murmur appreciated. S1 and S2 noted.   CHEST: Midline incision noted.  RESPIRATORY:  CTAB, no wheezes, rales, rhonchi.   ABDOMEN:  Soft, nontender, nondistended.   EXTREMITIES:  No peripheral edema, no calf tenderness bilaterally. Pulses palpable in UE and LE bilaterally.   SKIN:  Warm and dry.  NEUROLOGICAL:  Nonfocal grossly. CNII-XII grossly intact. AAOx3     Assessment/Plan:  Prosper Mitchell is a 68-year-old male with a PMHx positive for HTN, HLD, CAD s/p CABG x4 (11/2019), postop A-fib, GERD, recurrent pansinusitis, right-sided sciatica who presented from home to [facility] ED following a syncopal event prior to arrival.      #Vasovagal syncope  Differential: Neurocardiogenic vs orthostatic vs cardiac (arrhythmia, structural) vs neurologic  Patient endorsed prodromal symptoms " of rush of warmth sensation, diaphoresis, nausea, syncope post defecation (straining)  ECG negative for ischemic processes, troponins negative, TTE negative for wall motion abnormalities, patient responsive to atropine  -Orthostatic vitals ordered, not yet completed  -Hold statin  -Follow-up with Dr. Huang on OP basis       Kenney Cole DO   Internal Medicine PGY-1

## 2023-11-15 VITALS
OXYGEN SATURATION: 97 % | TEMPERATURE: 97.3 F | HEIGHT: 70 IN | BODY MASS INDEX: 26.35 KG/M2 | RESPIRATION RATE: 22 BRPM | DIASTOLIC BLOOD PRESSURE: 58 MMHG | WEIGHT: 184.08 LBS | SYSTOLIC BLOOD PRESSURE: 117 MMHG | HEART RATE: 55 BPM

## 2023-11-15 PROBLEM — S36.039A SPLENIC LACERATION, INITIAL ENCOUNTER: Status: RESOLVED | Noted: 2023-11-13 | Resolved: 2023-11-15

## 2023-11-15 LAB
ANION GAP SERPL CALC-SCNC: 8 MMOL/L (ref 10–20)
BUN SERPL-MCNC: 15 MG/DL (ref 6–23)
CALCIUM SERPL-MCNC: 8.8 MG/DL (ref 8.6–10.3)
CHLORIDE SERPL-SCNC: 107 MMOL/L (ref 98–107)
CO2 SERPL-SCNC: 31 MMOL/L (ref 21–32)
CREAT SERPL-MCNC: 0.96 MG/DL (ref 0.5–1.3)
ERYTHROCYTE [DISTWIDTH] IN BLOOD BY AUTOMATED COUNT: 12.6 % (ref 11.5–14.5)
GFR SERPL CREATININE-BSD FRML MDRD: 87 ML/MIN/1.73M*2
GLUCOSE SERPL-MCNC: 104 MG/DL (ref 74–99)
HCT VFR BLD AUTO: 42.3 % (ref 41–52)
HGB BLD-MCNC: 13.7 G/DL (ref 13.5–17.5)
KAPPA LC SERPL-MCNC: 0.96 MG/DL (ref 0.33–1.94)
KAPPA LC/LAMBDA SER: 0.02 {RATIO} (ref 0.26–1.65)
LAMBDA LC SERPL-MCNC: 43.16 MG/DL (ref 0.57–2.63)
MAGNESIUM SERPL-MCNC: 1.78 MG/DL (ref 1.6–2.4)
MCH RBC QN AUTO: 31.7 PG (ref 26–34)
MCHC RBC AUTO-ENTMCNC: 32.4 G/DL (ref 32–36)
MCV RBC AUTO: 98 FL (ref 80–100)
NRBC BLD-RTO: 0 /100 WBCS (ref 0–0)
PLATELET # BLD AUTO: 146 X10*3/UL (ref 150–450)
POTASSIUM SERPL-SCNC: 4.4 MMOL/L (ref 3.5–5.3)
RBC # BLD AUTO: 4.32 X10*6/UL (ref 4.5–5.9)
SODIUM SERPL-SCNC: 142 MMOL/L (ref 136–145)
WBC # BLD AUTO: 5.9 X10*3/UL (ref 4.4–11.3)

## 2023-11-15 PROCEDURE — 36415 COLL VENOUS BLD VENIPUNCTURE: CPT | Performed by: STUDENT IN AN ORGANIZED HEALTH CARE EDUCATION/TRAINING PROGRAM

## 2023-11-15 PROCEDURE — 2500000001 HC RX 250 WO HCPCS SELF ADMINISTERED DRUGS (ALT 637 FOR MEDICARE OP): Performed by: STUDENT IN AN ORGANIZED HEALTH CARE EDUCATION/TRAINING PROGRAM

## 2023-11-15 PROCEDURE — 99238 HOSP IP/OBS DSCHRG MGMT 30/<: CPT

## 2023-11-15 PROCEDURE — 85027 COMPLETE CBC AUTOMATED: CPT | Performed by: STUDENT IN AN ORGANIZED HEALTH CARE EDUCATION/TRAINING PROGRAM

## 2023-11-15 PROCEDURE — 99232 SBSQ HOSP IP/OBS MODERATE 35: CPT

## 2023-11-15 PROCEDURE — 80048 BASIC METABOLIC PNL TOTAL CA: CPT | Performed by: STUDENT IN AN ORGANIZED HEALTH CARE EDUCATION/TRAINING PROGRAM

## 2023-11-15 PROCEDURE — 83735 ASSAY OF MAGNESIUM: CPT | Performed by: STUDENT IN AN ORGANIZED HEALTH CARE EDUCATION/TRAINING PROGRAM

## 2023-11-15 RX ADMIN — LISINOPRIL 10 MG: 10 TABLET ORAL at 08:36

## 2023-11-15 ASSESSMENT — PAIN SCALES - GENERAL
PAINLEVEL_OUTOF10: 0 - NO PAIN

## 2023-11-15 ASSESSMENT — PAIN - FUNCTIONAL ASSESSMENT
PAIN_FUNCTIONAL_ASSESSMENT: 0-10

## 2023-11-15 NOTE — PROGRESS NOTES
"Cardiology Progress Note     Subjective:  NAEON, HDS, consistently borderline bradycardic per telemetry.  Denies lightheadedness, sudden diaphoresis, chest pain, palpitations, SOB since I last saw him.     Objective:     Scheduled Meds  ampicillin-sulbactam, 1.5 g, intravenous, q6h  atropine, 1 mg, intravenous, Once  pantoprazole, 40 mg, intravenous, Daily  perflutren lipid microspheres, 0.5-10 mL of dilution, intravenous, Once in imaging  pravastatin, 40 mg, oral, Nightly     /68 (BP Location: Left arm, Patient Position: Lying)   Pulse (!) 49   Temp 36.4 °C (97.5 °F) (Temporal)   Resp 22   Ht 1.778 m (5' 10\")   Wt 83.5 kg (184 lb 1.4 oz)   SpO2 97%   BMI 26.41 kg/m²      Physical Exam:   GENERAL: Awake/alert/oriented, cooperative, conversant.  HEAD:  Normocephalic, atraumatic.  EYES:  Round and reactive.  ENT:  No nasal discharge, mucous membranes moist and pink.  NECK:  Atraumatic. No JVD, no carotid bruits bilaterally. No cervical lymphadenopathy.   CARDIOVASCULAR:  RRR, no murmur appreciated. S1 and S2 noted.   CHEST: Midline incision noted.  RESPIRATORY:  CTAB, no wheezes, rales, rhonchi.   ABDOMEN:  Soft, nontender, nondistended.   EXTREMITIES:  No peripheral edema, no calf tenderness bilaterally. Pulses palpable in UE and LE bilaterally.   SKIN:  Warm and dry.  NEUROLOGICAL:  Nonfocal grossly. CNII-XII grossly intact. AAOx3     Assessment/Plan:  Prosper Mitchell is a 68-year-old male with a PMHx positive for HTN, HLD, CAD s/p CABG x4 (11/2019), postop A-fib, GERD, recurrent pansinusitis, right-sided sciatica who presented from home to [facility] ED following a syncopal event prior to arrival.      #Vasovagal syncope  Differential: Neurocardiogenic vs orthostatic vs cardiac (arrhythmia, structural) vs neurologic  Patient endorsed prodromal symptoms of rush of warmth sensation, diaphoresis, nausea, syncope post defecation (straining)  ECG negative for ischemic processes, troponins negative, TTE negative " for wall motion abnormalities, patient responsive to atropine  -Orthostatic vitals ordered, not yet completed  -Hold statin  -No event recorder on discharge   -Follow-up with Dr. Huang on OP basis       Kenney Cole DO   Internal Medicine PGY-1

## 2023-11-15 NOTE — CARE PLAN
The patient's goals for the shift include comfort    The clinical goals for the shift include Remain hemodynamically stable and show no signs of bleeding r/t spleenic laceration

## 2023-11-15 NOTE — DISCHARGE SUMMARY
Discharge Diagnosis  Splenic laceration, initial encounter    Issues Requiring Follow-Up  Bradycardia/syncope  Renal mass w/ possible bone lesions    Test Results Pending At Discharge  Pending Labs       Order Current Status    Serum Protein Electrophoresis + Immunofixation In process    Serum Protein Electrophoresis + Immunofixation In process    Blood Culture Preliminary result    Blood Culture Preliminary result            Hospital Course   Patient has a past medical history significant for HTN, HLD, palpitations, and CAD s/p CABG (November 2019) who presented to [facility] ED on 11/13 for syncope. Patient developed abdominal pain the evening prior to ED presentation. Unable to fully describe the characteristics of pain, only that it made it difficult for him to sleep last night. He got out of bed just before 4am to use the bathroom. Recalls having a normal BM. When walking back to bedroom, patient began to feel very unwell: clammy, hot, and lightheaded. Patient's wife recalls that her  walked loudly into the bedroom door waking her up. She asked him what was wrong, but he provided no verbal answer. She tried to help him into bed, but patient ultimately fell/slumped to the floor. Wife states that he did not hit anything during fall. He did have bowel/bladder incontinence. No seizure activity witnessed. Wife called EMS and patient was brought to [place] ED. No report of loss of pulse or apnea.      In ED patient developed symptomatic bradycardia to 40s requiring 1mg IV Atropine. Patient states he felt similar to when he passed out early this morning; lightheaded and dizzy along with clammy/hot. 12 lead EKG did not demonstrate any acute ST elevation, but did have ST depressions and patient received 325mg Aspirin. CTA C/A/P ordered to rule out dissection with new chest pain associated with bradycardia. This scan was negative for any aortic pathology, but did demonstrate Grade II Liver laceration with free fluid  in abdomen. Incidental finding of 2x2cm enhancing lesion in left kidney.     Pt was admitted to ICU under trauma service. Syncope workup unremarkable, and vitals remained stable with HR in 50s-60s. Hgb stable and abd pain improved. Pt was seen by cardiology, urology, and oncology while admitted. Pt to undergo outpatient workup of renal mass with urology and oncology. Cardiology did not recommend event monitor, pt to follow-up in 6-8 weeks.     Pertinent Physical Exam At Time of Discharge  Physical Exam  Constitutional:       Appearance: Normal appearance. He is not ill-appearing.   Eyes:      Extraocular Movements: Extraocular movements intact.      Pupils: Pupils are equal, round, and reactive to light.   Cardiovascular:      Rate and Rhythm: Normal rate and regular rhythm.      Heart sounds: Normal heart sounds.   Pulmonary:      Effort: Pulmonary effort is normal. No respiratory distress.      Breath sounds: Normal breath sounds.   Abdominal:      General: Abdomen is flat. Bowel sounds are normal. There is no distension.      Palpations: Abdomen is soft.      Tenderness: There is no abdominal tenderness.   Musculoskeletal:         General: No swelling. Normal range of motion.      Cervical back: Normal range of motion.   Skin:     General: Skin is warm and dry.      Coloration: Skin is not jaundiced or pale.   Neurological:      General: No focal deficit present.      Mental Status: He is alert and oriented to person, place, and time.         Home Medications     Medication List      CONTINUE taking these medications     aspirin 81 mg EC tablet   lisinopril 10 mg tablet; TAKE 1 TABLET BY MOUTH ONCE DAILY   pravastatin 20 mg tablet; Commonly known as: Pravachol; Take 1 tablet   (20 mg) by mouth once daily.       Outpatient Follow-Up  Future Appointments   Date Time Provider Department Center   1/23/2024 11:45 AM Jarad Huang MD [id number] [place]       Estephania Vincent PA-C

## 2023-11-15 NOTE — CARE PLAN
Patient remains stable for transfer to telemetry pending bed availability. Given that he has no critical care needs, ICU team will sign off. Please do not hesitate to reconsult with any questions or concerns.

## 2023-11-15 NOTE — PROGRESS NOTES
Patient seen and examined. Chart reviewed where relevant. Discussed findings and clinical plan with note author. Modifications or addendum made only as necessary, and agree with finalized documentation.    Stable. Tolerating diet. No abdominal pain. Orthostatics WNL.  Will follow up as outpatient with Dr. Huang.  Outpatient PET and Urology followup.  Will discharge today on a statin.    Thaddeus Guo MD, PhD  Available via [email or web address]

## 2023-11-16 ENCOUNTER — PATIENT OUTREACH (OUTPATIENT)
Dept: CARE COORDINATION | Facility: CLINIC | Age: 68
End: 2023-11-16
Payer: COMMERCIAL

## 2023-11-16 NOTE — PROGRESS NOTES
Discharge Facility:[facility]  Discharge Diagnosis:  Splenic laceration, initial encounter  Kidney lesion  Syncope, unspecified syncope type  Other specified symptoms and signs involving the circulatory and respiratory systems    Admission Date:11/13/2023  Discharge Date: 11/15/2023    PCP Appointment Date:Declined, will follow up with specialists at this time  Specialist Appointment Date: /Hem/Onc 11/20/2023  Hospital Encounter and Summary: Linked   See discharge assessment below for further details  Engagement  Call Start Time: 1530 (11/16/2023  3:33 PM)    Medications  Medications reviewed with patient/caregiver?: Yes (No Change of medications) (11/16/2023  3:33 PM)  Is the patient having any side effects they believe may be caused by any medication additions or changes?: No (11/16/2023  3:33 PM)  Does the patient have all medications ordered at discharge?: Not applicable (11/16/2023  3:33 PM)  Care Management Interventions: No intervention needed (11/16/2023  3:33 PM)  Is the patient taking all medications as directed (includes completed medication regime)?: Yes (11/16/2023  3:33 PM)    Appointments  Does the patient have a primary care provider?: Yes (11/16/2023  3:33 PM)  Care Management Interventions: -- (Will follow up with specialists) (11/16/2023  3:33 PM)    Patient Teaching  Does the patient have access to their discharge instructions?: Yes (11/16/2023  3:33 PM)  What is the patient's perception of their health status since discharge?: Same (11/16/2023  3:33 PM)  Is the patient/caregiver able to teach back the hierarchy of who to call/visit for symptoms/problems? PCP, Specialist, Home Health nurse, Urgent Care, ED, 911: Yes (11/16/2023  3:33 PM)    Wrap Up  Wrap Up Additional Comments: -- (Prosper states he is doing about the same, no longer lightheaded though. He will see Dr. Villareal 11/20/2023.) (11/16/2023  3:33 PM)

## 2023-11-17 ENCOUNTER — TELEPHONE (OUTPATIENT)
Dept: SURGERY | Facility: CLINIC | Age: 68
End: 2023-11-17
Payer: COMMERCIAL

## 2023-11-17 DIAGNOSIS — R11.0 NAUSEA: Primary | ICD-10-CM

## 2023-11-17 LAB
ALBUMIN: 3.2 G/DL (ref 3.4–5)
ALPHA 1 GLOBULIN: 0.2 G/DL (ref 0.2–0.6)
ALPHA 2 GLOBULIN: 0.5 G/DL (ref 0.4–1.1)
BACTERIA BLD CULT: NORMAL
BACTERIA BLD CULT: NORMAL
BETA GLOBULIN: 0.5 G/DL (ref 0.5–1.2)
GAMMA GLOBULIN: 0.3 G/DL (ref 0.5–1.4)
IMMUNOFIXATION COMMENT: ABNORMAL
M-PROTEIN 1: 0 G/DL
PATH REVIEW - SERUM IMMUNOFIXATION: ABNORMAL
PATH REVIEW-SERUM PROTEIN ELECTROPHORESIS: ABNORMAL
PROTEIN ELECTROPHORESIS COMMENT: ABNORMAL

## 2023-11-17 RX ORDER — ONDANSETRON 4 MG/1
4 TABLET, ORALLY DISINTEGRATING ORAL EVERY 8 HOURS PRN
Qty: 20 TABLET | Refills: 0 | Status: SHIPPED | OUTPATIENT
Start: 2023-11-17 | End: 2023-11-28 | Stop reason: ALTCHOICE

## 2023-11-17 NOTE — TELEPHONE ENCOUNTER
Patient called requesting an anti nausea medicine.  He's also inquiring if it's still normal to feel like this.  He uses the [facility] in [facility].

## 2023-11-20 ENCOUNTER — OFFICE VISIT (OUTPATIENT)
Dept: HEMATOLOGY/ONCOLOGY | Facility: CLINIC | Age: 68
End: 2023-11-20
Payer: COMMERCIAL

## 2023-11-20 VITALS
TEMPERATURE: 97.9 F | OXYGEN SATURATION: 99 % | HEART RATE: 77 BPM | WEIGHT: 182.98 LBS | BODY MASS INDEX: 27.1 KG/M2 | HEIGHT: 69 IN | RESPIRATION RATE: 20 BRPM | DIASTOLIC BLOOD PRESSURE: 72 MMHG | SYSTOLIC BLOOD PRESSURE: 129 MMHG

## 2023-11-20 DIAGNOSIS — N28.89 LEFT KIDNEY MASS: Primary | ICD-10-CM

## 2023-11-20 DIAGNOSIS — C79.51 MALIGNANT NEOPLASM METASTATIC TO BONE (MULTI): ICD-10-CM

## 2023-11-20 PROCEDURE — 99215 OFFICE O/P EST HI 40 MIN: CPT | Performed by: INTERNAL MEDICINE

## 2023-11-20 PROCEDURE — 3074F SYST BP LT 130 MM HG: CPT | Performed by: INTERNAL MEDICINE

## 2023-11-20 PROCEDURE — 1111F DSCHRG MED/CURRENT MED MERGE: CPT | Performed by: INTERNAL MEDICINE

## 2023-11-20 PROCEDURE — 3008F BODY MASS INDEX DOCD: CPT | Performed by: INTERNAL MEDICINE

## 2023-11-20 PROCEDURE — 1160F RVW MEDS BY RX/DR IN RCRD: CPT | Performed by: INTERNAL MEDICINE

## 2023-11-20 PROCEDURE — 1159F MED LIST DOCD IN RCRD: CPT | Performed by: INTERNAL MEDICINE

## 2023-11-20 PROCEDURE — 1126F AMNT PAIN NOTED NONE PRSNT: CPT | Performed by: INTERNAL MEDICINE

## 2023-11-20 PROCEDURE — 1036F TOBACCO NON-USER: CPT | Performed by: INTERNAL MEDICINE

## 2023-11-20 PROCEDURE — 3078F DIAST BP <80 MM HG: CPT | Performed by: INTERNAL MEDICINE

## 2023-11-20 ASSESSMENT — ANXIETY QUESTIONNAIRES
3. WORRYING TOO MUCH ABOUT DIFFERENT THINGS: NOT AT ALL
IF YOU CHECKED OFF ANY PROBLEMS ON THIS QUESTIONNAIRE, HOW DIFFICULT HAVE THESE PROBLEMS MADE IT FOR YOU TO DO YOUR WORK, TAKE CARE OF THINGS AT HOME, OR GET ALONG WITH OTHER PEOPLE: NOT DIFFICULT AT ALL
5. BEING SO RESTLESS THAT IT IS HARD TO SIT STILL: NOT AT ALL
1. FEELING NERVOUS, ANXIOUS, OR ON EDGE: SEVERAL DAYS
4. TROUBLE RELAXING: NOT AT ALL
7. FEELING AFRAID AS IF SOMETHING AWFUL MIGHT HAPPEN: NOT AT ALL
2. NOT BEING ABLE TO STOP OR CONTROL WORRYING: SEVERAL DAYS

## 2023-11-20 ASSESSMENT — PATIENT HEALTH QUESTIONNAIRE - PHQ9
SUM OF ALL RESPONSES TO PHQ9 QUESTIONS 1 AND 2: 0
1. LITTLE INTEREST OR PLEASURE IN DOING THINGS: NOT AT ALL
2. FEELING DOWN, DEPRESSED OR HOPELESS: NOT AT ALL

## 2023-11-20 ASSESSMENT — COLUMBIA-SUICIDE SEVERITY RATING SCALE - C-SSRS
6. HAVE YOU EVER DONE ANYTHING, STARTED TO DO ANYTHING, OR PREPARED TO DO ANYTHING TO END YOUR LIFE?: NO
2. HAVE YOU ACTUALLY HAD ANY THOUGHTS OF KILLING YOURSELF?: NO

## 2023-11-20 ASSESSMENT — PAIN SCALES - GENERAL: PAINLEVEL: 0-NO PAIN

## 2023-11-20 ASSESSMENT — ENCOUNTER SYMPTOMS
LOSS OF SENSATION IN FEET: 0
DEPRESSION: 0
OCCASIONAL FEELINGS OF UNSTEADINESS: 0

## 2023-11-20 NOTE — PROGRESS NOTES
IgG lambda free light chains 43.16.    IgG lambda M protein 0.1 g/dL in November 2023.    PSA 0.58 NG per mL in November 2023    Prosper Mitchell is a 68 y.o. who presents for Syncope (Pt states he had gone to the bathroom and after that went to go to his room. He states that he felt hot and clammy. Wife states he was grey/white and passed out. Pt lost control of bladder/bowel).     HPI:  This is a 68yom with history listed below presenting after a syncopal episode at home.   He says he stood up and suddenly felt extremely diaphoretic, nauseated, and lightheaded resulting in a fall.   No reported convulsions however did have incontinence of bowel and bladder.  On arrival, he was hypotensive and bradycardic and was given atropine in the ED with good response.  He was found to have a grade 2 splenic laceration as well as a few other incidental findings on his scans.  Included in these findings was an enhancing L renal lesions as well as lucent lesions involving the sacrum and iliac bone.  Hematology consulted for this.    The patient and his wife had come for follow-up on November 20, 2023 regarding further evaluation of accidentally discovered left kidney mass and possible bone lesions.  The patient was admitted to the hospital after a syncopal episode with possible splenic tear.  Imaging studies revealed possible left renal mass as well as bone lesions.  The patient is asymptomatic.     All other systems were reviewed and are negative unless stated.      Medical history: CAD, HTN, HLD, GERD  Surgical history: none  Family history: HTN  Social history: no smoking     Physical Exam:  Physical Exam:   General appearance: no distress, well-appearing   HEENT: Atraumatic/normocephalic, moist mucosa  Neck: no obvious deformity, JVP WNL  Respiratory: good air movement, appropriate respiratory effort, no wheezing or crackles  Cardiovascular: regular rate, regular rhythm, no peripheral edema  Abdomen: no  organomegaly  Extremities: strong peripheral pulses, no grossly obvious deformities   Skin: intact, no rashes   Neurologic: Alert and oriented x 3, No obvious focal deficit  Psych: appropriate mood & affect, cooperative     Visit Vitals  /74 (Patient Position: Standing)   Pulse 79   Temp 37 °C (98.6 °F) (Temporal)   Resp 23         All other imaging, labs, and recent documents were reviewed and discussed.     Assessment & Plan:  68-year-old with chronic Orinase presenting after a fall at home.  Appear to be vasovagal in nature.  Incidentally found to have left renal nodule on CT scan.  Oncology consulted.     Renal incidentaloma  Seen on CT. Additional findings of questionable bone lesions.  This is hyper-enhancing and differential includes malignancy, pseudotumor, complex cystic lesion, other.  Could certainly be a renal lesion in the setting of fall and trauma/splenic laceration.  Dedicated imaging of the kidney with MRI as well as a bone scan ordered.  Consider biopsies if necessary.  The patient is agreeable return in 3 weeks.

## 2023-11-20 NOTE — TELEPHONE ENCOUNTER
Called patient to make them aware of the request to follow up with Cardiology from Dr. Guo.  The patient verbalized he has an appointment January 23 with Dr. Posadas his cardiologist.  Before getting off the phone he stated he has intermittent black flecks in his stool.  I informed him to please keep a look out for coffee ground looking stool.  He verbalized understanding.

## 2023-11-25 ENCOUNTER — HOSPITAL ENCOUNTER (OUTPATIENT)
Dept: CARDIOLOGY | Facility: HOSPITAL | Age: 68
Discharge: HOME | End: 2023-11-25
Payer: COMMERCIAL

## 2023-11-25 LAB
ATRIAL RATE: 71 BPM
DIASTOLIC BLOOD PRESSURE: 61 MMHG
P AXIS: 19 DEGREES
P OFFSET: 194 MS
P ONSET: 136 MS
PR INTERVAL: 146 MS
Q ONSET: 209 MS
QRS COUNT: 11 BEATS
QRS DURATION: 80 MS
QT INTERVAL: 386 MS
QTC CALCULATION(BAZETT): 419 MS
QTC FREDERICIA: 408 MS
R AXIS: -12 DEGREES
SYSTOLIC BLOOD PRESSURE: 122 MMHG
T AXIS: 32 DEGREES
T OFFSET: 402 MS
VENTRICULAR RATE: 71 BPM

## 2023-11-25 PROCEDURE — 93005 ELECTROCARDIOGRAM TRACING: CPT

## 2023-11-28 ENCOUNTER — OFFICE VISIT (OUTPATIENT)
Dept: SURGERY | Facility: CLINIC | Age: 68
End: 2023-11-28
Payer: COMMERCIAL

## 2023-11-28 VITALS
SYSTOLIC BLOOD PRESSURE: 126 MMHG | RESPIRATION RATE: 17 BRPM | OXYGEN SATURATION: 99 % | HEIGHT: 70 IN | TEMPERATURE: 97.9 F | BODY MASS INDEX: 26.23 KG/M2 | DIASTOLIC BLOOD PRESSURE: 87 MMHG | WEIGHT: 183.2 LBS | HEART RATE: 79 BPM

## 2023-11-28 DIAGNOSIS — N28.89 LEFT KIDNEY MASS: ICD-10-CM

## 2023-11-28 DIAGNOSIS — C79.51 MALIGNANT NEOPLASM METASTATIC TO BONE (MULTI): Primary | ICD-10-CM

## 2023-11-28 DIAGNOSIS — S36.039D LACERATION OF SPLEEN, SUBSEQUENT ENCOUNTER: ICD-10-CM

## 2023-11-28 PROCEDURE — 3074F SYST BP LT 130 MM HG: CPT | Performed by: SURGERY

## 2023-11-28 PROCEDURE — 1036F TOBACCO NON-USER: CPT | Performed by: SURGERY

## 2023-11-28 PROCEDURE — 99212 OFFICE O/P EST SF 10 MIN: CPT | Performed by: SURGERY

## 2023-11-28 PROCEDURE — 1111F DSCHRG MED/CURRENT MED MERGE: CPT | Performed by: SURGERY

## 2023-11-28 PROCEDURE — 1126F AMNT PAIN NOTED NONE PRSNT: CPT | Performed by: SURGERY

## 2023-11-28 PROCEDURE — 1160F RVW MEDS BY RX/DR IN RCRD: CPT | Performed by: SURGERY

## 2023-11-28 PROCEDURE — 3079F DIAST BP 80-89 MM HG: CPT | Performed by: SURGERY

## 2023-11-28 PROCEDURE — 1159F MED LIST DOCD IN RCRD: CPT | Performed by: SURGERY

## 2023-11-28 PROCEDURE — 3008F BODY MASS INDEX DOCD: CPT | Performed by: SURGERY

## 2023-11-28 ASSESSMENT — ENCOUNTER SYMPTOMS
CARDIOVASCULAR NEGATIVE: 1
CHILLS: 1
ABDOMINAL PAIN: 1
RESPIRATORY NEGATIVE: 1
ACTIVITY CHANGE: 1
EYES NEGATIVE: 1

## 2023-11-28 ASSESSMENT — PAIN SCALES - GENERAL: PAINLEVEL: 0-NO PAIN

## 2023-11-28 NOTE — PROGRESS NOTES
Subjective   Patient ID: Prosper Mitchell is a 68 y.o. male who presents for Post-op (S/p splenic laceration/).  HPI  Patient has a past medical history significant for HTN, HLD, palpitations, and CAD s/p CABG (November 2019) who presented to [facility] ED on 11/13 for syncope. Patient developed abdominal pain the evening prior to ED presentation. Unable to fully describe the characteristics of pain, only that it made it difficult for him to sleep last night. He got out of bed just before 4am to use the bathroom. Recalls having a normal BM. When walking back to bedroom, patient began to feel very unwell: clammy, hot, and lightheaded. Patient's wife recalls that her  walked loudly into the bedroom door waking her up. She asked him what was wrong, but he provided no verbal answer. She tried to help him into bed, but patient ultimately fell/slumped to the floor. Wife states that he did not hit anything during fall. He did have bowel/bladder incontinence. No seizure activity witnessed. Wife called EMS and patient was brought to [place] ED. No report of loss of pulse or apnea.      In ED patient developed symptomatic bradycardia to 40s requiring 1mg IV Atropine. Patient states he felt similar to when he passed out early this morning; lightheaded and dizzy along with clammy/hot. 12 lead EKG did not demonstrate any acute ST elevation, but did have ST depressions and patient received 325mg Aspirin. CTA C/A/P ordered to rule out dissection with new chest pain associated with bradycardia. This scan was negative for any aortic pathology, but did demonstrate Grade II Liver laceration with free fluid in abdomen. Incidental finding of 2x2cm enhancing lesion in left kidney.      Pt was admitted to ICU under trauma service. Syncope workup unremarkable, and vitals remained stable with HR in 50s-60s. Hgb stable and abd pain improved. Pt was seen by cardiology, urology, and oncology while admitted. Pt to undergo outpatient workup of  renal mass with urology and oncology. Cardiology did not recommend event monitor, pt to follow-up in 6-8 weeks.     Returned to clinic 11/28/2023. Having some left flank pain. Gets cold easily. Awaiting PET and MRI of Abdomen. Will follow-up with oncology. No fatigue or syncope.    Review of Systems   Constitutional:  Positive for activity change and chills.   HENT: Negative.     Eyes: Negative.    Respiratory: Negative.     Cardiovascular: Negative.    Gastrointestinal:  Positive for abdominal pain.   Endocrine: Positive for cold intolerance.   Genitourinary: Negative.        Objective   Physical Exam  Constitutional:       Appearance: Normal appearance.   HENT:      Head: Atraumatic.      Nose: Nose normal.      Mouth/Throat:      Mouth: Mucous membranes are moist.   Cardiovascular:      Rate and Rhythm: Normal rate and regular rhythm.   Pulmonary:      Effort: Pulmonary effort is normal.      Breath sounds: Normal breath sounds.   Abdominal:      General: There is no distension.      Palpations: Abdomen is soft.      Tenderness: There is no guarding or rebound.      Comments: Tender to deep palpation on left side only.   Skin:     General: Skin is warm.   Neurological:      Mental Status: He is alert. Mental status is at baseline.   Psychiatric:         Mood and Affect: Mood normal.         Thought Content: Thought content normal.         Judgment: Judgment normal.         Assessment/Plan   Problem List Items Addressed This Visit             ICD-10-CM       Genitourinary and Reproductive    Left kidney mass N28.89     Awaiting PET and MRI of Abdomen. Will followup with oncology.            Hematology and Neoplasia    Malignant neoplasm metastatic to bone (CMS/HCC) - Primary C79.51     Awaiting PET and MRI of Abdomen. Will followup with oncology.            Musculoskeletal and Injuries    Laceration of spleen S36.039A     Can resume aspirin.  Followup for any surgical or endoscopic needs (ie colonoscopy).

## 2023-11-29 ENCOUNTER — TELEPHONE (OUTPATIENT)
Dept: HEMATOLOGY/ONCOLOGY | Facility: CLINIC | Age: 68
End: 2023-11-29
Payer: COMMERCIAL

## 2023-11-29 NOTE — TELEPHONE ENCOUNTER
Pt's wife Lyle called-Has questions regarding MRI that was ordered for Prosper. Appointment for MRI is scheduled for 12/6/23.

## 2023-11-30 ENCOUNTER — TELEPHONE (OUTPATIENT)
Dept: PRIMARY CARE | Facility: CLINIC | Age: 68
End: 2023-11-30
Payer: COMMERCIAL

## 2023-11-30 DIAGNOSIS — F41.9 ANXIETY: Primary | ICD-10-CM

## 2023-11-30 RX ORDER — ALPRAZOLAM 0.5 MG/1
TABLET ORAL
Qty: 3 TABLET | Refills: 0 | Status: SHIPPED | OUTPATIENT
Start: 2023-11-30 | End: 2024-01-23

## 2023-12-01 ENCOUNTER — HOSPITAL ENCOUNTER (OUTPATIENT)
Dept: RADIOLOGY | Facility: HOSPITAL | Age: 68
Discharge: HOME | End: 2023-12-01
Payer: COMMERCIAL

## 2023-12-01 DIAGNOSIS — C79.51 MALIGNANT NEOPLASM METASTATIC TO BONE (MULTI): ICD-10-CM

## 2023-12-01 DIAGNOSIS — N28.89 LEFT KIDNEY MASS: ICD-10-CM

## 2023-12-01 PROCEDURE — 78306 BONE IMAGING WHOLE BODY: CPT

## 2023-12-01 PROCEDURE — A9503 TC99M MEDRONATE: HCPCS | Performed by: INTERNAL MEDICINE

## 2023-12-01 PROCEDURE — 3430000001 HC RX 343 DIAGNOSTIC RADIOPHARMACEUTICALS: Performed by: INTERNAL MEDICINE

## 2023-12-01 PROCEDURE — 78306 BONE IMAGING WHOLE BODY: CPT | Performed by: RADIOLOGY

## 2023-12-01 RX ADMIN — TECHNETIUM TC 99M MEDRONATE 25.2 MILLICURIE: 25 INJECTION, POWDER, FOR SOLUTION INTRAVENOUS at 09:15

## 2023-12-06 ENCOUNTER — APPOINTMENT (OUTPATIENT)
Dept: RADIOLOGY | Facility: HOSPITAL | Age: 68
End: 2023-12-06
Payer: COMMERCIAL

## 2023-12-06 ENCOUNTER — HOSPITAL ENCOUNTER (OUTPATIENT)
Dept: RADIOLOGY | Facility: HOSPITAL | Age: 68
Discharge: HOME | End: 2023-12-06
Payer: COMMERCIAL

## 2023-12-06 DIAGNOSIS — C79.51 MALIGNANT NEOPLASM METASTATIC TO BONE (MULTI): ICD-10-CM

## 2023-12-06 DIAGNOSIS — N28.89 LEFT KIDNEY MASS: ICD-10-CM

## 2023-12-06 PROCEDURE — 74183 MRI ABD W/O CNTR FLWD CNTR: CPT

## 2023-12-06 PROCEDURE — A9575 INJ GADOTERATE MEGLUMI 0.1ML: HCPCS | Performed by: INTERNAL MEDICINE

## 2023-12-06 PROCEDURE — 74183 MRI ABD W/O CNTR FLWD CNTR: CPT | Performed by: RADIOLOGY

## 2023-12-06 PROCEDURE — 2550000001 HC RX 255 CONTRASTS: Performed by: INTERNAL MEDICINE

## 2023-12-06 RX ORDER — GADOTERATE MEGLUMINE 376.9 MG/ML
16 INJECTION INTRAVENOUS
Status: COMPLETED | OUTPATIENT
Start: 2023-12-06 | End: 2023-12-06

## 2023-12-06 RX ADMIN — GADOTERATE MEGLUMINE 16 ML: 376.9 INJECTION INTRAVENOUS at 13:30

## 2023-12-07 ENCOUNTER — APPOINTMENT (OUTPATIENT)
Dept: RADIOLOGY | Facility: HOSPITAL | Age: 68
End: 2023-12-07
Payer: COMMERCIAL

## 2023-12-08 DIAGNOSIS — C79.51 MALIGNANT NEOPLASM METASTATIC TO BONE (MULTI): ICD-10-CM

## 2023-12-08 DIAGNOSIS — N28.89 LEFT KIDNEY MASS: ICD-10-CM

## 2023-12-19 ENCOUNTER — OFFICE VISIT (OUTPATIENT)
Dept: HEMATOLOGY/ONCOLOGY | Facility: CLINIC | Age: 68
End: 2023-12-19
Payer: COMMERCIAL

## 2023-12-19 ENCOUNTER — HOSPITAL ENCOUNTER (OUTPATIENT)
Dept: CARDIOLOGY | Facility: HOSPITAL | Age: 68
Discharge: HOME | End: 2023-12-19
Payer: COMMERCIAL

## 2023-12-19 VITALS
OXYGEN SATURATION: 99 % | HEART RATE: 73 BPM | HEIGHT: 69 IN | WEIGHT: 180.34 LBS | DIASTOLIC BLOOD PRESSURE: 88 MMHG | TEMPERATURE: 97 F | RESPIRATION RATE: 18 BRPM | SYSTOLIC BLOOD PRESSURE: 140 MMHG | BODY MASS INDEX: 26.71 KG/M2

## 2023-12-19 DIAGNOSIS — C79.51 MALIGNANT NEOPLASM METASTATIC TO BONE (MULTI): ICD-10-CM

## 2023-12-19 DIAGNOSIS — N28.89 LEFT KIDNEY MASS: ICD-10-CM

## 2023-12-19 PROCEDURE — 1160F RVW MEDS BY RX/DR IN RCRD: CPT | Performed by: INTERNAL MEDICINE

## 2023-12-19 PROCEDURE — 99215 OFFICE O/P EST HI 40 MIN: CPT | Performed by: INTERNAL MEDICINE

## 2023-12-19 PROCEDURE — 1159F MED LIST DOCD IN RCRD: CPT | Performed by: INTERNAL MEDICINE

## 2023-12-19 PROCEDURE — 93005 ELECTROCARDIOGRAM TRACING: CPT

## 2023-12-19 PROCEDURE — 1126F AMNT PAIN NOTED NONE PRSNT: CPT | Performed by: INTERNAL MEDICINE

## 2023-12-19 PROCEDURE — 1036F TOBACCO NON-USER: CPT | Performed by: INTERNAL MEDICINE

## 2023-12-19 PROCEDURE — 3077F SYST BP >= 140 MM HG: CPT | Performed by: INTERNAL MEDICINE

## 2023-12-19 PROCEDURE — 3008F BODY MASS INDEX DOCD: CPT | Performed by: INTERNAL MEDICINE

## 2023-12-19 PROCEDURE — 3079F DIAST BP 80-89 MM HG: CPT | Performed by: INTERNAL MEDICINE

## 2023-12-19 ASSESSMENT — PAIN SCALES - GENERAL: PAINLEVEL: 0-NO PAIN

## 2023-12-19 NOTE — PROGRESS NOTES
IgG lambda free light chains 43.16.    IgG lambda M protein 0.1 g/dL in November 2023.    PSA 0.58 NG per mL in November 2023    Prosper Mitchell is a 68 y.o. who presents for Syncope (Pt states he had gone to the bathroom and after that went to go to his room. He states that he felt hot and clammy. Wife states he was grey/white and passed out. Pt lost control of bladder/bowel).     HPI:  This is a 68yom with history listed below presenting after a syncopal episode at home.   He says he stood up and suddenly felt extremely diaphoretic, nauseated, and lightheaded resulting in a fall.   No reported convulsions however did have incontinence of bowel and bladder.  On arrival, he was hypotensive and bradycardic and was given atropine in the ED with good response.  He was found to have a grade 2 splenic laceration as well as a few other incidental findings on his scans.  Included in these findings was an enhancing L renal lesions as well as lucent lesions involving the sacrum and iliac bone.  Hematology consulted for this.    The patient and his wife had come for follow-up on December 19, 2023 regarding further evaluation of accidentally discovered left kidney mass and possible bone lesions.  The patient was admitted to the hospital after a syncopal episode with possible splenic tear.  Imaging studies revealed possible left renal mass as well as bone lesions.  The patient is asymptomatic.     All other systems were reviewed and are negative unless stated.      Medical history: CAD, HTN, HLD, GERD  Surgical history: none  Family history: HTN  Social history: no smoking     Physical Exam:  Physical Exam:   General appearance: no distress, well-appearing   HEENT: Atraumatic/normocephalic, moist mucosa  Neck: no obvious deformity, JVP WNL  Respiratory: good air movement, appropriate respiratory effort, no wheezing or crackles  Cardiovascular: regular rate, regular rhythm, no peripheral edema  Abdomen: no  organomegaly  Extremities: strong peripheral pulses, no grossly obvious deformities   Skin: intact, no rashes   Neurologic: Alert and oriented x 3, No obvious focal deficit  Psych: appropriate mood & affect, cooperative   All other imaging, labs, and recent documents were reviewed and discussed.     Assessment & Plan:  68-year-old with chronic Orinase presenting after a fall at home.  Appear to be vasovagal in nature.  Incidentally found to have left renal nodule on CT scan.   Renal incidentaloma  MRI revealed a 2 cm lesion upper pole of left kidney strongly suggestive of neoplasm.  Bone scan also suggestive of uptake in left ilium suggestive of metastasis.  There are additional areas in the ribs suggestive of post traumatic lesions.  Keeping in mind that the patient had a recent fall at home.  Referred to urology services for further evaluation consider kidney biopsy.  The lesion is too small and less likely to have metastasized.  I have given a benefit of doubt.  If necessary consider a bone biopsy as well.  The patient understood appreciated all the details provided and was grateful.  Return in 3 weeks.

## 2023-12-20 ENCOUNTER — TELEPHONE (OUTPATIENT)
Dept: HEMATOLOGY/ONCOLOGY | Facility: HOSPITAL | Age: 68
End: 2023-12-20
Payer: COMMERCIAL

## 2023-12-20 DIAGNOSIS — C79.51 MALIGNANT NEOPLASM METASTATIC TO BONE (MULTI): Primary | ICD-10-CM

## 2023-12-20 NOTE — TELEPHONE ENCOUNTER
Patient saw urologist yesterday Dr Gomez, per Dr Mono sequeira to proceed with CT guided biopsy of the bone lesion and then see patient after biopsy within 3-4 weeks to determine plan going ahead. Appt made on 1/16/24 for now, patient to call if biopsy gets done sooner, patient has our office number to contact us. Referral and orders for IR placed in [facility], IR should contact patient, however the patient aware to call [phone] by end of next week if he does not hear from them. No further needs at this time.

## 2023-12-21 NOTE — PROGRESS NOTES
Blood pressure is currently well-controlled.  I recommend no change with her current med occasion.   Subjective   Patient ID: Prosper Mitchell is a 67 y.o. male who presents for Back Pain (Upper back pain during movement x 3 weeks.) and Shoulder Pain (Right shoulder pain x 3 weeks.).    HPI comes in with mid upper back pain right rhomboid region for about 3 weeks.  No trauma.    Review of Systems  Constitutional: NO F, chills, or sweats  Eyes: no blurred vision or visual disturbance  ENT: no hearing loss, no congestion, no nasal discharge, no hoarseness and no sore throat.   Cardiovascular: no chest pain, no edema, no palps and no syncope.   Respiratory: no cough,no s.o.b. and no wheezing  Gastrointestinal: no abdominal pain, No C/D no N/V, no blood in stools  Genitourinary: no dysuria, no change in urinary frequency, no urinary hesitancy and no feelings of urinary urgency.   Musculoskeletal: Back pain as per HPI  Objective   /62 (BP Location: Right arm, Patient Position: Sitting, BP Cuff Size: Adult)   Pulse 80   Wt 86.5 kg (190 lb 9.6 oz)   SpO2 97%   BMI 26.96 kg/m²     Physical Exam  gen- a & o x 3, nad, pleasant  Back-positive tight paraspinal muscles right thoracic region no bone tenderness  Assessment/Plan     1.  Midthoracic back spasms likely from somatic dysfunction of mid thoracic spine.  Would advise on anti-inflammatory Advil or Aleve or ibuprofen 2-3 times per day.  Can take muscle relaxer 10 mg p.o. nightly for spasms.  Advised on stretches and exercises as per handout twice per day.  If no improvement in the next 2 to 3 weeks please call we will consider x-rays of thoracic spine

## 2023-12-29 LAB
ATRIAL RATE: 104 BPM
ATRIAL RATE: 72 BPM
P AXIS: 18 DEGREES
P AXIS: 49 DEGREES
P OFFSET: 203 MS
P OFFSET: 206 MS
P ONSET: 148 MS
P ONSET: 152 MS
PR INTERVAL: 118 MS
PR INTERVAL: 138 MS
Q ONSET: 211 MS
Q ONSET: 217 MS
QRS COUNT: 12 BEATS
QRS COUNT: 17 BEATS
QRS DURATION: 80 MS
QRS DURATION: 82 MS
QT INTERVAL: 360 MS
QT INTERVAL: 386 MS
QTC CALCULATION(BAZETT): 422 MS
QTC CALCULATION(BAZETT): 473 MS
QTC FREDERICIA: 410 MS
QTC FREDERICIA: 432 MS
R AXIS: -13 DEGREES
R AXIS: -18 DEGREES
T AXIS: 47 DEGREES
T AXIS: 50 DEGREES
T OFFSET: 397 MS
T OFFSET: 404 MS
VENTRICULAR RATE: 104 BPM
VENTRICULAR RATE: 72 BPM

## 2024-01-03 PROBLEM — E66.3 OVERWEIGHT: Status: ACTIVE | Noted: 2024-01-03

## 2024-01-03 PROBLEM — M62.9 DISORDER OF MUSCLE: Status: ACTIVE | Noted: 2023-10-30

## 2024-01-03 PROBLEM — R94.39 CARDIOVASCULAR STRESS TEST ABNORMAL: Status: RESOLVED | Noted: 2024-01-03 | Resolved: 2024-01-03

## 2024-01-03 RX ORDER — ATORVASTATIN CALCIUM 40 MG/1
TABLET, FILM COATED ORAL
COMMUNITY
Start: 2019-09-06 | End: 2024-01-15

## 2024-01-04 ENCOUNTER — OFFICE VISIT (OUTPATIENT)
Dept: SURGERY | Facility: CLINIC | Age: 69
End: 2024-01-04
Payer: COMMERCIAL

## 2024-01-04 VITALS
WEIGHT: 180.6 LBS | BODY MASS INDEX: 26.75 KG/M2 | SYSTOLIC BLOOD PRESSURE: 148 MMHG | HEIGHT: 69 IN | OXYGEN SATURATION: 95 % | TEMPERATURE: 98.6 F | DIASTOLIC BLOOD PRESSURE: 82 MMHG | RESPIRATION RATE: 18 BRPM | HEART RATE: 75 BPM

## 2024-01-04 DIAGNOSIS — K40.20 NON-RECURRENT BILATERAL INGUINAL HERNIA WITHOUT OBSTRUCTION OR GANGRENE: Primary | ICD-10-CM

## 2024-01-04 PROBLEM — K40.90 RIGHT INGUINAL HERNIA: Status: ACTIVE | Noted: 2024-01-04

## 2024-01-04 PROCEDURE — 1036F TOBACCO NON-USER: CPT | Performed by: SURGERY

## 2024-01-04 PROCEDURE — 99213 OFFICE O/P EST LOW 20 MIN: CPT | Performed by: SURGERY

## 2024-01-04 PROCEDURE — 1125F AMNT PAIN NOTED PAIN PRSNT: CPT | Performed by: SURGERY

## 2024-01-04 PROCEDURE — 3077F SYST BP >= 140 MM HG: CPT | Performed by: SURGERY

## 2024-01-04 PROCEDURE — 3079F DIAST BP 80-89 MM HG: CPT | Performed by: SURGERY

## 2024-01-04 PROCEDURE — 3008F BODY MASS INDEX DOCD: CPT | Performed by: SURGERY

## 2024-01-04 PROCEDURE — 1159F MED LIST DOCD IN RCRD: CPT | Performed by: SURGERY

## 2024-01-04 RX ORDER — GABAPENTIN 300 MG/1
300 CAPSULE ORAL ONCE
Status: CANCELLED | OUTPATIENT
Start: 2024-01-04 | End: 2024-01-04

## 2024-01-04 RX ORDER — SODIUM CHLORIDE, SODIUM LACTATE, POTASSIUM CHLORIDE, CALCIUM CHLORIDE 600; 310; 30; 20 MG/100ML; MG/100ML; MG/100ML; MG/100ML
100 INJECTION, SOLUTION INTRAVENOUS CONTINUOUS
Status: CANCELLED | OUTPATIENT
Start: 2024-02-26

## 2024-01-04 RX ORDER — ACETAMINOPHEN 325 MG/1
975 TABLET ORAL ONCE
Status: CANCELLED | OUTPATIENT
Start: 2024-01-04 | End: 2024-01-04

## 2024-01-04 RX ORDER — CEFAZOLIN SODIUM 2 G/100ML
2 INJECTION, SOLUTION INTRAVENOUS ONCE
Status: CANCELLED | OUTPATIENT
Start: 2024-02-26 | End: 2024-01-04

## 2024-01-04 RX ORDER — SCOLOPAMINE TRANSDERMAL SYSTEM 1 MG/1
1 PATCH, EXTENDED RELEASE TRANSDERMAL
Status: CANCELLED | OUTPATIENT
Start: 2024-01-04 | End: 2024-01-07

## 2024-01-04 RX ORDER — HEPARIN SODIUM 5000 [USP'U]/ML
5000 INJECTION, SOLUTION INTRAVENOUS; SUBCUTANEOUS ONCE
Status: CANCELLED | OUTPATIENT
Start: 2024-01-04 | End: 2024-01-04

## 2024-01-04 RX ORDER — CELECOXIB 50 MG/1
400 CAPSULE ORAL ONCE
Status: CANCELLED | OUTPATIENT
Start: 2024-01-04 | End: 2024-01-04

## 2024-01-04 ASSESSMENT — ENCOUNTER SYMPTOMS
RESPIRATORY NEGATIVE: 1
CHILLS: 1
CARDIOVASCULAR NEGATIVE: 1
ACTIVITY CHANGE: 1
EYES NEGATIVE: 1
ABDOMINAL PAIN: 1

## 2024-01-04 ASSESSMENT — PAIN SCALES - GENERAL: PAINLEVEL: 4

## 2024-01-04 NOTE — ASSESSMENT & PLAN NOTE
Await IR biopsy results. If chemo is needed will delay.  Cardiology preop.  Not on AC.  Plan for robotic possible laparoscopic right inguinal hernia repair, possible left recurrent repair.

## 2024-01-04 NOTE — PROGRESS NOTES
Subjective   Patient ID: Prosper Mitchell is a 68 y.o. male who presents for Hernia (Right inguinal ).  HPI  Patient has a past medical history significant for HTN, HLD, palpitations, and CAD s/p CABG (November 2019) who presented to [facility] ED on 11/13 for syncope. Patient developed abdominal pain the evening prior to ED presentation. Unable to fully describe the characteristics of pain, only that it made it difficult for him to sleep last night. He got out of bed just before 4am to use the bathroom. Recalls having a normal BM. When walking back to bedroom, patient began to feel very unwell: clammy, hot, and lightheaded. Patient's wife recalls that her  walked loudly into the bedroom door waking her up. She asked him what was wrong, but he provided no verbal answer. She tried to help him into bed, but patient ultimately fell/slumped to the floor. Wife states that he did not hit anything during fall. He did have bowel/bladder incontinence. No seizure activity witnessed. Wife called EMS and patient was brought to [facility] ED. No report of loss of pulse or apnea.      In ED patient developed symptomatic bradycardia to 40s requiring 1mg IV Atropine. Patient states he felt similar to when he passed out early this morning; lightheaded and dizzy along with clammy/hot. 12 lead EKG did not demonstrate any acute ST elevation, but did have ST depressions and patient received 325mg Aspirin. CTA C/A/P ordered to rule out dissection with new chest pain associated with bradycardia. This scan was negative for any aortic pathology, but did demonstrate Grade II Liver laceration with free fluid in abdomen. Incidental finding of 2x2cm enhancing lesion in left kidney.      Pt was admitted to ICU under trauma service. Syncope workup unremarkable, and vitals remained stable with HR in 50s-60s. Hgb stable and abd pain improved. Pt was seen by cardiology, urology, and oncology while admitted. Pt to undergo outpatient workup of renal  mass with urology and oncology. Cardiology did not recommend event monitor, pt to follow-up in 6-8 weeks.     Returned to clinic 11/28/2023. Having some left flank pain. Gets cold easily. Awaiting PET and MRI of Abdomen. Will follow-up with oncology. No fatigue or syncope.    Saw oncology on 11/20/2023 and 12/19/2023. PET on 12/01/2023 and MRI 12/06/2023. Plans for IR biopsy of bone lesion of left posterior pelvic region. Oncology doubts mets simply due to the size of the left kidney lesion. Has cardiology appt at end of January and IR biopsy in mid January.    Returned to office 01/04/2024. Has right inguinal pain and direct inguinal hernia on CT ABD PEL 11/13/2023. Prior right open repair with tacks seen on imaging. Small recurrence seems likely. Some right lower quadrant pain too with known appendicoliths.    Review of Systems   Constitutional:  Positive for activity change and chills.   HENT: Negative.     Eyes: Negative.    Respiratory: Negative.     Cardiovascular: Negative.    Gastrointestinal:  Positive for abdominal pain.   Endocrine: Positive for cold intolerance.   Genitourinary: Negative.        Objective   Physical Exam  Constitutional:       Appearance: Normal appearance.   HENT:      Head: Atraumatic.      Nose: Nose normal.      Mouth/Throat:      Mouth: Mucous membranes are moist.   Cardiovascular:      Rate and Rhythm: Normal rate and regular rhythm.   Pulmonary:      Effort: Pulmonary effort is normal.      Breath sounds: Normal breath sounds.   Abdominal:      General: There is no distension.      Palpations: Abdomen is soft.      Tenderness: There is no guarding or rebound.      Hernia: A hernia is present.      Comments: Direct right inguinal hernia with valsalva.   Skin:     General: Skin is warm.   Neurological:      Mental Status: He is alert. Mental status is at baseline.   Psychiatric:         Mood and Affect: Mood normal.         Thought Content: Thought content normal.         Judgment:  Judgment normal.         Assessment/Plan   Problem List Items Addressed This Visit             ICD-10-CM       Gastrointestinal and Abdominal    Non-recurrent bilateral inguinal hernia without obstruction or gangrene - Primary K40.20     Await IR biopsy results. If chemo is needed will delay.  Cardiology preop.  Not on AC.  Plan for robotic possible laparoscopic right inguinal hernia repair, possible left recurrent repair.         Relevant Orders    Case Request Operating Room: Repair Inguinal Hernia Robot-Assisted (Completed)    CBC    Basic Metabolic Panel

## 2024-01-11 ENCOUNTER — HOSPITAL ENCOUNTER (OUTPATIENT)
Dept: RADIOLOGY | Facility: HOSPITAL | Age: 69
Discharge: HOME | End: 2024-01-11
Payer: COMMERCIAL

## 2024-01-11 VITALS
HEART RATE: 74 BPM | DIASTOLIC BLOOD PRESSURE: 83 MMHG | OXYGEN SATURATION: 95 % | RESPIRATION RATE: 18 BRPM | TEMPERATURE: 98.1 F | SYSTOLIC BLOOD PRESSURE: 137 MMHG

## 2024-01-11 DIAGNOSIS — C79.51 MALIGNANT NEOPLASM METASTATIC TO BONE (MULTI): ICD-10-CM

## 2024-01-11 PROCEDURE — 2720000007 HC OR 272 NO HCPCS

## 2024-01-11 PROCEDURE — 77012 CT SCAN FOR NEEDLE BIOPSY: CPT | Performed by: RADIOLOGY

## 2024-01-11 PROCEDURE — 99152 MOD SED SAME PHYS/QHP 5/>YRS: CPT | Performed by: RADIOLOGY

## 2024-01-11 PROCEDURE — 2500000004 HC RX 250 GENERAL PHARMACY W/ HCPCS (ALT 636 FOR OP/ED): Performed by: RADIOLOGY

## 2024-01-11 PROCEDURE — 88112 CYTOPATH CELL ENHANCE TECH: CPT | Performed by: PATHOLOGY

## 2024-01-11 PROCEDURE — 88341 IMHCHEM/IMCYTCHM EA ADD ANTB: CPT | Performed by: STUDENT IN AN ORGANIZED HEALTH CARE EDUCATION/TRAINING PROGRAM

## 2024-01-11 PROCEDURE — 2500000005 HC RX 250 GENERAL PHARMACY W/O HCPCS: Performed by: RADIOLOGY

## 2024-01-11 PROCEDURE — 88341 IMHCHEM/IMCYTCHM EA ADD ANTB: CPT | Performed by: PATHOLOGY

## 2024-01-11 PROCEDURE — 88305 TISSUE EXAM BY PATHOLOGIST: CPT | Performed by: STUDENT IN AN ORGANIZED HEALTH CARE EDUCATION/TRAINING PROGRAM

## 2024-01-11 PROCEDURE — 88342 IMHCHEM/IMCYTCHM 1ST ANTB: CPT | Performed by: STUDENT IN AN ORGANIZED HEALTH CARE EDUCATION/TRAINING PROGRAM

## 2024-01-11 PROCEDURE — 20225 BONE BIOPSY TROCAR/NDL DEEP: CPT | Performed by: RADIOLOGY

## 2024-01-11 PROCEDURE — 88305 TISSUE EXAM BY PATHOLOGIST: CPT | Mod: TC,SUR,PARLAB | Performed by: INTERNAL MEDICINE

## 2024-01-11 PROCEDURE — 88342 IMHCHEM/IMCYTCHM 1ST ANTB: CPT | Performed by: PATHOLOGY

## 2024-01-11 PROCEDURE — 99152 MOD SED SAME PHYS/QHP 5/>YRS: CPT

## 2024-01-11 PROCEDURE — C1830 POWER BONE MARROW BX NEEDLE: HCPCS

## 2024-01-11 PROCEDURE — 88305 TISSUE EXAM BY PATHOLOGIST: CPT | Performed by: PATHOLOGY

## 2024-01-11 PROCEDURE — 88311 DECALCIFY TISSUE: CPT | Performed by: STUDENT IN AN ORGANIZED HEALTH CARE EDUCATION/TRAINING PROGRAM

## 2024-01-11 PROCEDURE — 99153 MOD SED SAME PHYS/QHP EA: CPT | Performed by: RADIOLOGY

## 2024-01-11 PROCEDURE — 88112 CYTOPATH CELL ENHANCE TECH: CPT | Mod: TC | Performed by: INTERNAL MEDICINE

## 2024-01-11 PROCEDURE — 77012 CT SCAN FOR NEEDLE BIOPSY: CPT

## 2024-01-11 PROCEDURE — 99153 MOD SED SAME PHYS/QHP EA: CPT

## 2024-01-11 RX ORDER — FENTANYL CITRATE 50 UG/ML
INJECTION, SOLUTION INTRAMUSCULAR; INTRAVENOUS
Status: COMPLETED | OUTPATIENT
Start: 2024-01-11 | End: 2024-01-11

## 2024-01-11 RX ORDER — MIDAZOLAM HYDROCHLORIDE 1 MG/ML
INJECTION, SOLUTION INTRAMUSCULAR; INTRAVENOUS
Status: COMPLETED | OUTPATIENT
Start: 2024-01-11 | End: 2024-01-11

## 2024-01-11 RX ORDER — SODIUM CHLORIDE 9 MG/ML
INJECTION, SOLUTION INTRAVENOUS CONTINUOUS PRN
Status: COMPLETED | OUTPATIENT
Start: 2024-01-11 | End: 2024-01-11

## 2024-01-11 RX ADMIN — MIDAZOLAM 1 MG: 1 INJECTION INTRAMUSCULAR; INTRAVENOUS at 08:36

## 2024-01-11 RX ADMIN — FENTANYL CITRATE 50 MCG: 50 INJECTION, SOLUTION INTRAMUSCULAR; INTRAVENOUS at 08:35

## 2024-01-11 RX ADMIN — Medication 3 L/MIN: at 08:38

## 2024-01-11 RX ADMIN — SODIUM CHLORIDE 50 ML/HR: 9 INJECTION, SOLUTION INTRAVENOUS at 08:39

## 2024-01-11 ASSESSMENT — PAIN SCALES - GENERAL
PAINLEVEL_OUTOF10: 0 - NO PAIN

## 2024-01-11 ASSESSMENT — PAIN - FUNCTIONAL ASSESSMENT
PAIN_FUNCTIONAL_ASSESSMENT: 0-10

## 2024-01-11 NOTE — DISCHARGE INSTRUCTIONS
Patient educated on including but not limited to AVS, bong bone biopsy care and restrictions as well as sedation care and restrictions , when to call 911 or provider, signs and symtoms to watch for

## 2024-01-11 NOTE — PROCEDURES
Interventional Radiology Brief Postprocedure Note    Attending: Jean Marie Jarrett MD    Assistant:   Staff Role   Regina Rey, TANYA Radiology Nurse   Jean Marie Jarrett MD Radiologist       Diagnosis:   1. Malignant neoplasm metastatic to bone (CMS/HCC)  CT guided percutaneous biopsy bone    CT guided percutaneous biopsy bone          Description of procedure: CT guided percutaneous biopsy bone and aspiration right sacral lesion    Timeout:  Yes    Procedure Area: Procedure Area     Anesthesia:   Conscious Sedation    Complications: None    Estimated Blood Loss: minimal    Medications (Filter: Administrations occurring from 0827 to 0857 on 01/11/24) As of 01/11/24 0857      oxygen (O2) therapy (L/min) Total volume:  Not documented* Dosing weight:  81.9   *Total volume has not been documented. View each administration to see the amount administered.     Date/Time Rate/Dose/Volume Action       01/11/24  0838 3 L/min Start               fentaNYL PF (Sublimaze) injection (mcg) Total dose:  50 mcg      Date/Time Rate/Dose/Volume Action       01/11/24  0835 50 mcg Given               midazolam (Versed) injection (mg) Total dose:  1 mg      Date/Time Rate/Dose/Volume Action       01/11/24  0836 1 mg Given               sodium chloride 0.9% infusion (mL/hr) Total volume:  Not documented*   *Total volume has not been documented. View each administration to see the amount administered.     Date/Time Rate/Dose/Volume Action       01/11/24  0839 50 mL/hr New Bag                       See detailed result report with images in PACS.    The patient tolerated the procedure well without incident or complication and is in stable condition.

## 2024-01-11 NOTE — PRE-PROCEDURE NOTE
Interventional Radiology Preprocedure Note    Indication for procedure: The encounter diagnosis was Malignant neoplasm metastatic to bone (CMS/HCC).    Relevant review of systems: NA    Relevant Labs:   Lab Results   Component Value Date    CREATININE 0.96 11/15/2023    EGFR 87 11/15/2023    INR 1.2 (H) 11/05/2019    PROTIME 13.6 (H) 11/05/2019       Planned Sedation/Anesthesia: Moderate    Airway assessment: normal    Directed physical examination:    Aox3  No increased work of breathing.   No acute distress      Mallampati: II (hard and soft palate, upper portion of tonsils anduvula visible)    ASA Score: ASA 2 - Patient with mild systemic disease with no functional limitations    Benefits, risks and alternatives of procedure and planned sedation have been discussed with the patient and/or their representative. All questions answered and they agree to proceed.   
Is This A New Presentation, Or A Follow-Up?: Discoloration
How Severe Is It?: mild

## 2024-01-12 DIAGNOSIS — I25.10 ATHEROSCLEROTIC HEART DISEASE OF NATIVE CORONARY ARTERY WITHOUT ANGINA PECTORIS: ICD-10-CM

## 2024-01-15 RX ORDER — ATORVASTATIN CALCIUM 40 MG/1
40 TABLET, FILM COATED ORAL NIGHTLY
Qty: 90 TABLET | Refills: 3 | Status: SHIPPED | OUTPATIENT
Start: 2024-01-15 | End: 2024-01-23

## 2024-01-16 ENCOUNTER — APPOINTMENT (OUTPATIENT)
Dept: HEMATOLOGY/ONCOLOGY | Facility: CLINIC | Age: 69
End: 2024-01-16
Payer: COMMERCIAL

## 2024-01-16 ENCOUNTER — TELEPHONE (OUTPATIENT)
Dept: HEMATOLOGY/ONCOLOGY | Facility: CLINIC | Age: 69
End: 2024-01-16
Payer: COMMERCIAL

## 2024-01-16 NOTE — TELEPHONE ENCOUNTER
Patient called today that his biopsy results are not back yet, biopsy done on 1/11. Patient had appt with Dr Villareal today at 1315 to go over the results. Per Dr Villareal patient rescheduled next week on 1/23/24 at 1045, patient agreeable and verbalizes understanding.

## 2024-01-19 LAB
LAB AP ASR DISCLAIMER: NORMAL
LABORATORY COMMENT REPORT: NORMAL
PATH REPORT.COMMENTS IMP SPEC: NORMAL
PATH REPORT.FINAL DX SPEC: NORMAL
PATH REPORT.GROSS SPEC: NORMAL
PATH REPORT.RELEVANT HX SPEC: NORMAL
PATH REPORT.TOTAL CANCER: NORMAL

## 2024-01-22 ENCOUNTER — HOSPITAL ENCOUNTER (OUTPATIENT)
Dept: CARDIOLOGY | Facility: HOSPITAL | Age: 69
Discharge: HOME | End: 2024-01-22
Payer: COMMERCIAL

## 2024-01-22 PROBLEM — U07.1 COVID-19: Status: RESOLVED | Noted: 2023-05-24 | Resolved: 2024-01-22

## 2024-01-22 PROBLEM — M62.9 DISORDER OF MUSCLE: Status: RESOLVED | Noted: 2023-10-30 | Resolved: 2024-01-22

## 2024-01-22 PROBLEM — M54.31 RIGHT SIDED SCIATICA: Status: RESOLVED | Noted: 2023-05-24 | Resolved: 2024-01-22

## 2024-01-22 PROBLEM — I10 HYPERTENSION: Chronic | Status: ACTIVE | Noted: 2023-05-30

## 2024-01-22 PROBLEM — I10 BENIGN ESSENTIAL HYPERTENSION: Chronic | Status: ACTIVE | Noted: 2023-05-24

## 2024-01-22 PROBLEM — S36.039A LACERATION OF SPLEEN: Status: RESOLVED | Noted: 2023-11-13 | Resolved: 2024-01-22

## 2024-01-22 PROBLEM — I48.91 A-FIB (MULTI): Chronic | Status: ACTIVE | Noted: 2023-05-24

## 2024-01-22 PROBLEM — I25.10 CAD (CORONARY ARTERY DISEASE): Chronic | Status: ACTIVE | Noted: 2023-05-24

## 2024-01-22 PROBLEM — C79.51 MALIGNANT NEOPLASM METASTATIC TO BONE (MULTI): Chronic | Status: ACTIVE | Noted: 2023-11-20

## 2024-01-22 PROBLEM — L08.9 SKIN INFECTION: Status: RESOLVED | Noted: 2023-05-24 | Resolved: 2024-01-22

## 2024-01-22 PROBLEM — E78.5 HYPERLIPIDEMIA: Chronic | Status: ACTIVE | Noted: 2023-05-24

## 2024-01-22 LAB
LAB AP ASR DISCLAIMER: NORMAL
LABORATORY COMMENT REPORT: NORMAL
LABORATORY COMMENT REPORT: NORMAL
PATH REPORT.FINAL DX SPEC: NORMAL
PATH REPORT.GROSS SPEC: NORMAL
PATH REPORT.RELEVANT HX SPEC: NORMAL
PATH REPORT.TOTAL CANCER: NORMAL

## 2024-01-22 PROCEDURE — 93005 ELECTROCARDIOGRAM TRACING: CPT

## 2024-01-23 ENCOUNTER — OFFICE VISIT (OUTPATIENT)
Dept: HEMATOLOGY/ONCOLOGY | Facility: CLINIC | Age: 69
End: 2024-01-23
Payer: COMMERCIAL

## 2024-01-23 ENCOUNTER — OFFICE VISIT (OUTPATIENT)
Dept: CARDIOLOGY | Facility: CLINIC | Age: 69
End: 2024-01-23
Payer: COMMERCIAL

## 2024-01-23 VITALS
OXYGEN SATURATION: 96 % | SYSTOLIC BLOOD PRESSURE: 122 MMHG | BODY MASS INDEX: 25.98 KG/M2 | DIASTOLIC BLOOD PRESSURE: 78 MMHG | WEIGHT: 177 LBS | HEART RATE: 86 BPM

## 2024-01-23 VITALS
SYSTOLIC BLOOD PRESSURE: 129 MMHG | HEIGHT: 69 IN | DIASTOLIC BLOOD PRESSURE: 79 MMHG | OXYGEN SATURATION: 98 % | BODY MASS INDEX: 26.42 KG/M2 | WEIGHT: 178.35 LBS | HEART RATE: 88 BPM | RESPIRATION RATE: 18 BRPM | TEMPERATURE: 97.9 F

## 2024-01-23 DIAGNOSIS — C79.51 MALIGNANT NEOPLASM METASTATIC TO BONE (MULTI): ICD-10-CM

## 2024-01-23 DIAGNOSIS — I25.10 CORONARY ARTERY DISEASE INVOLVING NATIVE CORONARY ARTERY OF NATIVE HEART WITHOUT ANGINA PECTORIS: Chronic | ICD-10-CM

## 2024-01-23 DIAGNOSIS — I10 PRIMARY HYPERTENSION: Primary | Chronic | ICD-10-CM

## 2024-01-23 DIAGNOSIS — I48.0 PAROXYSMAL ATRIAL FIBRILLATION (MULTI): Chronic | ICD-10-CM

## 2024-01-23 DIAGNOSIS — I10 BENIGN ESSENTIAL HYPERTENSION: Chronic | ICD-10-CM

## 2024-01-23 DIAGNOSIS — N28.89 LEFT KIDNEY MASS: ICD-10-CM

## 2024-01-23 DIAGNOSIS — E78.2 MIXED HYPERLIPIDEMIA: Chronic | ICD-10-CM

## 2024-01-23 LAB
ATRIAL RATE: 57 BPM
P AXIS: 62 DEGREES
PR INTERVAL: 118 MS
Q ONSET: 254 MS
QRS COUNT: 9 BEATS
QRS DURATION: 86 MS
QT INTERVAL: 415 MS
QTC CALCULATION(BAZETT): 404 MS
QTC FREDERICIA: 408 MS
R AXIS: -14 DEGREES
T AXIS: 62 DEGREES
T OFFSET: 461 MS
VENTRICULAR RATE: 57 BPM

## 2024-01-23 PROCEDURE — 1160F RVW MEDS BY RX/DR IN RCRD: CPT | Performed by: INTERNAL MEDICINE

## 2024-01-23 PROCEDURE — 1036F TOBACCO NON-USER: CPT | Performed by: INTERNAL MEDICINE

## 2024-01-23 PROCEDURE — 3008F BODY MASS INDEX DOCD: CPT | Performed by: INTERNAL MEDICINE

## 2024-01-23 PROCEDURE — 3078F DIAST BP <80 MM HG: CPT | Performed by: INTERNAL MEDICINE

## 2024-01-23 PROCEDURE — 1126F AMNT PAIN NOTED NONE PRSNT: CPT | Performed by: INTERNAL MEDICINE

## 2024-01-23 PROCEDURE — 99215 OFFICE O/P EST HI 40 MIN: CPT | Performed by: INTERNAL MEDICINE

## 2024-01-23 PROCEDURE — 3074F SYST BP LT 130 MM HG: CPT | Performed by: INTERNAL MEDICINE

## 2024-01-23 PROCEDURE — 99214 OFFICE O/P EST MOD 30 MIN: CPT | Performed by: INTERNAL MEDICINE

## 2024-01-23 PROCEDURE — 1159F MED LIST DOCD IN RCRD: CPT | Performed by: INTERNAL MEDICINE

## 2024-01-23 PROCEDURE — 99214 OFFICE O/P EST MOD 30 MIN: CPT | Mod: 25 | Performed by: INTERNAL MEDICINE

## 2024-01-23 RX ORDER — ROSUVASTATIN CALCIUM 10 MG/1
10 TABLET, COATED ORAL DAILY
Qty: 30 TABLET | Refills: 11 | Status: SHIPPED | OUTPATIENT
Start: 2024-01-23 | End: 2025-01-22

## 2024-01-23 ASSESSMENT — PAIN SCALES - GENERAL: PAINLEVEL: 0-NO PAIN

## 2024-01-23 NOTE — PROGRESS NOTES
IgG lambda free light chains 43.16.    IgG lambda M protein 0.1 g/dL in November 2023.    PSA 0.58 NG per mL in November 2023    Prosper Mitchell is a 68 y.o. who presents for Syncope (Pt states he had gone to the bathroom and after that went to go to his room. He states that he felt hot and clammy. Wife states he was grey/white and passed out. Pt lost control of bladder/bowel).  Right sacral bone biopsy on January 11, 2024 revealed bone trabecular and bone marrow with trilineage hematopoiesis no evidence of malignancy.     HPI:  This is a 68yom with history listed below presenting after a syncopal episode at home.   He says he stood up and suddenly felt extremely diaphoretic, nauseated, and lightheaded resulting in a fall.   No reported convulsions however did have incontinence of bowel and bladder.  On arrival, he was hypotensive and bradycardic and was given atropine in the ED with good response.  He was found to have a grade 2 splenic laceration as well as a few other incidental findings on his scans.  Included in these findings was an enhancing L renal lesions as well as lucent lesions involving the sacrum and iliac bone.  Hematology consulted for this.    The patient and his wife had come for follow-up on January 23, 2024 regarding further evaluation of accidentally discovered left kidney mass and possible bone lesions.  The patient was admitted to the hospital after a syncopal episode with possible splenic tear.  Imaging studies revealed possible left renal mass as well as bone lesions.  The patient is asymptomatic.     All other systems were reviewed and are negative unless stated.      Medical history: CAD, HTN, HLD, GERD  Surgical history: none  Family history: HTN  Social history: no smoking     Physical Exam:  Physical Exam:   General appearance: no distress, well-appearing   HEENT: Atraumatic/normocephalic, moist mucosa  Neck: no obvious deformity, JVP WNL  Respiratory: good air movement, appropriate  respiratory effort, no wheezing or crackles  Cardiovascular: regular rate, regular rhythm, no peripheral edema  Abdomen: no organomegaly  Extremities: strong peripheral pulses, no grossly obvious deformities   Skin: intact, no rashes   Neurologic: Alert and oriented x 3, No obvious focal deficit  Psych: appropriate mood & affect, cooperative   All other imaging, labs, and recent documents were reviewed and discussed.     Assessment & Plan:  68-year-old with CAD presenting after a fall at home.  Appear to be vasovagal in nature.  Incidentally found to have left renal nodule on CT scan.   Renal incidentaloma  MRI revealed a 2 cm lesion upper pole of left kidney strongly suggestive of neoplasm.  Bone scan also suggestive of uptake in left ilium suggestive of metastasis.  There are additional areas in the ribs suggestive of post traumatic lesions.  Keeping in mind that the patient had a recent fall at home.  Referred to urology services for further evaluation consider kidney biopsy.  The lesion is too small and less likely to have metastasized.  I have given a benefit of doubt.  If necessary consider a bone biopsy as well.  Left sacral bone lesion biopsy on January 11, 2024 did not reveal any evidence of malignancy.  The patient to follow closely with urology services.    Elevated free kappa light chains/MGUS.    Bone biopsy on January 11, 2024 revealed bone trabeculae and trilineage hematopoiesis without evidence of malignancy.  To be followed clinically.  Return in 3 months.

## 2024-01-23 NOTE — PATIENT INSTRUCTIONS
1. CAD. November 2019 status post CABG LIMA to the LAD SVG to the ramus PDA and diagonal. Doing well. Physically active.  No cardiac symptoms.  Continue with aspirin.  I will switch his atorvastatin to rosuvastatin.  At this point I will continue lisinopril although he and his wife expressed concerns about what they have heard about ACE inhibitor's.  They will let me know if they would like to switch to a different medication.      2. Hyperlipidemia. 5/27/23 LDL 28, HDL 56, Trig 110This will be followed by his primary care doctor.  He did develop myalgias with atorvastatin and pravastatin.  I explained the rationale for using statins.  I will try him on rosuvastatin 10 mg which she will start with every other day.  Lipids and a CMP will be drawn in 3 months.  He will call to get those results.     3. Hypertension well-controlled     4. Palpitations. Minimal to none. He did have a burst of atrial fibrillation postoperatively in 2019. No recurrence.    5.  Preoperative clearance.  He is scheduled for hernia surgery.  His cardiac risk is moderately elevated because of the existence of coronary disease but acceptable.  Hold aspirin for 5 days prior to the surgery.     Also records reviewed.  I reviewed his echocardiogram from the hospital.  Lipids and CMP will be drawn in 3 months as he will start rosuvastatin 10 mg.  They will let me know if they would like to switch to a different antihypertensive.  Return 4 months.

## 2024-01-23 NOTE — PROGRESS NOTES
Referred by No ref. provider found    HPI I am seeing Prosper in hospital follow-up for the last time I seen him in the office was March 2022.  November 2023 admitted to the hospital with abdominal pain, diaphoresis, and syncope.  He was bradycardic upon arrival.  In the hospital he had another episode of near syncope.  Also triggered by abdominal cramping.  Biomarkers normal echo unremarkable.  No recurrence in the last 2 months.  Still with mild abdominal pain which she is having evaluated.  No chest pains no shortness of breath.  Unable to take pravastatin in the past.  He stopped atorvastatin in the hospital because of myalgias.  Myalgias have improved.    Past Medical History:  Problem List Items Addressed This Visit    None       Past Medical History:   Diagnosis Date    CAD (coronary artery disease)     HLD (hyperlipidemia)     HTN (hypertension)              Past Surgical History:  He has a past surgical history that includes Hernia repair (Left, 2004) and Coronary artery bypass graft (11/2019).      Social History:  He reports that he has never smoked. He has never been exposed to tobacco smoke. He has never used smokeless tobacco. He reports that he does not currently use alcohol. He reports that he does not use drugs.    Family History:  Family History   Problem Relation Name Age of Onset    Coronary artery disease Brother          Allergies:  Statins-hmg-coa reductase inhibitors, Ciprofloxacin, Dexamethasone, and Latex    Outpatient Medications:  Current Outpatient Medications   Medication Instructions    ALPRAZolam (Xanax) 0.5 mg tablet Take 1 tablet 1 hour before the procedure    aspirin 81 mg EC tablet 1 tablet, oral, Daily, Not taking    atorvastatin (LIPITOR) 40 mg, oral, Nightly    lisinopril 10 mg, oral, Daily    pravastatin (PRAVACHOL) 20 mg, oral, Daily        Last Recorded Vitals:  There were no vitals filed for this visit.    Physical Exam    Physical  Patient is alert and oriented x3.  HEENT  is unremarkable mucous members are moist  Neck no JVP no bruits upstrokes are full no thyromegaly  Lungs are clear bilaterally.  No wheezing crackles or rales  Heart regular rhythm normal S1-S2 there is no S3 no murmurs are heard.  Abdomen is soft vessels are positive nontender nondistended no organomegaly no pulsatile masses  Extremities have no edema.  Distal pulses present palpable.  Neuro is grossly nonfocal  Skin has no rashes     Last Labs:  CBC -  Lab Results   Component Value Date    WBC 5.9 11/15/2023    HGB 13.7 11/15/2023    HCT 42.3 11/15/2023    MCV 98 11/15/2023     (L) 11/15/2023       CMP -  Lab Results   Component Value Date    CALCIUM 8.8 11/15/2023    PHOS 4.3 11/09/2019    PROT 4.7 (L) 11/14/2023    PROT 5.8 (L) 11/13/2023    ALBUMIN 4.3 11/13/2023    AST 15 11/13/2023    ALT 17 11/13/2023    ALKPHOS 54 11/13/2023    BILITOT 0.6 11/13/2023       LIPID PANEL -   Lab Results   Component Value Date    CHOL 106 05/27/2023    HDL 56.0 05/27/2023    CHHDL 1.9 05/27/2023    VLDL 22 05/27/2023    TRIG 110 05/27/2023    NHDL 81 01/28/2020       RENAL FUNCTION PANEL -   Lab Results   Component Value Date    K 4.4 11/15/2023    PHOS 4.3 11/09/2019       Lab Results   Component Value Date    BNP 61 10/27/2019    HGBA1C 5.0 10/29/2019     Procedure    Echo 11/13/23 EF NL, DDF, Mild - mod LAE    REGULAR STRESS [12/09/2019]: 7 min 24 sec (9.30 METs) ... Normal - Average functional capacity for age. No exercise associated cardiac symptoms. PVC's and ventricular couplets at peak exercise. Negative exercise ECG for inducible ischemia at moderate WL.      HOLTER [11/26/2019]: SR, -65. No ep/of A-fib / PSVT / high-grade AV block. Rare PVCs w/no VT. No arry sx documented.      ECHO [11/06/2019]: EF 55-60%. Abnormal septal motion c/w postop status. Absent A-wave on MV spectral Doppler tracing c/w A-fib.      CABG x4 [11/04/2019, Dr. Briggs]: LEOBARDO-LAD, VG-DIAG, VG-RI, VG-PDA     CATH [10/28/2019]: Severe  3 vessel disease - culprit proximal LAD with ulcerated plaque. Referral for bypass consideration. Medical optimization per primary cardiology team. LVEDP = 10.      CAROTID [10/28/2019]: Less than 50% SENIA / LICA      ECHO [10/28/2019]: EF 65-70%. SD = impaired relaxation pattern of LV diastolic filling. RVSP wnl. Mild aortic valve regurgitation.      CT / SCORING [08/29/2019] = 659.66 (LM 9.17, .19, LCx 1.29, .01)     PHARM NST [09/04/2019]: Normal â€“ 55%         Assessment/Plan      1. CAD. November 2019 status post CABG LIMA to the LAD SVG to the ramus PDA and diagonal. Doing well. Physically active.  No cardiac symptoms.  Continue with aspirin.  I will switch his atorvastatin to rosuvastatin.  At this point I will continue lisinopril although he and his wife expressed concerns about what they have heard about ACE inhibitor's.  They will let me know if they would like to switch to a different medication.      2. Hyperlipidemia. 5/27/23 LDL 28, HDL 56, Trig 110This will be followed by his primary care doctor.  He did develop myalgias with atorvastatin and pravastatin.  I explained the rationale for using statins.  I will try him on rosuvastatin 10 mg which she will start with every other day.  Lipids and a CMP will be drawn in 3 months.  He will call to get those results.     3. Hypertension well-controlled     4. Palpitations. Minimal to none. He did have a burst of atrial fibrillation postoperatively in 2019. No recurrence.    5.  Preoperative clearance.  He is scheduled for hernia surgery.  His cardiac risk is moderately elevated because of the existence of coronary disease but acceptable.  Hold aspirin for 5 days prior to the surgery.     Also records reviewed.  I reviewed his echocardiogram from the hospital.  Lipids and CMP will be drawn in 3 months as he will start rosuvastatin 10 mg.  They will let me know if they would like to switch to a different antihypertensive.  Return 4  months.    Jarad Huang MD     Instructions and follow up

## 2024-02-01 ENCOUNTER — APPOINTMENT (OUTPATIENT)
Dept: RADIOLOGY | Facility: HOSPITAL | Age: 69
End: 2024-02-01
Payer: COMMERCIAL

## 2024-02-01 ENCOUNTER — APPOINTMENT (OUTPATIENT)
Dept: CARDIOLOGY | Facility: HOSPITAL | Age: 69
End: 2024-02-01
Payer: COMMERCIAL

## 2024-02-01 ENCOUNTER — HOSPITAL ENCOUNTER (EMERGENCY)
Facility: HOSPITAL | Age: 69
Discharge: HOME | End: 2024-02-01
Attending: INTERNAL MEDICINE
Payer: COMMERCIAL

## 2024-02-01 VITALS
SYSTOLIC BLOOD PRESSURE: 143 MMHG | WEIGHT: 185 LBS | OXYGEN SATURATION: 95 % | RESPIRATION RATE: 16 BRPM | BODY MASS INDEX: 26.48 KG/M2 | TEMPERATURE: 97.9 F | DIASTOLIC BLOOD PRESSURE: 79 MMHG | HEIGHT: 70 IN | HEART RATE: 67 BPM

## 2024-02-01 DIAGNOSIS — R10.12 LEFT UPPER QUADRANT ABDOMINAL PAIN: Primary | ICD-10-CM

## 2024-02-01 LAB
ALBUMIN SERPL BCP-MCNC: 4.9 G/DL (ref 3.4–5)
ALP SERPL-CCNC: 64 U/L (ref 33–136)
ALT SERPL W P-5'-P-CCNC: 28 U/L (ref 10–52)
ANION GAP SERPL CALC-SCNC: 13 MMOL/L (ref 10–20)
APPEARANCE UR: CLEAR
AST SERPL W P-5'-P-CCNC: 19 U/L (ref 9–39)
BASOPHILS # BLD AUTO: 0.01 X10*3/UL (ref 0–0.1)
BASOPHILS NFR BLD AUTO: 0.1 %
BILIRUB SERPL-MCNC: 0.6 MG/DL (ref 0–1.2)
BILIRUB UR STRIP.AUTO-MCNC: NEGATIVE MG/DL
BUN SERPL-MCNC: 16 MG/DL (ref 6–23)
CALCIUM SERPL-MCNC: 9.7 MG/DL (ref 8.6–10.3)
CHLORIDE SERPL-SCNC: 101 MMOL/L (ref 98–107)
CO2 SERPL-SCNC: 31 MMOL/L (ref 21–32)
COLOR UR: YELLOW
CREAT SERPL-MCNC: 0.87 MG/DL (ref 0.5–1.3)
EGFRCR SERPLBLD CKD-EPI 2021: >90 ML/MIN/1.73M*2
EOSINOPHIL # BLD AUTO: 0.14 X10*3/UL (ref 0–0.7)
EOSINOPHIL NFR BLD AUTO: 2 %
ERYTHROCYTE [DISTWIDTH] IN BLOOD BY AUTOMATED COUNT: 12.5 % (ref 11.5–14.5)
GLUCOSE SERPL-MCNC: 98 MG/DL (ref 74–99)
GLUCOSE UR STRIP.AUTO-MCNC: NEGATIVE MG/DL
HCT VFR BLD AUTO: 48.8 % (ref 41–52)
HGB BLD-MCNC: 16.7 G/DL (ref 13.5–17.5)
IMM GRANULOCYTES # BLD AUTO: 0.02 X10*3/UL (ref 0–0.7)
IMM GRANULOCYTES NFR BLD AUTO: 0.3 % (ref 0–0.9)
KETONES UR STRIP.AUTO-MCNC: NEGATIVE MG/DL
LEUKOCYTE ESTERASE UR QL STRIP.AUTO: NEGATIVE
LIPASE SERPL-CCNC: 35 U/L (ref 9–82)
LYMPHOCYTES # BLD AUTO: 1.51 X10*3/UL (ref 1.2–4.8)
LYMPHOCYTES NFR BLD AUTO: 21.2 %
MAGNESIUM SERPL-MCNC: 2.07 MG/DL (ref 1.6–2.4)
MCH RBC QN AUTO: 32.3 PG (ref 26–34)
MCHC RBC AUTO-ENTMCNC: 34.2 G/DL (ref 32–36)
MCV RBC AUTO: 94 FL (ref 80–100)
MONOCYTES # BLD AUTO: 0.61 X10*3/UL (ref 0.1–1)
MONOCYTES NFR BLD AUTO: 8.6 %
NEUTROPHILS # BLD AUTO: 4.82 X10*3/UL (ref 1.2–7.7)
NEUTROPHILS NFR BLD AUTO: 67.8 %
NITRITE UR QL STRIP.AUTO: NEGATIVE
NRBC BLD-RTO: 0 /100 WBCS (ref 0–0)
PH UR STRIP.AUTO: 5 [PH]
PLATELET # BLD AUTO: 188 X10*3/UL (ref 150–450)
POTASSIUM SERPL-SCNC: 4.8 MMOL/L (ref 3.5–5.3)
PROT SERPL-MCNC: 6.8 G/DL (ref 6.4–8.2)
PROT UR STRIP.AUTO-MCNC: NEGATIVE MG/DL
RBC # BLD AUTO: 5.17 X10*6/UL (ref 4.5–5.9)
RBC # UR STRIP.AUTO: NEGATIVE /UL
SODIUM SERPL-SCNC: 140 MMOL/L (ref 136–145)
SP GR UR STRIP.AUTO: 1.01
UROBILINOGEN UR STRIP.AUTO-MCNC: <2 MG/DL
WBC # BLD AUTO: 7.1 X10*3/UL (ref 4.4–11.3)

## 2024-02-01 PROCEDURE — 83735 ASSAY OF MAGNESIUM: CPT | Performed by: INTERNAL MEDICINE

## 2024-02-01 PROCEDURE — 93005 ELECTROCARDIOGRAM TRACING: CPT

## 2024-02-01 PROCEDURE — 80053 COMPREHEN METABOLIC PANEL: CPT | Performed by: INTERNAL MEDICINE

## 2024-02-01 PROCEDURE — 36415 COLL VENOUS BLD VENIPUNCTURE: CPT | Performed by: INTERNAL MEDICINE

## 2024-02-01 PROCEDURE — 85025 COMPLETE CBC W/AUTO DIFF WBC: CPT | Performed by: INTERNAL MEDICINE

## 2024-02-01 PROCEDURE — 74177 CT ABD & PELVIS W/CONTRAST: CPT | Performed by: RADIOLOGY

## 2024-02-01 PROCEDURE — 81003 URINALYSIS AUTO W/O SCOPE: CPT | Performed by: INTERNAL MEDICINE

## 2024-02-01 PROCEDURE — 83690 ASSAY OF LIPASE: CPT | Performed by: INTERNAL MEDICINE

## 2024-02-01 PROCEDURE — 74177 CT ABD & PELVIS W/CONTRAST: CPT

## 2024-02-01 PROCEDURE — 2550000001 HC RX 255 CONTRASTS: Performed by: INTERNAL MEDICINE

## 2024-02-01 PROCEDURE — 99284 EMERGENCY DEPT VISIT MOD MDM: CPT | Performed by: INTERNAL MEDICINE

## 2024-02-01 PROCEDURE — 99285 EMERGENCY DEPT VISIT HI MDM: CPT | Mod: 25

## 2024-02-01 RX ORDER — DICYCLOMINE HYDROCHLORIDE 20 MG/1
20 TABLET ORAL 3 TIMES DAILY PRN
Qty: 15 TABLET | Refills: 0 | Status: SHIPPED | OUTPATIENT
Start: 2024-02-01 | End: 2024-02-11

## 2024-02-01 RX ORDER — FAMOTIDINE 20 MG/1
20 TABLET, FILM COATED ORAL 2 TIMES DAILY
Qty: 14 TABLET | Refills: 0 | Status: SHIPPED | OUTPATIENT
Start: 2024-02-01 | End: 2024-03-20 | Stop reason: ALTCHOICE

## 2024-02-01 RX ADMIN — IOHEXOL 75 ML: 350 INJECTION, SOLUTION INTRAVENOUS at 19:37

## 2024-02-01 ASSESSMENT — COLUMBIA-SUICIDE SEVERITY RATING SCALE - C-SSRS
1. IN THE PAST MONTH, HAVE YOU WISHED YOU WERE DEAD OR WISHED YOU COULD GO TO SLEEP AND NOT WAKE UP?: NO
2. HAVE YOU ACTUALLY HAD ANY THOUGHTS OF KILLING YOURSELF?: NO
6. HAVE YOU EVER DONE ANYTHING, STARTED TO DO ANYTHING, OR PREPARED TO DO ANYTHING TO END YOUR LIFE?: NO

## 2024-02-01 ASSESSMENT — PAIN SCALES - GENERAL: PAINLEVEL_OUTOF10: 0 - NO PAIN

## 2024-02-01 ASSESSMENT — PAIN - FUNCTIONAL ASSESSMENT: PAIN_FUNCTIONAL_ASSESSMENT: 0-10

## 2024-02-01 NOTE — ED TRIAGE NOTES
Patient arrives from home with complaints of bilat abdominal pain that has been ongoing for couple months.  Patient was here in November for a spleen laceration and is concerned because this feels similar.

## 2024-02-01 NOTE — ED PROVIDER NOTES
HPI   Chief Complaint   Patient presents with    Abdominal Pain     Patient arrives from home with complaints of bilat abdominal pain that has been ongoing for couple months.  Patient was here in November for a spleen laceration and is concerned because this feels similar.       Patient presenting for evaluation of left-sided abdominal pain.  Patient states he feels pain in the left upper area of his abdomen.  Patient notes that he did have a splenic laceration secondary to a fall in November of this year.  Patient states it feels similar to the pain he had with a splenic laceration.  Patient denies recent falls.  Patient denies recent illness.  Denies sore throat.  Patient notes the pain was worse last evening limiting his sleep.  Patient has not take anything for pain.  Denies nausea or vomiting.  Denies diarrhea.      History provided by:  Patient                      No data recorded                  Patient History   Past Medical History:   Diagnosis Date    A-fib (CMS/[facility]) 05/24/2023    Benign essential hypertension 05/24/2023    CAD (coronary artery disease)     HLD (hyperlipidemia)     HTN (hypertension)     Hyperlipidemia 05/24/2023    Hypertension 05/30/2023    Malignant neoplasm metastatic to bone (CMS/[facility]) 11/20/2023     Past Surgical History:   Procedure Laterality Date    CORONARY ARTERY BYPASS GRAFT  11/2019    Dr. Briggs; CABG x4    HERNIA REPAIR Left 2004    Inguinal Hernia repair     Family History   Problem Relation Name Age of Onset    Coronary artery disease Brother       Social History     Tobacco Use    Smoking status: Never     Passive exposure: Never    Smokeless tobacco: Never   Substance Use Topics    Alcohol use: Not Currently    Drug use: Never       Physical Exam   ED Triage Vitals [02/01/24 1442]   Temperature Heart Rate Respirations BP   36.6 °C (97.9 °F) 69 18 (!) 189/86      Pulse Ox Temp Source Heart Rate Source Patient Position   99 % Temporal Monitor Sitting      BP Location  FiO2 (%)     Right arm --       Physical Exam  Vitals and nursing note reviewed.   Constitutional:       Appearance: Normal appearance.   HENT:      Head: Atraumatic.      Right Ear: External ear normal.      Left Ear: External ear normal.      Nose: Nose normal.      Mouth/Throat:      Mouth: Mucous membranes are moist.      Pharynx: Oropharynx is clear.   Eyes:      Extraocular Movements: Extraocular movements intact.      Pupils: Pupils are equal, round, and reactive to light.   Cardiovascular:      Rate and Rhythm: Normal rate and regular rhythm.      Pulses: Normal pulses.   Pulmonary:      Effort: Pulmonary effort is normal.      Breath sounds: Normal breath sounds.   Abdominal:      Palpations: Abdomen is soft.      Tenderness: There is no abdominal tenderness.   Musculoskeletal:         General: No tenderness or signs of injury. Normal range of motion.      Cervical back: Normal range of motion and neck supple. No rigidity or tenderness.   Skin:     General: Skin is warm and dry.   Neurological:      General: No focal deficit present.      Mental Status: He is alert and oriented to person, place, and time. Mental status is at baseline.   Psychiatric:         Mood and Affect: Mood normal.         Behavior: Behavior normal.         ED Course & MDM   ED Course as of 02/03/24 0836   Thu Feb 01, 2024 2040 Updated patient with CT findings including left renal cyst.  Patient indicates has been following with oncology.  Patient agrees to follow-up as outpatient return to ED if having worsening symptoms or other concerns. [JA]      ED Course User Index  [JA] Jose Jose DO         Diagnoses as of 02/03/24 0836   Left upper quadrant abdominal pain       Medical Decision Making  Differential diagnosis: Abdominal pain, colitis, splenic injury, intra-abdominal hemorrhage, renal cyst, MI, other    Patient presenting for evaluation of left-sided abdominal pain.  Patient states he feels pain in the left upper area of  his abdomen.  Patient notes that he did have a splenic laceration secondary to a fall in November of this year.  Patient states it feels similar to the pain he had with a splenic laceration.  Patient denies recent falls.  Patient denies recent illness.  Denies sore throat.  Patient notes the pain was worse last evening limiting his sleep.  Patient has not take anything for pain.  Denies nausea or vomiting.  Denies diarrhea.    EKG appears nonischemic.  Pulses equal.  No known aortic pathology.  CT scan negative for acute process.  No significant leukocytosis or anemia.  No gross electrode abnormality or renal dysfunction.  No infectious source found in the ED.  Renal abnormality noted on CT scan discussed with the patient at length.  Patient states he is followed up with oncology and they are planning reevaluation.  Patient agrees to follow-up as outpatient return to ED if having worsening symptoms or other concerns.        Procedure  ECG 12 lead    Performed by: Jose Jose DO  Authorized by: Jose Jose DO    ECG interpreted by ED Physician in the absence of a cardiologist: yes    Comments:      2/1/2024 17: 15 sinus rhythm, rate 65, normal axis, ST segments normal, T waves normal, motion artifact present, poor R wave progression, nonspecific EKG.  EKG interpreted by myself.       Jose Jose DO  02/03/24 0836

## 2024-02-02 LAB — HOLD SPECIMEN: NORMAL

## 2024-02-03 LAB
ATRIAL RATE: 65 BPM
P AXIS: 44 DEGREES
P OFFSET: 206 MS
P ONSET: 151 MS
PR INTERVAL: 126 MS
Q ONSET: 214 MS
QRS COUNT: 11 BEATS
QRS DURATION: 78 MS
QT INTERVAL: 398 MS
QTC CALCULATION(BAZETT): 413 MS
QTC FREDERICIA: 408 MS
R AXIS: -14 DEGREES
T AXIS: 52 DEGREES
T OFFSET: 413 MS
VENTRICULAR RATE: 65 BPM

## 2024-02-05 RX ORDER — PRAVASTATIN SODIUM 20 MG/1
TABLET ORAL
COMMUNITY
Start: 2023-11-06 | End: 2024-02-23 | Stop reason: ALTCHOICE

## 2024-02-06 ENCOUNTER — PREP FOR PROCEDURE (OUTPATIENT)
Dept: SURGERY | Facility: CLINIC | Age: 69
End: 2024-02-06

## 2024-02-06 ENCOUNTER — OFFICE VISIT (OUTPATIENT)
Dept: SURGERY | Facility: CLINIC | Age: 69
End: 2024-02-06
Payer: COMMERCIAL

## 2024-02-06 VITALS
HEIGHT: 70 IN | RESPIRATION RATE: 16 BRPM | HEART RATE: 69 BPM | BODY MASS INDEX: 25.65 KG/M2 | WEIGHT: 179.2 LBS | OXYGEN SATURATION: 99 % | DIASTOLIC BLOOD PRESSURE: 84 MMHG | SYSTOLIC BLOOD PRESSURE: 147 MMHG | TEMPERATURE: 97.4 F

## 2024-02-06 DIAGNOSIS — Z01.818 PRE-OP EXAMINATION: ICD-10-CM

## 2024-02-06 DIAGNOSIS — K40.90 RIGHT INGUINAL HERNIA: ICD-10-CM

## 2024-02-06 PROCEDURE — 3008F BODY MASS INDEX DOCD: CPT | Performed by: SURGERY

## 2024-02-06 PROCEDURE — 3079F DIAST BP 80-89 MM HG: CPT | Performed by: SURGERY

## 2024-02-06 PROCEDURE — 1126F AMNT PAIN NOTED NONE PRSNT: CPT | Performed by: SURGERY

## 2024-02-06 PROCEDURE — 1160F RVW MEDS BY RX/DR IN RCRD: CPT | Performed by: SURGERY

## 2024-02-06 PROCEDURE — 3077F SYST BP >= 140 MM HG: CPT | Performed by: SURGERY

## 2024-02-06 PROCEDURE — 99212 OFFICE O/P EST SF 10 MIN: CPT | Performed by: SURGERY

## 2024-02-06 PROCEDURE — 1036F TOBACCO NON-USER: CPT | Performed by: SURGERY

## 2024-02-06 PROCEDURE — 1159F MED LIST DOCD IN RCRD: CPT | Performed by: SURGERY

## 2024-02-06 ASSESSMENT — ENCOUNTER SYMPTOMS
RESPIRATORY NEGATIVE: 1
CHILLS: 1
ACTIVITY CHANGE: 1
ABDOMINAL PAIN: 1
EYES NEGATIVE: 1
CARDIOVASCULAR NEGATIVE: 1

## 2024-02-06 ASSESSMENT — PAIN SCALES - GENERAL: PAINLEVEL: 0-NO PAIN

## 2024-02-06 NOTE — H&P (VIEW-ONLY)
Subjective   Patient ID: Prosper Mitchell is a 68 y.o. male who presents for Pre-op Exam (Right inguinal hernia).  HPI  Patient has a past medical history significant for HTN, HLD, palpitations, and CAD s/p CABG (November 2019) who presented to [facility] ED on 11/13 for syncope. Patient developed abdominal pain the evening prior to ED presentation. Unable to fully describe the characteristics of pain, only that it made it difficult for him to sleep last night. He got out of bed just before 4am to use the bathroom. Recalls having a normal BM. When walking back to bedroom, patient began to feel very unwell: clammy, hot, and lightheaded. Patient's wife recalls that her  walked loudly into the bedroom door waking her up. She asked him what was wrong, but he provided no verbal answer. She tried to help him into bed, but patient ultimately fell/slumped to the floor. Wife states that he did not hit anything during fall. He did have bowel/bladder incontinence. No seizure activity witnessed. Wife called EMS and patient was brought to [facility] ED. No report of loss of pulse or apnea.      In ED patient developed symptomatic bradycardia to 40s requiring 1mg IV Atropine. Patient states he felt similar to when he passed out early this morning; lightheaded and dizzy along with clammy/hot. 12 lead EKG did not demonstrate any acute ST elevation, but did have ST depressions and patient received 325mg Aspirin. CTA C/A/P ordered to rule out dissection with new chest pain associated with bradycardia. This scan was negative for any aortic pathology, but did demonstrate Grade II Liver laceration with free fluid in abdomen. Incidental finding of 2x2cm enhancing lesion in left kidney.      Pt was admitted to ICU under trauma service. Syncope workup unremarkable, and vitals remained stable with HR in 50s-60s. Hgb stable and abd pain improved. Pt was seen by cardiology, urology, and oncology while admitted. Pt to undergo outpatient workup  of renal mass with urology and oncology. Cardiology did not recommend event monitor, pt to follow-up in 6-8 weeks.     Returned to clinic 11/28/2023. Having some left flank pain. Gets cold easily. Awaiting PET and MRI of Abdomen. Will follow-up with oncology. No fatigue or syncope.    Saw oncology on 11/20/2023 and 12/19/2023. PET on 12/01/2023 and MRI 12/06/2023. Plans for IR biopsy of bone lesion of left posterior pelvic region. Oncology doubts mets simply due to the size of the left kidney lesion. Has cardiology appt at end of January and IR biopsy in mid January.    Returned to office 01/04/2024. Has right inguinal pain and direct inguinal hernia on CT ABD PEL 11/13/2023. Prior right open repair with tacks seen on imaging. Small recurrence seems likely. Some right lower quadrant pain too with known appendicoliths.    Returned to office 02/06/2024 ready for surgery. Recently was in ED with abdominal pain. Concern for chronic pancreatitis. Will look at gallbladder during surgery. Urology holding off on resection of renal lesion, surveillance. IR biopsy of right sacrum bone without tumor.     Review of Systems   Constitutional:  Positive for activity change and chills.   HENT: Negative.     Eyes: Negative.    Respiratory: Negative.     Cardiovascular: Negative.    Gastrointestinal:  Positive for abdominal pain.   Endocrine: Positive for cold intolerance.   Genitourinary: Negative.        Objective   Physical Exam  Constitutional:       Appearance: Normal appearance.   HENT:      Head: Atraumatic.      Nose: Nose normal.      Mouth/Throat:      Mouth: Mucous membranes are moist.   Cardiovascular:      Rate and Rhythm: Normal rate and regular rhythm.   Pulmonary:      Effort: Pulmonary effort is normal.      Breath sounds: Normal breath sounds.   Abdominal:      General: There is no distension.      Palpations: Abdomen is soft.      Tenderness: There is no guarding or rebound.      Hernia: A hernia is present.       Comments: Direct right inguinal hernia with valsalva.   Skin:     General: Skin is warm.   Neurological:      Mental Status: He is alert. Mental status is at baseline.   Psychiatric:         Mood and Affect: Mood normal.         Thought Content: Thought content normal.         Judgment: Judgment normal.         Assessment/Plan   Problem List Items Addressed This Visit             ICD-10-CM       Gastrointestinal and Abdominal    Right inguinal hernia K40.90     Consented for repair. Photo graph gallbladder at time of surgery.          Other Visit Diagnoses         Codes    Pre-op examination     Z01.818

## 2024-02-06 NOTE — PROGRESS NOTES
Subjective   Patient ID: Prosper Mitchell is a 68 y.o. male who presents for Pre-op Exam (Right inguinal hernia).  HPI  Patient has a past medical history significant for HTN, HLD, palpitations, and CAD s/p CABG (November 2019) who presented to [facility] ED on 11/13 for syncope. Patient developed abdominal pain the evening prior to ED presentation. Unable to fully describe the characteristics of pain, only that it made it difficult for him to sleep last night. He got out of bed just before 4am to use the bathroom. Recalls having a normal BM. When walking back to bedroom, patient began to feel very unwell: clammy, hot, and lightheaded. Patient's wife recalls that her  walked loudly into the bedroom door waking her up. She asked him what was wrong, but he provided no verbal answer. She tried to help him into bed, but patient ultimately fell/slumped to the floor. Wife states that he did not hit anything during fall. He did have bowel/bladder incontinence. No seizure activity witnessed. Wife called EMS and patient was brought to [facility] ED. No report of loss of pulse or apnea.      In ED patient developed symptomatic bradycardia to 40s requiring 1mg IV Atropine. Patient states he felt similar to when he passed out early this morning; lightheaded and dizzy along with clammy/hot. 12 lead EKG did not demonstrate any acute ST elevation, but did have ST depressions and patient received 325mg Aspirin. CTA C/A/P ordered to rule out dissection with new chest pain associated with bradycardia. This scan was negative for any aortic pathology, but did demonstrate Grade II Liver laceration with free fluid in abdomen. Incidental finding of 2x2cm enhancing lesion in left kidney.      Pt was admitted to ICU under trauma service. Syncope workup unremarkable, and vitals remained stable with HR in 50s-60s. Hgb stable and abd pain improved. Pt was seen by cardiology, urology, and oncology while admitted. Pt to undergo outpatient workup  of renal mass with urology and oncology. Cardiology did not recommend event monitor, pt to follow-up in 6-8 weeks.     Returned to clinic 11/28/2023. Having some left flank pain. Gets cold easily. Awaiting PET and MRI of Abdomen. Will follow-up with oncology. No fatigue or syncope.    Saw oncology on 11/20/2023 and 12/19/2023. PET on 12/01/2023 and MRI 12/06/2023. Plans for IR biopsy of bone lesion of left posterior pelvic region. Oncology doubts mets simply due to the size of the left kidney lesion. Has cardiology appt at end of January and IR biopsy in mid January.    Returned to office 01/04/2024. Has right inguinal pain and direct inguinal hernia on CT ABD PEL 11/13/2023. Prior right open repair with tacks seen on imaging. Small recurrence seems likely. Some right lower quadrant pain too with known appendicoliths.    Returned to office 02/06/2024 ready for surgery. Recently was in ED with abdominal pain. Concern for chronic pancreatitis. Will look at gallbladder during surgery. Urology holding off on resection of renal lesion, surveillance. IR biopsy of right sacrum bone without tumor.     Review of Systems   Constitutional:  Positive for activity change and chills.   HENT: Negative.     Eyes: Negative.    Respiratory: Negative.     Cardiovascular: Negative.    Gastrointestinal:  Positive for abdominal pain.   Endocrine: Positive for cold intolerance.   Genitourinary: Negative.        Objective   Physical Exam  Constitutional:       Appearance: Normal appearance.   HENT:      Head: Atraumatic.      Nose: Nose normal.      Mouth/Throat:      Mouth: Mucous membranes are moist.   Cardiovascular:      Rate and Rhythm: Normal rate and regular rhythm.   Pulmonary:      Effort: Pulmonary effort is normal.      Breath sounds: Normal breath sounds.   Abdominal:      General: There is no distension.      Palpations: Abdomen is soft.      Tenderness: There is no guarding or rebound.      Hernia: A hernia is present.       Comments: Direct right inguinal hernia with valsalva.   Skin:     General: Skin is warm.   Neurological:      Mental Status: He is alert. Mental status is at baseline.   Psychiatric:         Mood and Affect: Mood normal.         Thought Content: Thought content normal.         Judgment: Judgment normal.         Assessment/Plan   Problem List Items Addressed This Visit             ICD-10-CM       Gastrointestinal and Abdominal    Right inguinal hernia K40.90     Consented for repair. Photo graph gallbladder at time of surgery.          Other Visit Diagnoses         Codes    Pre-op examination     Z01.818

## 2024-02-22 ENCOUNTER — APPOINTMENT (OUTPATIENT)
Dept: SURGERY | Facility: CLINIC | Age: 69
End: 2024-02-22
Payer: COMMERCIAL

## 2024-02-23 NOTE — PREPROCEDURE INSTRUCTIONS
Current Medications   Medication Instructions    lisinopril 10 mg tablet Continue until night before surgery    rosuvastatin (Crestor) 10 mg tablet Continue until night before surgery            NPO Instructions:    Do not eat any food after midnight the night before your surgery/procedure.    Additional Instructions:     Review your medication instructions, take indicated medications    Arrive in registration at 8:30 am for 10:00 am surgery.    Enter through main entrance of [facility] building, located at [address]. Proceed to registration, located in the back right hand corner. You will need your ID and insurance card for registration. Please ensure you have a responsible adult to drive you home.     Shower the morning of or night before your procedure. After you shower avoid lotions, powders, deodorants or anything applied to the skin. If you wear contacts or glasses, wear the glasses. If you do not have glasses, please bring a case for your contacts. You may wear hearing aids and dentures, bring a case for them or we will provide one. Make sure you wear something loose and comfortable. Keep in mind your surgery type and wear something that will accommodate incisions or bandages. Please remove all jewelry.     Nothing to eat or drink after midnight (including coffee, tea, gum etc). You may take medications discussed during phone call with a small sip of water.    For further questions Jaime KAUR can be contacted at [phone] between 7AM-3PM.

## 2024-02-26 ENCOUNTER — ANESTHESIA (OUTPATIENT)
Dept: OPERATING ROOM | Facility: HOSPITAL | Age: 69
End: 2024-02-26
Payer: COMMERCIAL

## 2024-02-26 ENCOUNTER — HOSPITAL ENCOUNTER (OUTPATIENT)
Facility: HOSPITAL | Age: 69
Setting detail: OUTPATIENT SURGERY
Discharge: HOME | End: 2024-02-26
Attending: SURGERY | Admitting: SURGERY
Payer: COMMERCIAL

## 2024-02-26 ENCOUNTER — ANESTHESIA EVENT (OUTPATIENT)
Dept: OPERATING ROOM | Facility: HOSPITAL | Age: 69
End: 2024-02-26
Payer: COMMERCIAL

## 2024-02-26 VITALS
TEMPERATURE: 98.4 F | BODY MASS INDEX: 25.66 KG/M2 | DIASTOLIC BLOOD PRESSURE: 61 MMHG | SYSTOLIC BLOOD PRESSURE: 117 MMHG | HEART RATE: 51 BPM | RESPIRATION RATE: 16 BRPM | OXYGEN SATURATION: 100 % | HEIGHT: 70 IN | WEIGHT: 179.23 LBS

## 2024-02-26 DIAGNOSIS — K40.20 NON-RECURRENT BILATERAL INGUINAL HERNIA WITHOUT OBSTRUCTION OR GANGRENE: Primary | ICD-10-CM

## 2024-02-26 LAB
ABO GROUP (TYPE) IN BLOOD: NORMAL
ABO GROUP (TYPE) IN BLOOD: NORMAL
ANTIBODY SCREEN: NORMAL
RH FACTOR (ANTIGEN D): NORMAL
RH FACTOR (ANTIGEN D): NORMAL

## 2024-02-26 PROCEDURE — 2720000007 HC OR 272 NO HCPCS: Performed by: SURGERY

## 2024-02-26 PROCEDURE — A49650 PR LAP,INGUINAL HERNIA REPR,INITIAL

## 2024-02-26 PROCEDURE — 2500000004 HC RX 250 GENERAL PHARMACY W/ HCPCS (ALT 636 FOR OP/ED): Performed by: ANESTHESIOLOGY

## 2024-02-26 PROCEDURE — 2500000005 HC RX 250 GENERAL PHARMACY W/O HCPCS: Performed by: SURGERY

## 2024-02-26 PROCEDURE — 3600000009 HC OR TIME - EACH INCREMENTAL 1 MINUTE - PROCEDURE LEVEL FOUR: Performed by: SURGERY

## 2024-02-26 PROCEDURE — 49650 LAP ING HERNIA REPAIR INIT: CPT | Performed by: SURGERY

## 2024-02-26 PROCEDURE — 3700000002 HC GENERAL ANESTHESIA TIME - EACH INCREMENTAL 1 MINUTE: Performed by: SURGERY

## 2024-02-26 PROCEDURE — 7100000010 HC PHASE TWO TIME - EACH INCREMENTAL 1 MINUTE: Performed by: SURGERY

## 2024-02-26 PROCEDURE — A4217 STERILE WATER/SALINE, 500 ML: HCPCS | Performed by: SURGERY

## 2024-02-26 PROCEDURE — 3700000001 HC GENERAL ANESTHESIA TIME - INITIAL BASE CHARGE: Performed by: SURGERY

## 2024-02-26 PROCEDURE — A49650 PR LAP,INGUINAL HERNIA REPR,INITIAL: Performed by: ANESTHESIOLOGY

## 2024-02-26 PROCEDURE — 7100000009 HC PHASE TWO TIME - INITIAL BASE CHARGE: Performed by: SURGERY

## 2024-02-26 PROCEDURE — 36415 COLL VENOUS BLD VENIPUNCTURE: CPT | Performed by: SURGERY

## 2024-02-26 PROCEDURE — 7100000002 HC RECOVERY ROOM TIME - EACH INCREMENTAL 1 MINUTE: Performed by: SURGERY

## 2024-02-26 PROCEDURE — 2500000005 HC RX 250 GENERAL PHARMACY W/O HCPCS

## 2024-02-26 PROCEDURE — 7100000001 HC RECOVERY ROOM TIME - INITIAL BASE CHARGE: Performed by: SURGERY

## 2024-02-26 PROCEDURE — 2500000004 HC RX 250 GENERAL PHARMACY W/ HCPCS (ALT 636 FOR OP/ED): Performed by: SURGERY

## 2024-02-26 PROCEDURE — 2780000003 HC OR 278 NO HCPCS: Performed by: SURGERY

## 2024-02-26 PROCEDURE — 2500000001 HC RX 250 WO HCPCS SELF ADMINISTERED DRUGS (ALT 637 FOR MEDICARE OP): Performed by: SURGERY

## 2024-02-26 PROCEDURE — 2500000004 HC RX 250 GENERAL PHARMACY W/ HCPCS (ALT 636 FOR OP/ED)

## 2024-02-26 PROCEDURE — 88304 TISSUE EXAM BY PATHOLOGIST: CPT | Performed by: PATHOLOGY

## 2024-02-26 PROCEDURE — 3600000004 HC OR TIME - INITIAL BASE CHARGE - PROCEDURE LEVEL FOUR: Performed by: SURGERY

## 2024-02-26 PROCEDURE — 88304 TISSUE EXAM BY PATHOLOGIST: CPT | Mod: TC,PARLAB | Performed by: SURGERY

## 2024-02-26 PROCEDURE — 86901 BLOOD TYPING SEROLOGIC RH(D): CPT | Performed by: SURGERY

## 2024-02-26 PROCEDURE — C1781 MESH (IMPLANTABLE): HCPCS | Performed by: SURGERY

## 2024-02-26 DEVICE — LAPAROSCOPIC SELF-FIXATING MESH POLYESTER WITH POLYLACTIC ACID GRIPS AND COLLAGEN FILM
Type: IMPLANTABLE DEVICE | Site: GROIN | Status: FUNCTIONAL
Brand: PROGRIP

## 2024-02-26 RX ORDER — SODIUM CHLORIDE, SODIUM LACTATE, POTASSIUM CHLORIDE, CALCIUM CHLORIDE 600; 310; 30; 20 MG/100ML; MG/100ML; MG/100ML; MG/100ML
100 INJECTION, SOLUTION INTRAVENOUS CONTINUOUS
Status: DISCONTINUED | OUTPATIENT
Start: 2024-02-26 | End: 2024-02-26 | Stop reason: HOSPADM

## 2024-02-26 RX ORDER — ONDANSETRON HYDROCHLORIDE 2 MG/ML
4 INJECTION, SOLUTION INTRAVENOUS ONCE AS NEEDED
Status: DISCONTINUED | OUTPATIENT
Start: 2024-02-26 | End: 2024-02-26 | Stop reason: HOSPADM

## 2024-02-26 RX ORDER — ACETAMINOPHEN 325 MG/1
650 TABLET ORAL EVERY 4 HOURS PRN
Status: DISCONTINUED | OUTPATIENT
Start: 2024-02-26 | End: 2024-02-26 | Stop reason: HOSPADM

## 2024-02-26 RX ORDER — DEXAMETHASONE SODIUM PHOSPHATE 10 MG/ML
6 INJECTION INTRAMUSCULAR; INTRAVENOUS ONCE
Status: DISCONTINUED | OUTPATIENT
Start: 2024-02-26 | End: 2024-02-26

## 2024-02-26 RX ORDER — HEPARIN SODIUM 5000 [USP'U]/ML
5000 INJECTION, SOLUTION INTRAVENOUS; SUBCUTANEOUS ONCE
Status: COMPLETED | OUTPATIENT
Start: 2024-02-26 | End: 2024-02-26

## 2024-02-26 RX ORDER — WATER 1 ML/ML
IRRIGANT IRRIGATION AS NEEDED
Status: DISCONTINUED | OUTPATIENT
Start: 2024-02-26 | End: 2024-02-26 | Stop reason: HOSPADM

## 2024-02-26 RX ORDER — IPRATROPIUM BROMIDE 0.5 MG/2.5ML
500 SOLUTION RESPIRATORY (INHALATION) ONCE
Status: DISCONTINUED | OUTPATIENT
Start: 2024-02-26 | End: 2024-02-26 | Stop reason: HOSPADM

## 2024-02-26 RX ORDER — ONDANSETRON HYDROCHLORIDE 2 MG/ML
INJECTION, SOLUTION INTRAVENOUS AS NEEDED
Status: DISCONTINUED | OUTPATIENT
Start: 2024-02-26 | End: 2024-02-26

## 2024-02-26 RX ORDER — FENTANYL CITRATE 50 UG/ML
INJECTION, SOLUTION INTRAMUSCULAR; INTRAVENOUS AS NEEDED
Status: DISCONTINUED | OUTPATIENT
Start: 2024-02-26 | End: 2024-02-26

## 2024-02-26 RX ORDER — LIDOCAINE HCL/PF 100 MG/5ML
SYRINGE (ML) INTRAVENOUS AS NEEDED
Status: DISCONTINUED | OUTPATIENT
Start: 2024-02-26 | End: 2024-02-26

## 2024-02-26 RX ORDER — PROPOFOL 10 MG/ML
INJECTION, EMULSION INTRAVENOUS AS NEEDED
Status: DISCONTINUED | OUTPATIENT
Start: 2024-02-26 | End: 2024-02-26

## 2024-02-26 RX ORDER — BUPIVACAINE HYDROCHLORIDE 5 MG/ML
INJECTION, SOLUTION PERINEURAL AS NEEDED
Status: DISCONTINUED | OUTPATIENT
Start: 2024-02-26 | End: 2024-02-26 | Stop reason: HOSPADM

## 2024-02-26 RX ORDER — CELECOXIB 100 MG/1
400 CAPSULE ORAL ONCE
Status: COMPLETED | OUTPATIENT
Start: 2024-02-26 | End: 2024-02-26

## 2024-02-26 RX ORDER — MIDAZOLAM HYDROCHLORIDE 1 MG/ML
INJECTION, SOLUTION INTRAMUSCULAR; INTRAVENOUS AS NEEDED
Status: DISCONTINUED | OUTPATIENT
Start: 2024-02-26 | End: 2024-02-26

## 2024-02-26 RX ORDER — SCOLOPAMINE TRANSDERMAL SYSTEM 1 MG/1
1 PATCH, EXTENDED RELEASE TRANSDERMAL
Status: DISCONTINUED | OUTPATIENT
Start: 2024-02-26 | End: 2024-02-26 | Stop reason: HOSPADM

## 2024-02-26 RX ORDER — KETOROLAC TROMETHAMINE 30 MG/ML
15 INJECTION, SOLUTION INTRAMUSCULAR; INTRAVENOUS ONCE AS NEEDED
Status: DISCONTINUED | OUTPATIENT
Start: 2024-02-26 | End: 2024-02-26 | Stop reason: HOSPADM

## 2024-02-26 RX ORDER — LIDOCAINE HYDROCHLORIDE 10 MG/ML
0.1 INJECTION INFILTRATION; PERINEURAL ONCE
Status: DISCONTINUED | OUTPATIENT
Start: 2024-02-26 | End: 2024-02-26 | Stop reason: HOSPADM

## 2024-02-26 RX ORDER — MEPERIDINE HYDROCHLORIDE 25 MG/ML
12.5 INJECTION INTRAMUSCULAR; INTRAVENOUS; SUBCUTANEOUS EVERY 10 MIN PRN
Status: DISCONTINUED | OUTPATIENT
Start: 2024-02-26 | End: 2024-02-26 | Stop reason: HOSPADM

## 2024-02-26 RX ORDER — CEFAZOLIN SODIUM 2 G/100ML
2 INJECTION, SOLUTION INTRAVENOUS ONCE
Status: COMPLETED | OUTPATIENT
Start: 2024-02-26 | End: 2024-02-26

## 2024-02-26 RX ORDER — ACETAMINOPHEN 325 MG/1
975 TABLET ORAL ONCE
Status: COMPLETED | OUTPATIENT
Start: 2024-02-26 | End: 2024-02-26

## 2024-02-26 RX ORDER — ROCURONIUM BROMIDE 10 MG/ML
INJECTION, SOLUTION INTRAVENOUS AS NEEDED
Status: DISCONTINUED | OUTPATIENT
Start: 2024-02-26 | End: 2024-02-26

## 2024-02-26 RX ORDER — OXYCODONE HYDROCHLORIDE 5 MG/1
5 TABLET ORAL EVERY 6 HOURS PRN
Qty: 12 TABLET | Refills: 0 | Status: SHIPPED | OUTPATIENT
Start: 2024-02-26 | End: 2024-03-04

## 2024-02-26 RX ORDER — ALBUTEROL SULFATE 0.83 MG/ML
2.5 SOLUTION RESPIRATORY (INHALATION) ONCE AS NEEDED
Status: DISCONTINUED | OUTPATIENT
Start: 2024-02-26 | End: 2024-02-26 | Stop reason: HOSPADM

## 2024-02-26 RX ORDER — GABAPENTIN 300 MG/1
300 CAPSULE ORAL ONCE
Status: COMPLETED | OUTPATIENT
Start: 2024-02-26 | End: 2024-02-26

## 2024-02-26 RX ADMIN — PROPOFOL 50 MG: 10 INJECTION, EMULSION INTRAVENOUS at 10:48

## 2024-02-26 RX ADMIN — FENTANYL CITRATE 100 MCG: 50 INJECTION, SOLUTION INTRAMUSCULAR; INTRAVENOUS at 10:36

## 2024-02-26 RX ADMIN — SODIUM CHLORIDE, POTASSIUM CHLORIDE, SODIUM LACTATE AND CALCIUM CHLORIDE 100 ML/HR: 600; 310; 30; 20 INJECTION, SOLUTION INTRAVENOUS at 12:48

## 2024-02-26 RX ADMIN — CELECOXIB 400 MG: 100 CAPSULE ORAL at 08:49

## 2024-02-26 RX ADMIN — CEFAZOLIN SODIUM 2 G: 2 INJECTION, SOLUTION INTRAVENOUS at 10:41

## 2024-02-26 RX ADMIN — ROCURONIUM BROMIDE 50 MG: 10 INJECTION, SOLUTION INTRAVENOUS at 10:36

## 2024-02-26 RX ADMIN — HEPARIN SODIUM 5000 UNITS: 5000 INJECTION INTRAVENOUS; SUBCUTANEOUS at 08:48

## 2024-02-26 RX ADMIN — SUGAMMADEX 200 MG: 100 INJECTION, SOLUTION INTRAVENOUS at 11:33

## 2024-02-26 RX ADMIN — MIDAZOLAM 2 MG: 1 INJECTION INTRAMUSCULAR; INTRAVENOUS at 10:34

## 2024-02-26 RX ADMIN — FENTANYL CITRATE 50 MCG: 50 INJECTION, SOLUTION INTRAMUSCULAR; INTRAVENOUS at 10:55

## 2024-02-26 RX ADMIN — ACETAMINOPHEN 650 MG: 325 TABLET ORAL at 14:04

## 2024-02-26 RX ADMIN — FENTANYL CITRATE 50 MCG: 50 INJECTION, SOLUTION INTRAMUSCULAR; INTRAVENOUS at 11:16

## 2024-02-26 RX ADMIN — SODIUM CHLORIDE, SODIUM LACTATE, POTASSIUM CHLORIDE, AND CALCIUM CHLORIDE: 600; 310; 30; 20 INJECTION, SOLUTION INTRAVENOUS at 10:29

## 2024-02-26 RX ADMIN — LIDOCAINE HYDROCHLORIDE 100 MG: 20 INJECTION INTRAVENOUS at 10:36

## 2024-02-26 RX ADMIN — PROPOFOL 150 MG: 10 INJECTION, EMULSION INTRAVENOUS at 10:36

## 2024-02-26 RX ADMIN — ONDANSETRON 4 MG: 2 INJECTION INTRAMUSCULAR; INTRAVENOUS at 11:31

## 2024-02-26 RX ADMIN — SCOPOLAMINE 1 PATCH: 1.5 PATCH, EXTENDED RELEASE TRANSDERMAL at 08:48

## 2024-02-26 RX ADMIN — ACETAMINOPHEN 975 MG: 325 TABLET ORAL at 08:48

## 2024-02-26 RX ADMIN — GABAPENTIN 300 MG: 300 CAPSULE ORAL at 08:49

## 2024-02-26 SDOH — HEALTH STABILITY: MENTAL HEALTH: CURRENT SMOKER: 0

## 2024-02-26 ASSESSMENT — PAIN - FUNCTIONAL ASSESSMENT
PAIN_FUNCTIONAL_ASSESSMENT: 0-10

## 2024-02-26 ASSESSMENT — PAIN SCALES - GENERAL
PAIN_LEVEL: 0
PAINLEVEL_OUTOF10: 0 - NO PAIN
PAINLEVEL_OUTOF10: 1
PAINLEVEL_OUTOF10: 0 - NO PAIN
PAINLEVEL_OUTOF10: 2
PAINLEVEL_OUTOF10: 0 - NO PAIN

## 2024-02-26 ASSESSMENT — PAIN DESCRIPTION - LOCATION: LOCATION: ABDOMEN

## 2024-02-26 NOTE — H&P
History Of Present Illness  Prosper Mitchell is a 68 y.o. male with prior CABG and open LIHR, presenting with symptomatic right inguinal hernia; presenting for elective robotic assisted repair.      Past Medical History  Past Medical History:   Diagnosis Date    A-fib (CMS/[facility]) 05/24/2023    Benign essential hypertension 05/24/2023    CAD (coronary artery disease)     HLD (hyperlipidemia)     HTN (hypertension)     Hyperlipidemia 05/24/2023    Hypertension 05/30/2023    Malignant neoplasm metastatic to bone (CMS/[facility]) 11/20/2023       Surgical History  Past Surgical History:   Procedure Laterality Date    CORONARY ARTERY BYPASS GRAFT  11/2019    Dr. Briggs; CABG x4    HERNIA REPAIR Left 2004    Inguinal Hernia repair        Social History  He reports that he has never smoked. He has never been exposed to tobacco smoke. He has never used smokeless tobacco. He reports that he does not currently use alcohol. He reports that he does not use drugs.    Family History  Family History   Problem Relation Name Age of Onset    Coronary artery disease Brother          Allergies  Statins-hmg-coa reductase inhibitors, Ciprofloxacin, Dexamethasone, and Latex    Review of Systems   Negative unless otherwise specified in HPI.     Physical Exam  Constitutional: no acute distress  Neuro: A/O x4, no gross deficits   Psych: normal affect  HEENT: No deformities, no scleral icterus   Cardiac: RRR  Pulmonary: unlabored respirations   Abdomen: soft, non distended, non tender  Skin: warm and dry overall    Extremities: no swelling noted  MSK: moving all four  Groin: Inguinal hernia noted, prior incision   Last Recorded Vitals  Weight 81.3 kg (179 lb 3.7 oz).    Relevant Results        ECG 12 lead    Result Date: 2/3/2024  Normal sinus rhythm with sinus arrhythmia Cannot rule out Anterior infarct , age undetermined Abnormal ECG No previous ECGs available See ED provider note for full interpretation and clinical correlation Confirmed by  Roderickjessica Brianna ([id number]) on 2/3/2024 10:28:54 AM    CT abdomen pelvis w IV contrast    Result Date: 2/1/2024  Interpreted By:  Ifeoma Norman, STUDY: CT ABDOMEN PELVIS W IV CONTRAST;  2/1/2024 7:36 pm   INDICATION: Signs/Symptoms:LUQ pain.   COMPARISON: CT scan of the chest, abdomen pelvis 11/13/2023   ACCESSION NUMBER(S): [id number]   ORDERING CLINICIAN: THAI MARIE   TECHNIQUE: Axial CT images of the abdomen and pelvis with coronal and sagittal reconstructed images obtained after intravenous administration of 75 mL of Omnipaque 350.   FINDINGS: LOWER CHEST: Bibasilar atelectasis. Coronary artery calcifications noted. Postsurgical changes of CABG with midline sternotomy wires present.   ABDOMEN:   LIVER: Within normal limits. BILE DUCTS: Normal caliber. GALLBLADDER: No calcified gallstones. No wall thickening. PANCREAS: Calcifications in the pancreatic head may relate to sequela of chronic pancreatitis. No acute peripancreatic inflammatory changes or fluid is seen. SPLEEN: Within normal limits. ADRENALS: Within normal limits. KIDNEYS and URETERS: Symmetric renal enhancement. No hydronephrosis or perinephric fluid collection. There is a 2.2 cm complex thick wall hypodense lesion in the upper pole of the left kidney which was previously characterized by MRI as a enhancing renal neoplasm. Additional subcentimeter hypodense foci are too small to characterize. Mild bilateral perinephric stranding is nonspecific and could be age related.   VESSELS:  Calcific atherosclerosis of the aortoiliac and mesenteric vessels. No aortic aneurysm. RETROPERITONEUM: No pathologically enlarged retroperitoneal lymph nodes.   PELVIS:   REPRODUCTIVE ORGANS: Prostate gland is mildly enlarged measuring 5.1 cm in transverse dimension. BLADDER: Within normal limits.   BOWEL: Stomach is under distended with apparent wall thickening. Visualized loops of bowel are without evidence for obstruction. Fecalization of the distal small bowel  suggest delayed transit. Note is made of colonic interposition. Normal appendix. PERITONEUM: No ascites or free air, no fluid collection.   ABDOMINAL WALL: Postsurgical changes of left inguinal herniorrhaphy. Patulous right inguinal canal contains fat. BONES: Multilevel degenerative changes of the spine. Bilateral L5 pars defect with grade 1 anterolisthesis of L5 on S1. grade 1 retrolisthesis of L4 on 5. There are several subcentimeter lucent foci in bilateral iliac bones.       There is a 2.2 cm complex lesion in the upper pole of the left kidney which was previously characterized enhancing renal neoplasm by MRI. Correlate with prior workup.   Calcifications in the pancreatic head may relate to sequela of chronic pancreatitis. No acute peripancreatic inflammatory changes or fluid is seen   There are several subcentimeter lucent foci in bilateral iliac bones. Correlate with above workup to exclude lytic metastases. Further evaluation with bone scan or attention on continued follow-up is advised.   Additional findings as described above.   MACRO: None   Signed by: Ifeoma Norman 2/1/2024 8:08 PM Dictation workstation:   [id number]        Assessment/Plan   Principal Problem:    Non-recurrent bilateral inguinal hernia without obstruction or gangrene    Prosper Mitchell is a 68 y.o. male with prior CABG and open LIHR, presenting with symptomatic right inguinal hernia; presenting for elective robotic assisted repair.              Tyrel Arechiga MD

## 2024-02-26 NOTE — ANESTHESIA POSTPROCEDURE EVALUATION
Patient: Prosper Mitchell    Procedure Summary       Date: 02/26/24 Room / Location: [facility] OR 09 / Virtual [facility] OR    Anesthesia Start: 1029 Anesthesia Stop: 1155    Procedure: ROBOT ASSISTED RIGHT INGUINAL HERNIA REPAIR (Right) Diagnosis:       Non-recurrent bilateral inguinal hernia without obstruction or gangrene      (Non-recurrent bilateral inguinal hernia without obstruction or gangrene [K40.20])    Surgeons: Thaddeus Guo MD PhD Responsible Provider: Al oHyos MD    Anesthesia Type: general ASA Status: 3            Anesthesia Type: general    Vitals Value Taken Time   /80 02/26/24 1152   Temp 36.9 02/26/24 1155   Pulse 66 02/26/24 1154   Resp 16 02/26/24 1155   SpO2 99% 02/26/24 1154   Vitals shown include unvalidated device data.    Anesthesia Post Evaluation    Patient location during evaluation: bedside  Patient participation: complete - patient participated  Level of consciousness: awake and alert  Pain score: 0  Pain management: satisfactory to patient  Airway patency: patent  Cardiovascular status: acceptable and hemodynamically stable  Respiratory status: acceptable and face mask  Hydration status: acceptable  Postoperative Nausea and Vomiting: none      There were no known notable events for this encounter.

## 2024-02-26 NOTE — PRE-PROCEDURE NOTE
Pt shaved himself prior to coming to hospital. Prosper campoverde stated no open cuts just did wash.

## 2024-02-26 NOTE — OP NOTE
ROBOT ASSISTED RIGHT INGUINAL HERNIA REPAIR (R) Operative Note     Date: 2024  OR Location: [facility]    Name: Prosper Mitchell, : 1955, Age: 68 y.o., MRN: [id number], Sex: male    Diagnosis  Pre-op Diagnosis     * Non-recurrent bilateral inguinal hernia without obstruction or gangrene [K40.20] Post-op Diagnosis     * Non-recurrent bilateral inguinal hernia without obstruction or gangrene [K40.20]     Procedures  ROBOT ASSISTED RIGHT INGUINAL HERNIA REPAIR  55025 - OR LAPAROSCOPY SURG RPR INITIAL INGUINAL HERNIA      Surgeons      * Thaddeus Guo - Primary    Resident/Fellow/Other Assistant:  Surgeon(s) and Role:    Procedure Summary  Anesthesia: General  ASA: III  Anesthesia Staff: Anesthesiologist: Al Hoyos MD  CRNA: KALEIGH Rosa-CRNA  Estimated Blood Loss: 10 mL  Intra-op Medications:   Administrations occurring from 1000 to 1230 on 24:   Medication Name Total Dose   BUPivacaine HCl (Marcaine) 0.5 % (5 mg/mL) injection 10 mL   sterile water irrigation solution 1,000 mL   ceFAZolin in dextrose (iso-os) (Ancef) IVPB 2 g 2 g              Anesthesia Record               Intraprocedure I/O Totals       None           Specimen:   ID Type Source Tests Collected by Time   1 : RIGHT CORD LIPOMA Tissue LIPOMA OF SPERMATIC CORD SURGICAL PATHOLOGY EXAM Thaddeus Guo MD PhD 2024 1109        Staff:   Circulator: Ingrid Moralez RN; Shagufta Bui RN  Scrub Person: Connor Manzano         Drains and/or Catheters: * None in log *    Tourniquet Times:         Implants:  Implants       Type Name Action Serial No.      Surgical Mesh Sling Implant MESH, PROGRIP LAP, 10 X 15 CM, FLATSHEET - [id number] Implanted               Findings: Wide and flat direct hernia right defect with hard fatty tissue adjacent to ileofemoral vein. Large cord lipoma. Both removed.    No defect noted on left. Normal appearing gallbladder.    Indications: Prosper Mitchell is an 68 y.o. male who is having surgery  for Non-recurrent bilateral inguinal hernia without obstruction or gangrene [K40.20].     The patient was seen in the preoperative area. The risks, benefits, complications, treatment options, non-operative alternatives, expected recovery and outcomes were discussed with the patient. The possibilities of reaction to medication, pulmonary aspiration, injury to surrounding structures, bleeding, recurrent infection, the need for additional procedures, failure to diagnose a condition, and creating a complication requiring transfusion or operation were discussed with the patient. The patient concurred with the proposed plan, giving informed consent.  The site of surgery was properly noted/marked if necessary per policy. The patient has been actively warmed in preoperative area. Preoperative antibiotics have been ordered and given within 1 hours of incision. Venous thrombosis prophylaxis have been ordered including bilateral sequential compression devices    Procedure Details: The patient was taken to the operating room. A presurgical huddle was completed which included the patient's identifiers, consent, procedure, and equipment. The patient was placed in the supine position on gel padded operating table and placed under general endotracheal anesthesia. All pressure points were padded. The patient was prepped and draped in the normal sterile fashion. IV prophylactic antibiotics were given prior to incision.     A surgical time out was performed prior to incision. A LUQ 8 mm robotic optiview trocar was used to gain entry into the peritoneal cavity. After entry insufflation was performed to a pressure of 15 mmHg, and the abdomen was examined revealing no injuries. An 8mm robotic cannula was then inserted slightly off midline and an additional 8mm port was placed in the contralateral subcostal margin. The robot was then docked.     The gallbladder was visualized at the patients request and it appeared normal.    Upon  exploring the pelvis, there was evidence of a right sided direct inguinal hernia. An incision was made in the anterior peritoneal wall several inches cephalad to the visualized hernia. The peritoneum was peeled away from the myopectineal orifice and the hernia sac was fully reduced. There was a large hard 3 x 3 cm area of fat stuck between the cord and the illiofemoral vein which was resected and extracted piecemeal. The Vas Deferens was visualized and protected along with the gonadal vessels while another cord lipoma was removed similiarly. Both were sent as specimen. The femoral canal was visualized and no hernia was appreciated. No hernia was present in any other spaces that were visualized. The dissection was continued out laterally peeling peritoneum away from the abdominal wall toward the anterior superior iliac spine to create room for a mesh. A piece of [employer] ProGrip 10 cm x 15 cm mesh was placed to cover all potential hernia sites, centered on the visualized defect, and then the peritoneal flap was closed over the mesh using a 2-0 barbed absorbable suture in running fashion.     A final sweep of the abdomen revealed excellent hemostasis and no foreign bodies within the peritoneal cavity.     All counts were correct. The abdomen was then desufflated and all ports were removed under direct visualization. The port sites were irrigated and closed with 4-0 monocryl subcutaneous suture, and covered with Dermabond skin adhesive. 10 cc of 0.5% Marcaine was administered to the port sites.     All surgical counts were again correct. The patient tolerated the procedure well and was transferred to the PACU in stable condition. I was present and scrubbed for the entire procedure.    Complications:  None; patient tolerated the procedure well.    Disposition: PACU - hemodynamically stable.  Condition: stable         Additional Details: None    Attending Attestation: I was present and scrubbed for the entire  procedure.    Thaddeus Guo  Phone Number: [phone]

## 2024-02-26 NOTE — ANESTHESIA PREPROCEDURE EVALUATION
Patient: Prosper Mitchell    Procedure Information       Date/Time: 02/26/24 1000    Procedure: ROBOT ASSISTED RIGHT INGUINAL HERNIA REPAIR (Right) - Repair Inguinal Hernia Robot-Assisted    Location: [facility] OR 09 / [facility]    Surgeons: Thaddeus Guo MD PhD            Relevant Problems   Cardiovascular   (+) A-fib (CMS/HCC)   (+) Benign essential hypertension   (+) CAD (coronary artery disease)   (+) Chest pain   (+) Hyperlipidemia   (+) Hypertension      GI   (+) Esophageal reflux   (+) GERD without esophagitis   (+) Gastroesophageal reflux disease       Clinical information reviewed:    Allergies  Meds  Problems              NPO Detail:  No data recorded     Physical Exam    Airway  Mallampati: IV  TM distance: >3 FB  Neck ROM: full     Cardiovascular - normal exam  Rhythm: regular  Rate: normal     Dental - normal exam       Pulmonary - normal exam     Abdominal            Anesthesia Plan    History of general anesthesia?: yes  History of complications of general anesthesia?: no    ASA 3     general     The patient is not a current smoker.    intravenous induction   Postoperative administration of opioids is intended.  Trial extubation is planned.  Anesthetic plan and risks discussed with patient.  Use of blood products discussed with patient who consented to blood products.    Plan discussed with CRNA.

## 2024-02-26 NOTE — ANESTHESIA PROCEDURE NOTES
Airway  Date/Time: 2/26/2024 10:37 AM  Urgency: elective    Airway not difficult    Staffing  Performed: CRNA   Authorized by: Al Hoyos MD    Performed by: KALEIGH Rosa-LUCIO  Patient location during procedure: OR    Indications and Patient Condition  Indications for airway management: anesthesia and airway protection  Spontaneous Ventilation: absent  Sedation level: deep  Preoxygenated: yes  Patient position: sniffing  MILS maintained throughout  Mask difficulty assessment: 1 - vent by mask    Final Airway Details  Final airway type: endotracheal airway      Successful airway: ETT  Cuffed: yes   Successful intubation technique: video laryngoscopy ([place])  Facilitating devices/methods: intubating stylet  Endotracheal tube insertion site: oral  Blade type: [place] 4.  Blade size: #4  ETT size (mm): 7.5  Cormack-Lehane Classification: grade I - full view of glottis  Placement verified by: chest auscultation and capnometry   Cuff volume (mL): 10  Measured from: lips  ETT to lips (cm): 21  Number of attempts at approach: 1    Additional Comments  Atraumatic intubation with [place] video laryngoscope, dentition in preanesthetic state.

## 2024-02-26 NOTE — SIGNIFICANT EVENT
Discharge instructions, dermabond handout, scope handout reviewed with patient and patient's wife.

## 2024-02-27 ASSESSMENT — PAIN SCALES - GENERAL: PAINLEVEL_OUTOF10: 0 - NO PAIN

## 2024-03-04 LAB
LABORATORY COMMENT REPORT: NORMAL
PATH REPORT.FINAL DX SPEC: NORMAL
PATH REPORT.GROSS SPEC: NORMAL
PATH REPORT.RELEVANT HX SPEC: NORMAL
PATH REPORT.TOTAL CANCER: NORMAL

## 2024-03-08 ENCOUNTER — HOSPITAL ENCOUNTER (OUTPATIENT)
Dept: RADIOLOGY | Facility: HOSPITAL | Age: 69
Discharge: HOME | End: 2024-03-08
Payer: COMMERCIAL

## 2024-03-08 DIAGNOSIS — D41.02 NEOPLASM OF UNCERTAIN BEHAVIOR OF LEFT KIDNEY: ICD-10-CM

## 2024-03-08 PROCEDURE — 76770 US EXAM ABDO BACK WALL COMP: CPT

## 2024-03-08 PROCEDURE — 76770 US EXAM ABDO BACK WALL COMP: CPT | Performed by: RADIOLOGY

## 2024-03-14 ENCOUNTER — OFFICE VISIT (OUTPATIENT)
Dept: SURGERY | Facility: CLINIC | Age: 69
End: 2024-03-14
Payer: COMMERCIAL

## 2024-03-14 VITALS
HEART RATE: 65 BPM | DIASTOLIC BLOOD PRESSURE: 83 MMHG | TEMPERATURE: 96.5 F | SYSTOLIC BLOOD PRESSURE: 142 MMHG | RESPIRATION RATE: 16 BRPM | OXYGEN SATURATION: 99 % | BODY MASS INDEX: 25.94 KG/M2 | HEIGHT: 70 IN | WEIGHT: 181.2 LBS

## 2024-03-14 DIAGNOSIS — Z09 POSTOPERATIVE EXAMINATION: Primary | ICD-10-CM

## 2024-03-14 DIAGNOSIS — Z98.890 S/P RIGHT INGUINAL HERNIA REPAIR: ICD-10-CM

## 2024-03-14 DIAGNOSIS — Z87.19 S/P RIGHT INGUINAL HERNIA REPAIR: ICD-10-CM

## 2024-03-14 PROCEDURE — 1159F MED LIST DOCD IN RCRD: CPT | Performed by: SURGERY

## 2024-03-14 PROCEDURE — 1160F RVW MEDS BY RX/DR IN RCRD: CPT | Performed by: SURGERY

## 2024-03-14 PROCEDURE — 1036F TOBACCO NON-USER: CPT | Performed by: SURGERY

## 2024-03-14 PROCEDURE — 3008F BODY MASS INDEX DOCD: CPT | Performed by: SURGERY

## 2024-03-14 PROCEDURE — 1126F AMNT PAIN NOTED NONE PRSNT: CPT | Performed by: SURGERY

## 2024-03-14 PROCEDURE — 3079F DIAST BP 80-89 MM HG: CPT | Performed by: SURGERY

## 2024-03-14 PROCEDURE — 3077F SYST BP >= 140 MM HG: CPT | Performed by: SURGERY

## 2024-03-14 PROCEDURE — 99024 POSTOP FOLLOW-UP VISIT: CPT | Performed by: SURGERY

## 2024-03-14 ASSESSMENT — PAIN SCALES - GENERAL: PAINLEVEL: 0-NO PAIN

## 2024-03-14 NOTE — PROGRESS NOTES
"Subjective   The patient is status post Robot Assisted Right Inguinal Hernia Repair - Right.  The patient is not having any pain. Patient is eating well, without constipation, and voiding without difficulty.    Objective   Physical Exam:  /83 (BP Location: Right arm, Patient Position: Sitting, BP Cuff Size: Adult)   Pulse 65   Temp 35.8 °C (96.5 °F)   Resp 16   Ht 1.778 m (5' 10\")   Wt 82.2 kg (181 lb 3.2 oz)   SpO2 99%   BMI 26.00 kg/m²   Constitutional: alert, not in acute distress, well developed, and well nourished  Abdomen: soft, bowel sounds active, non-tender, no abnormal masses, no hernias noted  Incision(s): healing well, no drainage, no erythema, well approximated  Tenderness at incision site: none  Pathology: Cord lipoma.    Assessment/Plan   Diagnoses and all orders for this visit:  S/P right inguinal hernia repair    "

## 2024-03-18 DIAGNOSIS — I10 BENIGN ESSENTIAL HYPERTENSION: ICD-10-CM

## 2024-03-18 RX ORDER — LISINOPRIL 10 MG/1
10 TABLET ORAL DAILY
Qty: 90 TABLET | Refills: 0 | Status: SHIPPED | OUTPATIENT
Start: 2024-03-18 | End: 2024-05-23

## 2024-03-20 ENCOUNTER — ALLIED HEALTH (OUTPATIENT)
Dept: INTEGRATIVE MEDICINE | Facility: CLINIC | Age: 69
End: 2024-03-20
Payer: COMMERCIAL

## 2024-03-20 VITALS
BODY MASS INDEX: 26.24 KG/M2 | HEART RATE: 78 BPM | SYSTOLIC BLOOD PRESSURE: 143 MMHG | OXYGEN SATURATION: 98 % | DIASTOLIC BLOOD PRESSURE: 81 MMHG | WEIGHT: 182.9 LBS

## 2024-03-20 DIAGNOSIS — I48.0 PAROXYSMAL ATRIAL FIBRILLATION (MULTI): Primary | Chronic | ICD-10-CM

## 2024-03-20 DIAGNOSIS — I10 BENIGN ESSENTIAL HYPERTENSION: Chronic | ICD-10-CM

## 2024-03-20 DIAGNOSIS — R53.83 OTHER FATIGUE: ICD-10-CM

## 2024-03-20 DIAGNOSIS — E78.2 MIXED HYPERLIPIDEMIA: Chronic | ICD-10-CM

## 2024-03-20 DIAGNOSIS — E55.9 VITAMIN D DEFICIENCY: ICD-10-CM

## 2024-03-20 PROCEDURE — 99215 OFFICE O/P EST HI 40 MIN: CPT | Performed by: INTERNAL MEDICINE

## 2024-03-20 PROCEDURE — G2212 PROLONG OUTPT/OFFICE VIS: HCPCS | Performed by: INTERNAL MEDICINE

## 2024-03-20 ASSESSMENT — ENCOUNTER SYMPTOMS
UNEXPECTED WEIGHT CHANGE: 0
ABDOMINAL PAIN: 0
DIARRHEA: 0
BACK PAIN: 0
ARTHRALGIAS: 0
FATIGUE: 0
COUGH: 1
CONSTIPATION: 0
APPETITE CHANGE: 0
MYALGIAS: 1

## 2024-03-20 NOTE — PATIENT INSTRUCTIONS
"Please ask your surgeon when you can restart the aspirin  Read packages for making sure things do not contain milk  \"Eighty Eight over knives\" documentary  Forksoverknives.com for recipes and testimonials  Prevent and Reverse Heart Disease by Dr. Gutierrez [facility]- has a website with free lectures and videos about the diet.   Get your labs checked.   Ask your heart doctor about alternatives to lisinopril. Consider Losartan.   "

## 2024-03-20 NOTE — PROGRESS NOTES
"Integrative Medicine Visit:     Subjective   Patient ID: Prosper Mitchell is a 68 y.o. male who presents for Blood Pressure Check (Wants to get off statin drugs /)       HPI  Wants to get off his blood pressure meds and statin meds.   He is on lisinopril and has a cough from this. Was not tried on ARBs per patient. Has been on lisinopril for 4 years.   On statins for many years since heart attack and quadruple bypass. Currently taking crestor 10 mg and he reports some achiness. It improved when his dose was decreased from 40 mg to 10 mg.     Last November had episode of passing out. Thought to be vasovagal. Had a whole scan of his body. Has a spot on his kidney that is being monitored. Also had a spot on his bone but had biopsy and it came back fine.     No results found for: \"MPSIMEBU19\"   Lab Results   Component Value Date    TSH 2.74 10/29/2019      Lab Results   Component Value Date    CHOL 106 05/27/2023    CHOL 119 05/10/2022    CHOL 106 12/19/2020     Lab Results   Component Value Date    HDL 56.0 05/27/2023    HDL 59.5 05/10/2022    HDL 55.0 12/19/2020     No results found for: \"LDLCALC\"  Lab Results   Component Value Date    TRIG 110 05/27/2023    TRIG 99 05/10/2022    TRIG 106 12/19/2020     No components found for: \"CHOLHDL\"        CONCERNS:  wants to stop his heart meds    PMH:  heart attack s/p quadruple bypass   Denies diabetes  Hypertension  hypercholesterolemia    FamMH:  heart disease runs in his family    SOC:   for 41 years. Wife in the room at the visit particiapting in the history.retired now. Was a  in a [employer] department. Has two grown kids.     NUTRITION: tried to stop dairy and cheese. Has stopped drinking milk.   Breakfast: oatmeal with raisins, cinnamon and brown sugar  Dinner: ham with white rice and no vegetable or fruit  Lunch: cornbeef sandwich bread,   Snacks: fiber one bar, sometimes has peaches, appelsauce, celery and carrots, cake  He wont eat beans " because they bother his stomach.   Smoking:  non-smoker    Alcohol use:  none    Exercise:   in the summer he goes biking.  In the winter he walks the malls 2 miles x 3 times; he is a  and even has a green house in the winter; no weight training    SLEEP: gets about 6-7 hours. Wakes at 3 am I n the morning. Lays there and goes to the bathroom. Sometimes naps. Snores on his back. Wakes up feeling refreshed.    STRESS MANAGEMENT: works in the garden. Does fishing in the summer   SUPPORT: wife, does things with his brother- fishing.     Review of Systems   Constitutional:  Negative for appetite change, fatigue and unexpected weight change (has lost weight because watching what he is eating).   HENT:  Negative for congestion and postnasal drip.    Respiratory:  Positive for cough.    Gastrointestinal:  Negative for abdominal pain, constipation and diarrhea.   Musculoskeletal:  Positive for myalgias. Negative for arthralgias and back pain (hx of back pain in the past but not now).            Pain:    Objective   /81 (BP Location: Left arm, Patient Position: Sitting, BP Cuff Size: Adult)   Pulse 78   Wt 83 kg (182 lb 14.4 oz)   SpO2 98%   BMI 26.24 kg/m²       Physical Exam  HENT:      Head: Normocephalic and atraumatic.      Mouth/Throat:      Mouth: Mucous membranes are moist.   Cardiovascular:      Rate and Rhythm: Normal rate.   Pulmonary:      Effort: Pulmonary effort is normal. No respiratory distress.   Musculoskeletal:         General: No swelling or deformity.      Cervical back: Normal range of motion.   Skin:     General: Skin is dry.      Findings: No rash.   Neurological:      General: No focal deficit present.      Mental Status: He is alert and oriented to person, place, and time.   Psychiatric:      Comments: Normal affect                      Assessment/Plan     Problem List Items Addressed This Visit             ICD-10-CM    A-fib (CMS/HCC) - Primary (Chronic) I48.91    Relevant Orders  "   Thyroid Stimulating Hormone    Benign essential hypertension (Chronic) I10    Hyperlipidemia (Chronic) E78.5     Other Visit Diagnoses         Codes    Vitamin D deficiency     E55.9    Relevant Orders    Vitamin D 25-Hydroxy,Total (for eval of Vitamin D levels)    Other fatigue     R53.83    Relevant Orders    Vitamin B12          Heart disease\" discussed the work of Dr. Matt Mejias and how eating a whole foods plant based diet has been shown to help reverse and improve heart disease. Would work closely with his doctors about whether bp med can be decreased. Given the side effect of shravan cough with lisinopril, it seems to me that he could take losartan perhaps but will defer to cardiology about this. I did explain that I thought even if his cholesterol decreases with eating less meat and refined foods that he may still want to continue the statin for its additional benefits of plaque stabilization. Recommend optimize vitamin D level because this can help sometimes with myalgias of statin use in some cases. Even if he does not fully embrace eating plant based he might benefit from any changes he could make.     Recommend Follow up in : 1 month to discuss any questions he has about eating more plant based.     Linh Liu MD PhD    Time Spent  Prep time on day of patient encounter: 5 minutes  Time spent directly with patient, family or caregiver: 60 minutes  Additional Time Spent on Patient Care Activities: 0 minutes  Documentation Time: 5 minutes  Other Time Spent: 0 minutes  Total: 70 minutes                        "

## 2024-03-21 ENCOUNTER — LAB (OUTPATIENT)
Dept: LAB | Facility: LAB | Age: 69
End: 2024-03-21
Payer: COMMERCIAL

## 2024-03-21 DIAGNOSIS — E78.5 HYPERLIPIDEMIA, UNSPECIFIED HYPERLIPIDEMIA TYPE: ICD-10-CM

## 2024-03-21 DIAGNOSIS — R53.83 OTHER FATIGUE: ICD-10-CM

## 2024-03-21 DIAGNOSIS — I48.0 PAROXYSMAL ATRIAL FIBRILLATION (MULTI): Chronic | ICD-10-CM

## 2024-03-21 DIAGNOSIS — E55.9 VITAMIN D DEFICIENCY: ICD-10-CM

## 2024-03-21 LAB
25(OH)D3 SERPL-MCNC: 32 NG/ML (ref 30–100)
ALBUMIN SERPL BCP-MCNC: 4.5 G/DL (ref 3.4–5)
ALP SERPL-CCNC: 56 U/L (ref 33–136)
ALT SERPL W P-5'-P-CCNC: 20 U/L (ref 10–52)
ANION GAP SERPL CALC-SCNC: 11 MMOL/L (ref 10–20)
AST SERPL W P-5'-P-CCNC: 17 U/L (ref 9–39)
BILIRUB SERPL-MCNC: 0.9 MG/DL (ref 0–1.2)
BUN SERPL-MCNC: 12 MG/DL (ref 6–23)
CALCIUM SERPL-MCNC: 9.9 MG/DL (ref 8.6–10.3)
CHLORIDE SERPL-SCNC: 103 MMOL/L (ref 98–107)
CHOLEST SERPL-MCNC: 108 MG/DL (ref 0–199)
CHOLESTEROL/HDL RATIO: 2
CO2 SERPL-SCNC: 33 MMOL/L (ref 21–32)
CREAT SERPL-MCNC: 0.86 MG/DL (ref 0.5–1.3)
EGFRCR SERPLBLD CKD-EPI 2021: >90 ML/MIN/1.73M*2
GLUCOSE SERPL-MCNC: 88 MG/DL (ref 74–99)
HDLC SERPL-MCNC: 54 MG/DL
LDLC SERPL CALC-MCNC: 17 MG/DL
NON HDL CHOLESTEROL: 54 MG/DL (ref 0–149)
POTASSIUM SERPL-SCNC: 4.5 MMOL/L (ref 3.5–5.3)
PROT SERPL-MCNC: 5.9 G/DL (ref 6.4–8.2)
SODIUM SERPL-SCNC: 142 MMOL/L (ref 136–145)
TRIGL SERPL-MCNC: 183 MG/DL (ref 0–149)
TSH SERPL-ACNC: 1.21 MIU/L (ref 0.44–3.98)
VIT B12 SERPL-MCNC: 534 PG/ML (ref 211–911)
VLDL: 37 MG/DL (ref 0–40)

## 2024-03-21 PROCEDURE — 80061 LIPID PANEL: CPT

## 2024-03-21 PROCEDURE — 82306 VITAMIN D 25 HYDROXY: CPT

## 2024-03-21 PROCEDURE — 82607 VITAMIN B-12: CPT

## 2024-03-21 PROCEDURE — 84443 ASSAY THYROID STIM HORMONE: CPT

## 2024-03-21 PROCEDURE — 36415 COLL VENOUS BLD VENIPUNCTURE: CPT

## 2024-03-21 PROCEDURE — 80053 COMPREHEN METABOLIC PANEL: CPT

## 2024-04-18 ENCOUNTER — LAB (OUTPATIENT)
Dept: LAB | Facility: CLINIC | Age: 69
End: 2024-04-18
Payer: COMMERCIAL

## 2024-04-18 DIAGNOSIS — C79.51 MALIGNANT NEOPLASM METASTATIC TO BONE (MULTI): ICD-10-CM

## 2024-04-18 DIAGNOSIS — N28.89 LEFT KIDNEY MASS: ICD-10-CM

## 2024-04-18 LAB
ALBUMIN SERPL BCP-MCNC: 4.2 G/DL (ref 3.4–5)
ALP SERPL-CCNC: 55 U/L (ref 33–136)
ALT SERPL W P-5'-P-CCNC: 23 U/L (ref 10–52)
ANION GAP SERPL CALC-SCNC: 7 MMOL/L (ref 10–20)
AST SERPL W P-5'-P-CCNC: 20 U/L (ref 9–39)
BASOPHILS # BLD AUTO: 0.01 X10*3/UL (ref 0–0.1)
BASOPHILS NFR BLD AUTO: 0.3 %
BILIRUB SERPL-MCNC: 0.8 MG/DL (ref 0–1.2)
BUN SERPL-MCNC: 11 MG/DL (ref 6–23)
CALCIUM SERPL-MCNC: 9.1 MG/DL (ref 8.6–10.3)
CHLORIDE SERPL-SCNC: 104 MMOL/L (ref 98–107)
CO2 SERPL-SCNC: 32 MMOL/L (ref 21–32)
CREAT SERPL-MCNC: 0.77 MG/DL (ref 0.5–1.3)
EGFRCR SERPLBLD CKD-EPI 2021: >90 ML/MIN/1.73M*2
EOSINOPHIL # BLD AUTO: 0.22 X10*3/UL (ref 0–0.7)
EOSINOPHIL NFR BLD AUTO: 6 %
ERYTHROCYTE [DISTWIDTH] IN BLOOD BY AUTOMATED COUNT: 12 % (ref 11.5–14.5)
GLUCOSE SERPL-MCNC: 84 MG/DL (ref 74–99)
HCT VFR BLD AUTO: 42.2 % (ref 41–52)
HGB BLD-MCNC: 14.7 G/DL (ref 13.5–17.5)
IMM GRANULOCYTES # BLD AUTO: 0.01 X10*3/UL (ref 0–0.7)
IMM GRANULOCYTES NFR BLD AUTO: 0.3 % (ref 0–0.9)
LYMPHOCYTES # BLD AUTO: 1.29 X10*3/UL (ref 1.2–4.8)
LYMPHOCYTES NFR BLD AUTO: 35.1 %
MCH RBC QN AUTO: 32.9 PG (ref 26–34)
MCHC RBC AUTO-ENTMCNC: 34.8 G/DL (ref 32–36)
MCV RBC AUTO: 94 FL (ref 80–100)
MONOCYTES # BLD AUTO: 0.4 X10*3/UL (ref 0.1–1)
MONOCYTES NFR BLD AUTO: 10.9 %
NEUTROPHILS # BLD AUTO: 1.74 X10*3/UL (ref 1.2–7.7)
NEUTROPHILS NFR BLD AUTO: 47.4 %
NRBC BLD-RTO: 0 /100 WBCS (ref 0–0)
PLATELET # BLD AUTO: 131 X10*3/UL (ref 150–450)
POTASSIUM SERPL-SCNC: 4.2 MMOL/L (ref 3.5–5.3)
PROT SERPL-MCNC: 5.5 G/DL (ref 6.4–8.2)
PROT SERPL-MCNC: 5.6 G/DL (ref 6.4–8.2)
RBC # BLD AUTO: 4.47 X10*6/UL (ref 4.5–5.9)
SODIUM SERPL-SCNC: 139 MMOL/L (ref 136–145)
WBC # BLD AUTO: 3.7 X10*3/UL (ref 4.4–11.3)

## 2024-04-18 PROCEDURE — 84165 PROTEIN E-PHORESIS SERUM: CPT | Performed by: PATHOLOGY

## 2024-04-18 PROCEDURE — 83521 IG LIGHT CHAINS FREE EACH: CPT | Mod: PARLAB

## 2024-04-18 PROCEDURE — 84155 ASSAY OF PROTEIN SERUM: CPT | Mod: PARLAB

## 2024-04-18 PROCEDURE — 36415 COLL VENOUS BLD VENIPUNCTURE: CPT

## 2024-04-18 PROCEDURE — 86320 SERUM IMMUNOELECTROPHORESIS: CPT | Performed by: PATHOLOGY

## 2024-04-18 PROCEDURE — 85025 COMPLETE CBC W/AUTO DIFF WBC: CPT

## 2024-04-18 PROCEDURE — 84165 PROTEIN E-PHORESIS SERUM: CPT | Mod: PARLAB,91

## 2024-04-18 PROCEDURE — 84075 ASSAY ALKALINE PHOSPHATASE: CPT

## 2024-04-19 LAB
KAPPA LC SERPL-MCNC: 0.78 MG/DL (ref 0.33–1.94)
KAPPA LC/LAMBDA SER: 0.02 {RATIO} (ref 0.26–1.65)
LAMBDA LC SERPL-MCNC: 40.08 MG/DL (ref 0.57–2.63)

## 2024-04-22 ENCOUNTER — ALLIED HEALTH (OUTPATIENT)
Dept: INTEGRATIVE MEDICINE | Facility: CLINIC | Age: 69
End: 2024-04-22
Payer: COMMERCIAL

## 2024-04-22 VITALS — BODY MASS INDEX: 24.68 KG/M2 | WEIGHT: 172 LBS

## 2024-04-22 DIAGNOSIS — I25.10 CORONARY ARTERY DISEASE INVOLVING NATIVE CORONARY ARTERY OF NATIVE HEART WITHOUT ANGINA PECTORIS: Primary | Chronic | ICD-10-CM

## 2024-04-22 LAB
ALBUMIN: 4 G/DL (ref 3.4–5)
ALPHA 1 GLOBULIN: 0.3 G/DL (ref 0.2–0.6)
ALPHA 2 GLOBULIN: 0.5 G/DL (ref 0.4–1.1)
BETA GLOBULIN: 0.5 G/DL (ref 0.5–1.2)
GAMMA GLOBULIN: 0.4 G/DL (ref 0.5–1.4)
IMMUNOFIXATION COMMENT: ABNORMAL
M-PROTEIN 1: 0 G/DL
PATH REVIEW - SERUM IMMUNOFIXATION: ABNORMAL
PATH REVIEW-SERUM PROTEIN ELECTROPHORESIS: ABNORMAL
PROTEIN ELECTROPHORESIS COMMENT: ABNORMAL

## 2024-04-22 PROCEDURE — 99215 OFFICE O/P EST HI 40 MIN: CPT | Performed by: INTERNAL MEDICINE

## 2024-04-22 ASSESSMENT — ENCOUNTER SYMPTOMS
BACK PAIN: 0
UNEXPECTED WEIGHT CHANGE: 0
COUGH: 0
APPETITE CHANGE: 0
CONSTIPATION: 0
MYALGIAS: 1
ABDOMINAL PAIN: 0
DIARRHEA: 0
FATIGUE: 0
ARTHRALGIAS: 0

## 2024-04-22 NOTE — PROGRESS NOTES
Integrative Medicine Follow-up Visit :     Subjective   Patient ID: Prosper Mitchell is a 68 y.o. male who presents for heart disease       HPI  Eating plant based exclusively since our last visit.  Finding some of it hard. Looking for new recipes. Getting a lot of symptoms of gas. No isseus with constipation. Usually the opposite. Not much change in her bowel movement.s he has become primarily plant based. Wife is eating more like this but not yet fully plant based. He has lost 6 lbs. Sometimes he is hungry but not sure what to eat.   Got off his blood pressure meds. Was getting dizzy. Blood pressure 100/60 on lisinopril.   He is taking vitamin b12.   Taking vitamin d.   Checking his blood pressure at home. 134/72/           Pain:denies    Review of Systems   Constitutional:  Negative for appetite change, fatigue and unexpected weight change (has lost weight because watching what he is eating).   HENT:  Negative for congestion and postnasal drip.    Respiratory:  Negative for cough.    Gastrointestinal:  Negative for abdominal pain, constipation and diarrhea.   Musculoskeletal:  Positive for myalgias. Negative for arthralgias and back pain (hx of back pain in the past but not now).                  Objective   Wt 78 kg (172 lb)   BMI 24.68 kg/m²     Physical Exam  HENT:      Head: Normocephalic and atraumatic.      Mouth/Throat:      Mouth: Mucous membranes are moist.   Cardiovascular:      Rate and Rhythm: Normal rate.   Pulmonary:      Effort: Pulmonary effort is normal. No respiratory distress.   Musculoskeletal:         General: No swelling or deformity.      Cervical back: Normal range of motion.   Skin:     General: Skin is dry.      Findings: No rash.   Neurological:      General: No focal deficit present.      Mental Status: He is alert and oriented to person, place, and time.   Psychiatric:      Comments: Normal affect                      Assessment/Plan     Problem List Items Addressed This Visit              ICD-10-CM    CAD (coronary artery disease) - Primary (Chronic) I25.10     Patient tapered his own blood pressure meds. I suggest that he review this decision with his cardiologist. Sometimes they prefer a very low dose of lisinopril to be continued.   Discussed Discussed work of Dr. Matt Mejias and how eating a very low fat whole food plant based diet has been shown to reverse/ improve coronary artery disease. Gave patient references to learn more about this way of eating.  Gave specific examples of ways to help him continue to eat this. Discussed how to eat more quantity so that he is not hungry. Encouraged wife to continue to eat this way. He is taking a b12 supplement.           See AVS.     Recommend Follow up in : 2 months    Linh Liu MD PhD    Time Spent  Prep time on day of patient encounter: 5 minutes  Time spent directly with patient, family or caregiver: 30 minutes  Additional Time Spent on Patient Care Activities: 0 minutes  Documentation Time: 5 minutes  Other Time Spent: 0 minutes  Total: 40 minutes

## 2024-04-22 NOTE — ASSESSMENT & PLAN NOTE
Patient tapered his own blood pressure meds. I suggest that he review this decision with his cardiologist. Sometimes they prefer a very low dose of lisinopril to be continued.   Discussed Discussed work of Dr. Matt Mejias and how eating a very low fat whole food plant based diet has been shown to reverse/ improve coronary artery disease. Gave patient references to learn more about this way of eating.  Gave specific examples of ways to help him continue to eat this. Discussed how to eat more quantity so that he is not hungry. Encouraged wife to continue to eat this way. He is taking a b12 supplement.

## 2024-04-22 NOTE — PATIENT INSTRUCTIONS
Include ground flax seeds daily  Try the experiment of chewing your food 20 x with each bite  Increase your water to 60 ounces per day. See if this helps the gas.   Make sure that you are not hungry on this plan. Eat fruit for snacks.    Eating before social events is critical.   Suggest Rjei Lindquistcristianaanum Men's multiple vitamin for iodine, other trace minerals.   Linh Liu MD PhD

## 2024-04-24 ENCOUNTER — OFFICE VISIT (OUTPATIENT)
Dept: HEMATOLOGY/ONCOLOGY | Facility: CLINIC | Age: 69
End: 2024-04-24
Payer: COMMERCIAL

## 2024-04-24 VITALS
RESPIRATION RATE: 16 BRPM | WEIGHT: 171.96 LBS | TEMPERATURE: 97.2 F | HEART RATE: 71 BPM | DIASTOLIC BLOOD PRESSURE: 78 MMHG | SYSTOLIC BLOOD PRESSURE: 145 MMHG | BODY MASS INDEX: 24.67 KG/M2 | OXYGEN SATURATION: 100 %

## 2024-04-24 DIAGNOSIS — N28.89 LEFT KIDNEY MASS: ICD-10-CM

## 2024-04-24 DIAGNOSIS — C79.51 MALIGNANT NEOPLASM METASTATIC TO BONE (MULTI): ICD-10-CM

## 2024-04-24 PROCEDURE — 1126F AMNT PAIN NOTED NONE PRSNT: CPT | Performed by: INTERNAL MEDICINE

## 2024-04-24 PROCEDURE — 3008F BODY MASS INDEX DOCD: CPT | Performed by: INTERNAL MEDICINE

## 2024-04-24 PROCEDURE — 1160F RVW MEDS BY RX/DR IN RCRD: CPT | Performed by: INTERNAL MEDICINE

## 2024-04-24 PROCEDURE — 99213 OFFICE O/P EST LOW 20 MIN: CPT | Performed by: INTERNAL MEDICINE

## 2024-04-24 PROCEDURE — 3078F DIAST BP <80 MM HG: CPT | Performed by: INTERNAL MEDICINE

## 2024-04-24 PROCEDURE — 3077F SYST BP >= 140 MM HG: CPT | Performed by: INTERNAL MEDICINE

## 2024-04-24 PROCEDURE — 1159F MED LIST DOCD IN RCRD: CPT | Performed by: INTERNAL MEDICINE

## 2024-04-24 ASSESSMENT — COLUMBIA-SUICIDE SEVERITY RATING SCALE - C-SSRS
1. IN THE PAST MONTH, HAVE YOU WISHED YOU WERE DEAD OR WISHED YOU COULD GO TO SLEEP AND NOT WAKE UP?: NO
6. HAVE YOU EVER DONE ANYTHING, STARTED TO DO ANYTHING, OR PREPARED TO DO ANYTHING TO END YOUR LIFE?: NO
2. HAVE YOU ACTUALLY HAD ANY THOUGHTS OF KILLING YOURSELF?: NO

## 2024-04-24 ASSESSMENT — PAIN SCALES - GENERAL: PAINLEVEL: 0-NO PAIN

## 2024-04-24 NOTE — PROGRESS NOTES
IgG lambda free light chains 43.16.    IgG lambda M protein 0.1 g/dL in November 2023.    PSA 0.58 NG per mL in November 2023    Prosper Mitchell is a 68 y.o. who presents for Syncope (Pt states he had gone to the bathroom and after that went to go to his room. He states that he felt hot and clammy. Wife states he was grey/white and passed out. Pt lost control of bladder/bowel).  Right sacral bone biopsy on January 11, 2024 revealed bone trabecular and bone marrow with trilineage hematopoiesis no evidence of malignancy.     HPI:  This is a 68yom with history listed below presenting after a syncopal episode at home.   He says he stood up and suddenly felt extremely diaphoretic, nauseated, and lightheaded resulting in a fall.   No reported convulsions however did have incontinence of bowel and bladder.  On arrival, he was hypotensive and bradycardic and was given atropine in the ED with good response.  He was found to have a grade 2 splenic laceration as well as a few other incidental findings on his scans.  Included in these findings was an enhancing L renal lesions as well as lucent lesions involving the sacrum and iliac bone.  Hematology consulted for this.    The patient and his wife had come for follow-up on April 24, 2024 regarding further evaluation of accidentally discovered left kidney mass and possible bone lesions.  The patient was admitted to the hospital after a syncopal episode with possible splenic tear.  Imaging studies revealed possible left renal mass as well as bone lesions.  The patient is asymptomatic.     All other systems were reviewed and are negative unless stated.      Medical history: CAD, HTN, HLD, GERD  Surgical history: none  Family history: HTN  Social history: no smoking     Physical Exam:  Physical Exam:   General appearance: no distress, well-appearing   HEENT: Atraumatic/normocephalic, moist mucosa  Neck: no obvious deformity, JVP WNL  Respiratory: good air movement, appropriate  respiratory effort, no wheezing or crackles  Cardiovascular: regular rate, regular rhythm, no peripheral edema  Abdomen: no organomegaly  Extremities: strong peripheral pulses, no grossly obvious deformities   Skin: intact, no rashes   Neurologic: Alert and oriented x 3, No obvious focal deficit  Psych: appropriate mood & affect, cooperative   All other imaging, labs, and recent documents were reviewed and discussed.     Assessment & Plan: The patient had come for follow-up on April 24, 2024.  68-year-old with CAD presenting after a fall at home.  Appear to be vasovagal in nature.  Incidentally found to have left renal nodule on CT scan.   Renal incidentaloma  MRI revealed a 2 cm lesion upper pole of left kidney strongly suggestive of neoplasm.  Bone scan also suggestive of uptake in left ilium suggestive of metastasis.  There are additional areas in the ribs suggestive of post traumatic lesions.  Keeping in mind that the patient had a recent fall at home.  Referred to urology services for further evaluation consider kidney biopsy.  The lesion is too small and less likely to have metastasized.  I have given a benefit of doubt.  If necessary consider a bone biopsy as well.  Left sacral bone lesion biopsy on January 11, 2024 did not reveal any evidence of malignancy.  The patient to follow closely with urology services.    Elevated free kappa light chains/MGUS.  Bone biopsy on January 11, 2024 revealed bone trabeculae and trilineage hematopoiesis without evidence of malignancy.  To be followed clinically.  Return in 6 months.

## 2024-05-07 PROBLEM — I48.0 PAROXYSMAL ATRIAL FIBRILLATION (MULTI): Chronic | Status: ACTIVE | Noted: 2023-05-24

## 2024-05-07 PROBLEM — E55.9 VITAMIN D DEFICIENCY: Status: ACTIVE | Noted: 2024-03-21

## 2024-05-07 PROBLEM — D41.02 NEOPLASM OF UNCERTAIN BEHAVIOR OF LEFT KIDNEY: Status: ACTIVE | Noted: 2024-05-07

## 2024-05-07 PROBLEM — R53.83 FATIGUE: Status: ACTIVE | Noted: 2024-03-21

## 2024-05-23 ENCOUNTER — OFFICE VISIT (OUTPATIENT)
Dept: CARDIOLOGY | Facility: CLINIC | Age: 69
End: 2024-05-23
Payer: COMMERCIAL

## 2024-05-23 VITALS
BODY MASS INDEX: 24.11 KG/M2 | OXYGEN SATURATION: 98 % | WEIGHT: 168 LBS | HEART RATE: 75 BPM | SYSTOLIC BLOOD PRESSURE: 136 MMHG | DIASTOLIC BLOOD PRESSURE: 84 MMHG

## 2024-05-23 DIAGNOSIS — E78.2 MIXED HYPERLIPIDEMIA: Chronic | ICD-10-CM

## 2024-05-23 DIAGNOSIS — I48.0 PAROXYSMAL ATRIAL FIBRILLATION (MULTI): Chronic | ICD-10-CM

## 2024-05-23 DIAGNOSIS — I25.10 CORONARY ARTERY DISEASE INVOLVING NATIVE CORONARY ARTERY OF NATIVE HEART WITHOUT ANGINA PECTORIS: Chronic | ICD-10-CM

## 2024-05-23 DIAGNOSIS — R00.1 BRADYCARDIA: ICD-10-CM

## 2024-05-23 DIAGNOSIS — I10 PRIMARY HYPERTENSION: Chronic | ICD-10-CM

## 2024-05-23 DIAGNOSIS — I10 BENIGN ESSENTIAL HYPERTENSION: Primary | Chronic | ICD-10-CM

## 2024-05-23 PROCEDURE — 1160F RVW MEDS BY RX/DR IN RCRD: CPT | Performed by: INTERNAL MEDICINE

## 2024-05-23 PROCEDURE — 3079F DIAST BP 80-89 MM HG: CPT | Performed by: INTERNAL MEDICINE

## 2024-05-23 PROCEDURE — 3008F BODY MASS INDEX DOCD: CPT | Performed by: INTERNAL MEDICINE

## 2024-05-23 PROCEDURE — 1036F TOBACCO NON-USER: CPT | Performed by: INTERNAL MEDICINE

## 2024-05-23 PROCEDURE — 99213 OFFICE O/P EST LOW 20 MIN: CPT | Performed by: INTERNAL MEDICINE

## 2024-05-23 PROCEDURE — 1159F MED LIST DOCD IN RCRD: CPT | Performed by: INTERNAL MEDICINE

## 2024-05-23 PROCEDURE — 3075F SYST BP GE 130 - 139MM HG: CPT | Performed by: INTERNAL MEDICINE

## 2024-05-23 RX ORDER — ASPIRIN 81 MG/1
81 TABLET ORAL DAILY
Qty: 30 TABLET | Refills: 11 | Status: SHIPPED | OUTPATIENT
Start: 2024-05-23 | End: 2025-05-23

## 2024-05-23 NOTE — PATIENT INSTRUCTIONS
1. CAD. November 2019 status post CABG LIMA to the LAD SVG to the ramus PDA and diagonal. Doing well. Physically active.  No cardiac symptoms.  I have asked for him to go back on a baby aspirin daily 3 to 4 days out of the week.  He is tolerating rosuvastatin and therefore it will be continued.    2. Hyperlipidemia. 5/27/23 LDL 28, HDL 56, Trig 110This will be followed by his primary care doctor.  He did develop myalgias with atorvastatin and pravastatin. I placed him on Rosuvastatin 10. 4/18/24 LDL 17, HDL 54, Trig 183.  His numbers are excellent.  Continue with rosuvastatin which she tolerates well     3. Hypertension since losing 15 pounds his weight has come down his blood pressures have come down.  He stopped the lisinopril and is doing great without     4. Pafib- Palpitations. Minimal to none. He did have a burst of atrial fibrillation postoperatively in 2019. No recurrence.    5.  Preoperative clearance.  He is scheduled for hernia surgery.  His cardiac risk is moderately elevated because of the existence of coronary disease but acceptable.  Hold aspirin for 5 days prior to the surgery.     He is doing well.  He underwent his hernia surgery without difficulty.  He will continue his new lifestyle changes and keep the weight down.  He will continue the rosuvastatin and will restart the baby aspirin several days out of the week.  My plan will be to see him back in 1 year.  I will see him sooner if any issues arise

## 2024-05-23 NOTE — PROGRESS NOTES
Referred by No ref. provider found    NIRMAL Cheng is here for follow-up.  Overall from cardiac standpoint feeling fine.  He is adjusted his diet.  He is down 9 pounds from when I seen him in January but since I had seen him his weight went up.  He is actually down 15 pounds.  Tolerating rosuvastatin.  He stopped his aspirin to see if that helps with the stomach issues and it really did not make a difference.  Stopped taking lisinopril because blood pressures have dropped since he lost 15 pounds.  Past Medical History:  Problem List Items Addressed This Visit    None       Past Medical History:   Diagnosis Date    A-fib (Multi) 05/24/2023    Atrial fibrillation (Multi) 05/24/2023    Pafib post op 2019    Benign essential hypertension 05/24/2023    CAD (coronary artery disease)     HLD (hyperlipidemia)     HTN (hypertension)     Hyperlipidemia 05/24/2023    Hypertension 05/30/2023    Malignant neoplasm metastatic to bone (Multi) 11/20/2023    Paroxysmal atrial fibrillation (Multi) 05/24/2023    postop 2019             Past Surgical History:  He has a past surgical history that includes Hernia repair (Left, 2004) and Coronary artery bypass graft (11/2019).      Social History:  He reports that he has never smoked. He has never been exposed to tobacco smoke. He has never used smokeless tobacco. He reports that he does not currently use alcohol. He reports that he does not use drugs.    Family History:  Family History   Problem Relation Name Age of Onset    Coronary artery disease Brother          Allergies:  Dexamethasone, Statins-hmg-coa reductase inhibitors, and Ciprofloxacin    Outpatient Medications:  Current Outpatient Medications   Medication Instructions    lisinopril 10 mg, oral, Daily    rosuvastatin (CRESTOR) 10 mg, oral, Daily        Last Recorded Vitals:  There were no vitals filed for this visit.    Physical Exam    Physical  Patient is alert and oriented x3.  HEENT is unremarkable mucous members are  moist  Neck no JVP no bruits upstrokes are full no thyromegaly  Lungs are clear bilaterally.  No wheezing crackles or rales  Heart regular rhythm normal S1-S2 there is no S3 no murmurs are heard.  Abdomen is soft vessels are positive nontender nondistended no organomegaly no pulsatile masses  Extremities have no edema.  Distal pulses present palpable.  Neuro is grossly nonfocal  Skin has no rashes     Last Labs:  CBC -  Lab Results   Component Value Date    WBC 3.7 (L) 04/18/2024    HGB 14.7 04/18/2024    HCT 42.2 04/18/2024    MCV 94 04/18/2024     (L) 04/18/2024       CMP -  Lab Results   Component Value Date    CALCIUM 9.1 04/18/2024    PHOS 4.3 11/09/2019    PROT 5.5 (L) 04/18/2024    PROT 5.6 (L) 04/18/2024    ALBUMIN 4.2 04/18/2024    AST 20 04/18/2024    ALT 23 04/18/2024    ALKPHOS 55 04/18/2024    BILITOT 0.8 04/18/2024       LIPID PANEL -   Lab Results   Component Value Date    CHOL 108 03/21/2024    HDL 54.0 03/21/2024    CHHDL 2.0 03/21/2024    VLDL 37 03/21/2024    TRIG 183 (H) 03/21/2024    NHDL 54 03/21/2024       RENAL FUNCTION PANEL -   Lab Results   Component Value Date    K 4.2 04/18/2024    PHOS 4.3 11/09/2019       Lab Results   Component Value Date    BNP 61 10/27/2019    HGBA1C 5.0 10/29/2019     Procedure    Echo 11/13/23 EF NL, DDF, Mild - mod LAE    REGULAR STRESS [12/09/2019]: 7 min 24 sec (9.30 METs) ... Normal - Average functional capacity for age. No exercise associated cardiac symptoms. PVC's and ventricular couplets at peak exercise. Negative exercise ECG for inducible ischemia at moderate WL.      HOLTER [11/26/2019]: SR, -65. No ep/of A-fib / PSVT / high-grade AV block. Rare PVCs w/no VT. No arry sx documented.      ECHO [11/06/2019]: EF 55-60%. Abnormal septal motion c/w postop status. Absent A-wave on MV spectral Doppler tracing c/w A-fib.      CABG x4 [11/04/2019, Dr. Briggs]: LEOBARDO-LAD, VG-DIAG, VG-RI, VG-PDA     CATH [10/28/2019]: Severe 3 vessel disease - culprit  proximal LAD with ulcerated plaque. Referral for bypass consideration. Medical optimization per primary cardiology team. LVEDP = 10.      CAROTID [10/28/2019]: Less than 50% SENIA / LICA      ECHO [10/28/2019]: EF 65-70%. SD = impaired relaxation pattern of LV diastolic filling. RVSP wnl. Mild aortic valve regurgitation.      CT / SCORING [08/29/2019] = 659.66 (LM 9.17, .19, LCx 1.29, .01)     PHARM NST [09/04/2019]: Normal â€“ 55%         Assessment/Plan      1. CAD. November 2019 status post CABG LIMA to the LAD SVG to the ramus PDA and diagonal. Doing well. Physically active.  No cardiac symptoms.  I have asked for him to go back on a baby aspirin daily 3 to 4 days out of the week.  He is tolerating rosuvastatin and therefore it will be continued.    2. Hyperlipidemia. 5/27/23 LDL 28, HDL 56, Trig 110This will be followed by his primary care doctor.  He did develop myalgias with atorvastatin and pravastatin. I placed him on Rosuvastatin 10. 4/18/24 LDL 17, HDL 54, Trig 183.  His numbers are excellent.  Continue with rosuvastatin which she tolerates well     3. Hypertension since losing 15 pounds his weight has come down his blood pressures have come down.  He stopped the lisinopril and is doing great without     4. Pafib- Palpitations. Minimal to none. He did have a burst of atrial fibrillation postoperatively in 2019. No recurrence.    5.  Preoperative clearance.  He is scheduled for hernia surgery.  His cardiac risk is moderately elevated because of the existence of coronary disease but acceptable.  Hold aspirin for 5 days prior to the surgery.     He is doing well.  He underwent his hernia surgery without difficulty.  He will continue his new lifestyle changes and keep the weight down.  He will continue the rosuvastatin and will restart the baby aspirin several days out of the week.  My plan will be to see him back in 1 year.  I will see him sooner if any issues arise  Jarad Huang MD      Instructions and follow up

## 2024-06-18 ENCOUNTER — APPOINTMENT (OUTPATIENT)
Dept: INTEGRATIVE MEDICINE | Facility: CLINIC | Age: 69
End: 2024-06-18
Payer: COMMERCIAL

## 2024-07-25 ENCOUNTER — APPOINTMENT (OUTPATIENT)
Dept: PRIMARY CARE | Facility: CLINIC | Age: 69
End: 2024-07-25
Payer: COMMERCIAL

## 2024-07-25 VITALS
HEART RATE: 70 BPM | BODY MASS INDEX: 24.34 KG/M2 | DIASTOLIC BLOOD PRESSURE: 74 MMHG | SYSTOLIC BLOOD PRESSURE: 120 MMHG | OXYGEN SATURATION: 98 % | WEIGHT: 170 LBS | HEIGHT: 70 IN | TEMPERATURE: 97.7 F

## 2024-07-25 DIAGNOSIS — I10 BENIGN ESSENTIAL HYPERTENSION: Chronic | ICD-10-CM

## 2024-07-25 DIAGNOSIS — I25.118 CORONARY ARTERY DISEASE INVOLVING NATIVE CORONARY ARTERY OF NATIVE HEART WITH OTHER FORM OF ANGINA PECTORIS (CMS-HCC): ICD-10-CM

## 2024-07-25 DIAGNOSIS — E55.9 VITAMIN D DEFICIENCY, UNSPECIFIED: ICD-10-CM

## 2024-07-25 DIAGNOSIS — D41.02 NEOPLASM OF UNCERTAIN BEHAVIOR OF LEFT KIDNEY: ICD-10-CM

## 2024-07-25 DIAGNOSIS — I25.10 CORONARY ARTERY DISEASE INVOLVING NATIVE CORONARY ARTERY OF NATIVE HEART WITHOUT ANGINA PECTORIS: Chronic | ICD-10-CM

## 2024-07-25 DIAGNOSIS — N40.0 BENIGN PROSTATIC HYPERPLASIA WITHOUT LOWER URINARY TRACT SYMPTOMS: ICD-10-CM

## 2024-07-25 DIAGNOSIS — E78.2 MIXED HYPERLIPIDEMIA: Chronic | ICD-10-CM

## 2024-07-25 DIAGNOSIS — Z00.00 MEDICARE ANNUAL WELLNESS VISIT, SUBSEQUENT: Primary | ICD-10-CM

## 2024-07-25 PROCEDURE — 1160F RVW MEDS BY RX/DR IN RCRD: CPT | Performed by: FAMILY MEDICINE

## 2024-07-25 PROCEDURE — 1124F ACP DISCUSS-NO DSCNMKR DOCD: CPT | Performed by: FAMILY MEDICINE

## 2024-07-25 PROCEDURE — 3074F SYST BP LT 130 MM HG: CPT | Performed by: FAMILY MEDICINE

## 2024-07-25 PROCEDURE — 99397 PER PM REEVAL EST PAT 65+ YR: CPT | Performed by: FAMILY MEDICINE

## 2024-07-25 PROCEDURE — 3008F BODY MASS INDEX DOCD: CPT | Performed by: FAMILY MEDICINE

## 2024-07-25 PROCEDURE — 3078F DIAST BP <80 MM HG: CPT | Performed by: FAMILY MEDICINE

## 2024-07-25 PROCEDURE — G0439 PPPS, SUBSEQ VISIT: HCPCS | Performed by: FAMILY MEDICINE

## 2024-07-25 PROCEDURE — 1036F TOBACCO NON-USER: CPT | Performed by: FAMILY MEDICINE

## 2024-07-25 PROCEDURE — 1170F FXNL STATUS ASSESSED: CPT | Performed by: FAMILY MEDICINE

## 2024-07-25 PROCEDURE — 1126F AMNT PAIN NOTED NONE PRSNT: CPT | Performed by: FAMILY MEDICINE

## 2024-07-25 PROCEDURE — 1159F MED LIST DOCD IN RCRD: CPT | Performed by: FAMILY MEDICINE

## 2024-07-25 PROCEDURE — 99497 ADVNCD CARE PLAN 30 MIN: CPT | Performed by: FAMILY MEDICINE

## 2024-07-25 ASSESSMENT — ENCOUNTER SYMPTOMS
OCCASIONAL FEELINGS OF UNSTEADINESS: 0
CARDIOVASCULAR NEGATIVE: 1
LOSS OF SENSATION IN FEET: 0
GASTROINTESTINAL NEGATIVE: 1
PSYCHIATRIC NEGATIVE: 1
MUSCULOSKELETAL NEGATIVE: 1
DEPRESSION: 0
RESPIRATORY NEGATIVE: 1
NEUROLOGICAL NEGATIVE: 1
ENDOCRINE NEGATIVE: 1
CONSTITUTIONAL NEGATIVE: 1

## 2024-07-25 ASSESSMENT — ACTIVITIES OF DAILY LIVING (ADL)
DRESSING YOURSELF: INDEPENDENT
FEEDING YOURSELF: INDEPENDENT
PREPARING MEALS: INDEPENDENT
MANAGING FINANCES: INDEPENDENT
WALKS IN HOME: INDEPENDENT
GROCERY SHOPPING: INDEPENDENT
DOING HOUSEWORK: INDEPENDENT
NEEDS ASSISTANCE WITH FOOD: INDEPENDENT
DRESSING: INDEPENDENT
BATHING: INDEPENDENT
JUDGMENT_ADEQUATE_SAFELY_COMPLETE_DAILY_ACTIVITIES: YES
BATHING: INDEPENDENT
STIL DRIVING: YES
GROOMING: INDEPENDENT
USING TELEPHONE: INDEPENDENT
TOILETING: INDEPENDENT
FEEDING: INDEPENDENT
JUDGMENT_ADEQUATE_SAFELY_COMPLETE_DAILY_ACTIVITIES: YES
PATIENT'S MEMORY ADEQUATE TO SAFELY COMPLETE DAILY ACTIVITIES?: YES
TOILETING: INDEPENDENT
USING TRANSPORTATION: INDEPENDENT
ADEQUATE_TO_COMPLETE_ADL: YES
ADEQUATE_TO_COMPLETE_ADL: YES
EATING: INDEPENDENT

## 2024-07-25 ASSESSMENT — ANXIETY QUESTIONNAIRES
5. BEING SO RESTLESS THAT IT IS HARD TO SIT STILL: NOT AT ALL
7. FEELING AFRAID AS IF SOMETHING AWFUL MIGHT HAPPEN: NOT AT ALL
3. WORRYING TOO MUCH ABOUT DIFFERENT THINGS: NOT AT ALL
2. NOT BEING ABLE TO STOP OR CONTROL WORRYING: NOT AT ALL
6. BECOMING EASILY ANNOYED OR IRRITABLE: NOT AT ALL
1. FEELING NERVOUS, ANXIOUS, OR ON EDGE: NOT AT ALL
GAD7 TOTAL SCORE: 0
4. TROUBLE RELAXING: NOT AT ALL

## 2024-07-25 ASSESSMENT — GERIATRIC MINI NUTRITIONAL ASSESSMENT (MNA)
B WEIGHT LOSS DURING THE LAST 3 MONTHS: NO WEIGHT LOSS
E NEUROPSYCHOLOGICAL PROBLEMS: NO PSYCHOLOGICAL PROBLEMS
D HAS SUFFERED PSYCHOLOGICAL STRESS OR ACUTE DISEASE IN THE PAST 3 MONTHS?: NO
A HAS FOOD INTAKE DECLINED OVER THE PAST 3 MONTHS DUE TO LOSS OF APPETITE, DIGESTIVE PROBLEMS, CHEWING OR SWALLOWING DIFFICULTIES?: NO DECREASE IN FOOD INTAKE
C GENERAL MOBILITY: GOES OUT

## 2024-07-25 ASSESSMENT — PAIN SCALES - GENERAL: PAINLEVEL: 0-NO PAIN

## 2024-07-25 NOTE — PROGRESS NOTES
"Subjective   Patient ID: Prosper Mitchell is a 68 y.o. male who presents for Annual Exam (Annual medicare wellness not fasting ).    HPI     Review of Systems   Constitutional: Negative.    HENT: Negative.     Respiratory: Negative.     Cardiovascular: Negative.         Dr Huang   Gastrointestinal: Negative.    Endocrine: Negative.    Genitourinary: Negative.         Sees urology   Musculoskeletal: Negative.    Neurological: Negative.    Hematological:         Dr Villareal   Psychiatric/Behavioral: Negative.         Objective   /74 (BP Location: Left arm)   Pulse 70   Temp 36.5 °C (97.7 °F) (Temporal)   Ht 1.778 m (5' 10\")   Wt 77.1 kg (170 lb)   SpO2 98%   BMI 24.39 kg/m²     Physical Exam  Vitals and nursing note reviewed. Exam conducted with a chaperone present.   Constitutional:       Appearance: Normal appearance.   HENT:      Right Ear: Tympanic membrane normal.      Left Ear: Tympanic membrane normal.      Mouth/Throat:      Pharynx: Oropharynx is clear.   Cardiovascular:      Rate and Rhythm: Normal rate and regular rhythm.      Pulses: Normal pulses.      Heart sounds: Normal heart sounds.   Pulmonary:      Effort: Pulmonary effort is normal.      Breath sounds: Normal breath sounds.   Abdominal:      Palpations: Abdomen is soft.   Musculoskeletal:         General: Normal range of motion.   Neurological:      General: No focal deficit present.      Mental Status: He is alert and oriented to person, place, and time.   Psychiatric:         Mood and Affect: Mood normal.         Behavior: Behavior normal.         Assessment/Plan patient seen here for a annual Medicare wellness exam.  Reviewed his questionnaire he is agreeable to his responses.  We did discuss advanced directives.  He has no significant difficulty with depression or anxiety.  We are drawing his lab work as an outpatient.  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

## 2024-07-25 NOTE — ACP (ADVANCE CARE PLANNING)
Confirming Previous Code Status:   Advance Care Planning Note     Discussion Date: 07/25/24   Discussion Participants: patient and spouse    The patient wishes to discuss Advance Care Planning today and the following is a brief summary of our discussion.     Patient has capacity to make their own medical decisions: Yes  Health Care Agent/Surrogate Decision Maker documented in chart: Yes    Documents on file and valid:  Advance Directive/Living Will: Yes   Health Care Power of : Yes  Other: none    Communication of Medical Status/Prognosis:   yes     Communication of Treatment Goals/Options:   yes     Treatment Decisions  Goals of Care: survival is paramount regardless of prognosis, treatment outcome, or burden   yes  Follow Up Plan  no  Team Members  myself  Time Statement: Total face to face time spent on advance care planning was 16 minutes with 16 minutes spent in counseling, including the explanation.    Ron Cordero DO  7/25/2024 3:27 PM

## 2024-08-01 ENCOUNTER — LAB (OUTPATIENT)
Dept: LAB | Facility: LAB | Age: 69
End: 2024-08-01
Payer: COMMERCIAL

## 2024-08-01 DIAGNOSIS — E78.2 MIXED HYPERLIPIDEMIA: Chronic | ICD-10-CM

## 2024-08-01 DIAGNOSIS — N40.0 BENIGN PROSTATIC HYPERPLASIA WITHOUT LOWER URINARY TRACT SYMPTOMS: ICD-10-CM

## 2024-08-01 DIAGNOSIS — I10 BENIGN ESSENTIAL HYPERTENSION: Chronic | ICD-10-CM

## 2024-08-01 DIAGNOSIS — D41.02 NEOPLASM OF UNCERTAIN BEHAVIOR OF LEFT KIDNEY: ICD-10-CM

## 2024-08-01 DIAGNOSIS — D49.512 NEOPLASM OF UNSPECIFIED BEHAVIOR OF LEFT KIDNEY: Primary | ICD-10-CM

## 2024-08-01 DIAGNOSIS — E55.9 VITAMIN D DEFICIENCY, UNSPECIFIED: ICD-10-CM

## 2024-08-01 LAB
25(OH)D3 SERPL-MCNC: 50 NG/ML (ref 30–100)
ALBUMIN SERPL BCP-MCNC: 4.1 G/DL (ref 3.4–5)
ALP SERPL-CCNC: 58 U/L (ref 33–136)
ALT SERPL W P-5'-P-CCNC: 24 U/L (ref 10–52)
ANION GAP SERPL CALC-SCNC: 9 MMOL/L (ref 10–20)
AST SERPL W P-5'-P-CCNC: 21 U/L (ref 9–39)
BASOPHILS # BLD AUTO: 0.02 X10*3/UL (ref 0–0.1)
BASOPHILS NFR BLD AUTO: 0.4 %
BILIRUB SERPL-MCNC: 0.8 MG/DL (ref 0–1.2)
BUN SERPL-MCNC: 13 MG/DL (ref 6–23)
CALCIUM SERPL-MCNC: 8.9 MG/DL (ref 8.6–10.3)
CHLORIDE SERPL-SCNC: 105 MMOL/L (ref 98–107)
CHOLEST SERPL-MCNC: 121 MG/DL (ref 0–199)
CHOLESTEROL/HDL RATIO: 2.1
CO2 SERPL-SCNC: 32 MMOL/L (ref 21–32)
CREAT SERPL-MCNC: 0.78 MG/DL (ref 0.5–1.3)
EGFRCR SERPLBLD CKD-EPI 2021: >90 ML/MIN/1.73M*2
EOSINOPHIL # BLD AUTO: 0.18 X10*3/UL (ref 0–0.7)
EOSINOPHIL NFR BLD AUTO: 3.7 %
ERYTHROCYTE [DISTWIDTH] IN BLOOD BY AUTOMATED COUNT: 12.8 % (ref 11.5–14.5)
GLUCOSE SERPL-MCNC: 88 MG/DL (ref 74–99)
HCT VFR BLD AUTO: 44.5 % (ref 41–52)
HDLC SERPL-MCNC: 56.8 MG/DL
HGB BLD-MCNC: 15.3 G/DL (ref 13.5–17.5)
IMM GRANULOCYTES # BLD AUTO: 0.01 X10*3/UL (ref 0–0.7)
IMM GRANULOCYTES NFR BLD AUTO: 0.2 % (ref 0–0.9)
LDLC SERPL CALC-MCNC: 27 MG/DL
LYMPHOCYTES # BLD AUTO: 1.21 X10*3/UL (ref 1.2–4.8)
LYMPHOCYTES NFR BLD AUTO: 24.7 %
MCH RBC QN AUTO: 32.9 PG (ref 26–34)
MCHC RBC AUTO-ENTMCNC: 34.4 G/DL (ref 32–36)
MCV RBC AUTO: 96 FL (ref 80–100)
MONOCYTES # BLD AUTO: 0.5 X10*3/UL (ref 0.1–1)
MONOCYTES NFR BLD AUTO: 10.2 %
NEUTROPHILS # BLD AUTO: 2.97 X10*3/UL (ref 1.2–7.7)
NEUTROPHILS NFR BLD AUTO: 60.8 %
NON HDL CHOLESTEROL: 64 MG/DL (ref 0–149)
NRBC BLD-RTO: 0 /100 WBCS (ref 0–0)
PLATELET # BLD AUTO: 135 X10*3/UL (ref 150–450)
POTASSIUM SERPL-SCNC: 4.7 MMOL/L (ref 3.5–5.3)
PROT SERPL-MCNC: 5.7 G/DL (ref 6.4–8.2)
PSA SERPL-MCNC: 0.56 NG/ML
RBC # BLD AUTO: 4.65 X10*6/UL (ref 4.5–5.9)
SODIUM SERPL-SCNC: 141 MMOL/L (ref 136–145)
TRIGL SERPL-MCNC: 187 MG/DL (ref 0–149)
VIT B12 SERPL-MCNC: 508 PG/ML (ref 211–911)
VLDL: 37 MG/DL (ref 0–40)
WBC # BLD AUTO: 4.9 X10*3/UL (ref 4.4–11.3)

## 2024-08-01 PROCEDURE — 82607 VITAMIN B-12: CPT

## 2024-08-01 PROCEDURE — 80053 COMPREHEN METABOLIC PANEL: CPT

## 2024-08-01 PROCEDURE — 36415 COLL VENOUS BLD VENIPUNCTURE: CPT

## 2024-08-01 PROCEDURE — 84153 ASSAY OF PSA TOTAL: CPT

## 2024-08-01 PROCEDURE — 82306 VITAMIN D 25 HYDROXY: CPT

## 2024-08-01 PROCEDURE — 80061 LIPID PANEL: CPT

## 2024-08-01 PROCEDURE — 85025 COMPLETE CBC W/AUTO DIFF WBC: CPT

## 2024-08-15 ENCOUNTER — HOSPITAL ENCOUNTER (OUTPATIENT)
Dept: RADIOLOGY | Facility: HOSPITAL | Age: 69
Discharge: HOME | End: 2024-08-15
Payer: COMMERCIAL

## 2024-08-15 DIAGNOSIS — D49.512 NEOPLASM OF UNSPECIFIED BEHAVIOR OF LEFT KIDNEY: ICD-10-CM

## 2024-08-15 PROCEDURE — 2550000001 HC RX 255 CONTRASTS: Performed by: UROLOGY

## 2024-08-15 PROCEDURE — A9575 INJ GADOTERATE MEGLUMI 0.1ML: HCPCS | Performed by: UROLOGY

## 2024-08-15 PROCEDURE — 74183 MRI ABD W/O CNTR FLWD CNTR: CPT

## 2024-08-15 RX ORDER — GADOTERATE MEGLUMINE 376.9 MG/ML
15 INJECTION INTRAVENOUS
Status: COMPLETED | OUTPATIENT
Start: 2024-08-15 | End: 2024-08-15

## 2024-09-11 ENCOUNTER — LAB (OUTPATIENT)
Dept: LAB | Facility: LAB | Age: 69
End: 2024-09-11
Payer: COMMERCIAL

## 2024-09-11 ENCOUNTER — OFFICE VISIT (OUTPATIENT)
Dept: GASTROENTEROLOGY | Facility: CLINIC | Age: 69
End: 2024-09-11
Payer: COMMERCIAL

## 2024-09-11 VITALS
HEART RATE: 66 BPM | HEIGHT: 70 IN | DIASTOLIC BLOOD PRESSURE: 73 MMHG | SYSTOLIC BLOOD PRESSURE: 156 MMHG | TEMPERATURE: 98.1 F | BODY MASS INDEX: 23.62 KG/M2 | WEIGHT: 165 LBS

## 2024-09-11 DIAGNOSIS — R19.7 DIARRHEA, UNSPECIFIED TYPE: Primary | ICD-10-CM

## 2024-09-11 DIAGNOSIS — R19.7 DIARRHEA, UNSPECIFIED TYPE: ICD-10-CM

## 2024-09-11 PROCEDURE — 83516 IMMUNOASSAY NONANTIBODY: CPT

## 2024-09-11 PROCEDURE — 99213 OFFICE O/P EST LOW 20 MIN: CPT | Performed by: INTERNAL MEDICINE

## 2024-09-11 PROCEDURE — 3077F SYST BP >= 140 MM HG: CPT | Performed by: INTERNAL MEDICINE

## 2024-09-11 PROCEDURE — 3078F DIAST BP <80 MM HG: CPT | Performed by: INTERNAL MEDICINE

## 2024-09-11 PROCEDURE — 3008F BODY MASS INDEX DOCD: CPT | Performed by: INTERNAL MEDICINE

## 2024-09-11 PROCEDURE — 1159F MED LIST DOCD IN RCRD: CPT | Performed by: INTERNAL MEDICINE

## 2024-09-11 PROCEDURE — 99203 OFFICE O/P NEW LOW 30 MIN: CPT | Performed by: INTERNAL MEDICINE

## 2024-09-11 PROCEDURE — 36415 COLL VENOUS BLD VENIPUNCTURE: CPT

## 2024-09-11 RX ORDER — TAMSULOSIN HYDROCHLORIDE 0.4 MG/1
0.4 CAPSULE ORAL EVERY EVENING
COMMUNITY
Start: 2024-08-22

## 2024-09-11 NOTE — PROGRESS NOTES
History Of Present Illness  Prosper Mitchell is a 68 y.o. male presenting with change in bowel habits.    He describes a longstanding history of fecal urgency with flatus and occasional stool.  Symptoms have been present for many years but have worsened over the past several months.  Symptoms are worse later in the day and has had no nocturnal episodes.  He has tried multiple dietary maneuvers including eliminating dairy products, animal protein and gluten without benefit.  There is no associated abdominal pain, nausea, vomiting, anorexia or early satiety.  He has lost weight but this is attributed to changes in his diet.  He had a normal colonoscopy in 2018 and no other GI evaluation.  There is no family history of any gastrointestinal problems.  He is status post hernia repair but has had no other abdominal surgeries.  He denies significant alcohol use and is a non-smoker.  He has multiple comorbidities including coronary artery disease status post CABG 2019, hypertension and a left renal mass that was concerning for carcinoma but is being followed with serial imaging..  In addition on imaging with CT in February he was found to have calcifications in the pancreatic head though MRIs have not shown any consistent findings.     Past Medical History  Past Medical History:   Diagnosis Date    A-fib (Multi) 05/24/2023    Atrial fibrillation (Multi) 05/24/2023    Pafib post op 2019    Benign essential hypertension 05/24/2023    CAD (coronary artery disease)     HLD (hyperlipidemia)     HTN (hypertension)     Hyperlipidemia 05/24/2023    Hypertension 05/30/2023    Malignant neoplasm metastatic to bone (Multi) 11/20/2023    Paroxysmal atrial fibrillation (Multi) 05/24/2023    postop 2019       Surgical History  Past Surgical History:   Procedure Laterality Date    CORONARY ARTERY BYPASS GRAFT  11/2019    Dr. Briggs; CABG x4    HERNIA REPAIR Left 2004    Inguinal Hernia repair        Social History  He reports that he has  never smoked. He has never been exposed to tobacco smoke. He has never used smokeless tobacco. He reports that he does not currently use alcohol. He reports that he does not use drugs.    Family History  Family History   Problem Relation Name Age of Onset    Coronary artery disease Brother          Allergies  Dexamethasone, Statins-hmg-coa reductase inhibitors, and Ciprofloxacin    Last Recorded Vitals  There were no vitals taken for this visit.       Physical Exam  Vitals reviewed.   Constitutional:       Appearance: Normal appearance.   Cardiovascular:      Rate and Rhythm: Normal rate and regular rhythm.   Pulmonary:      Effort: Pulmonary effort is normal.      Breath sounds: Normal breath sounds.   Abdominal:      General: Bowel sounds are normal. There is no distension.      Palpations: Abdomen is soft. There is no mass.      Tenderness: There is no abdominal tenderness.   Neurological:      Mental Status: He is alert.           Labs  Lab Results   Component Value Date    GLUCOSE 88 08/01/2024    CALCIUM 8.9 08/01/2024     08/01/2024    K 4.7 08/01/2024    CO2 32 08/01/2024     08/01/2024    BUN 13 08/01/2024    CREATININE 0.78 08/01/2024     Lab Results   Component Value Date    WBC 4.9 08/01/2024    HGB 15.3 08/01/2024    HCT 44.5 08/01/2024    MCV 96 08/01/2024     (L) 08/01/2024     08/01/2024    K 4.7 08/01/2024     08/01/2024    CO2 32 08/01/2024    BUN 13 08/01/2024    CREATININE 0.78 08/01/2024    CALCIUM 8.9 08/01/2024    PROT 5.7 (L) 08/01/2024    BILITOT 0.8 08/01/2024    ALKPHOS 58 08/01/2024    ALT 24 08/01/2024    AST 21 08/01/2024    GLUCOSE 88 08/01/2024           Imaging     MR kidney w and wo IV contrast    Result Date: 8/16/2024  Interpreted By:  Eber Villalpando, STUDY: MR KIDNEY W AND WO IV CONTRAST;  8/15/2024 4:07 pm   INDICATION: Signs/Symptoms:Neoplasm of unspecified behavior of left kidney.   COMPARISON: MR 12/06/2023.   ACCESSION NUMBER(S): [id number]    ORDERING CLINICIAN: MARY CATALAN   TECHNIQUE: MRI abdomen; Multiplanar magnetic resonance images of the abdomen were obtained including the following sequences; T2-weighted SSFSE with and without fat saturation, T1-weighted GRE in/opposed phase, DWI, fat saturated 3D-T1w GRE pre and dynamically post contrast.  15 ML of Dotarem was administered intravenously without immediate complication.   FINDINGS: LIVER: No signal changes of steatosis. No focal lesions.   BILE DUCTS: No dilatation.   GALLBLADDER: No stone or wall thickening.   PANCREAS: Grossly unremarkable. No ductal dilatation. No pancreas divisum.   SPLEEN: Grossly unremarkable.   ADRENAL GLANDS: No dilated bowel is visualized.   KIDNEYS: Mostly endophytic mass in the upper pole of the left kidney has minimally enlarged from 21 x 21 x 21 mm to 22 x 22 x 22 mm. This demonstrates minimal central cystic change or necrosis but otherwise is mostly solid and enhancing. There is no extension beyond the capsule or into the collecting system. There is an adjacent punctate cyst also noted. Otherwise unremarkable kidneys without hydronephrosis.   LYMPH NODES: No lymphadenopathy.   ABDOMINAL VESSELS: Abdominal aorta is patent without aneurysm. The major visceral arterial branches are patent. Atherosclerosis present. Major portal venous branches are patent. IVC a and visualized major branches are patent. The renal veins appear patent without tumor thrombus.   BOWEL: No dilated bowel is visualized.   PERITONEUM/RETROPERITONEUM: No visualized free fluid.   BONES AND LOWER THORAX: Thoracolumbar degenerative changes. Grossly clear lung bases.         1.  Enhancing suspicious right renal mass has enlarged from 21 mm to 22 mm compared to 8 months ago. 2. No new metastatic disease of the abdomen identified.     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      COMPARISON:  MR 12/06/2023.      ACCESSION NUMBER(S):  [id number]      ORDERING CLINICIAN:  MARY CATALAN      TECHNIQUE:  MRI abdomen; Multiplanar magnetic resonance images of the abdomen  were obtained including the following sequences; T2-weighted SSFSE  with and without fat saturation, T1-weighted GRE in/opposed phase,  DWI, fat saturated 3D-T1w GRE pre and dynamically post contrast.  15  ML of Dotarem was administered intravenously without immediate  complication.      FINDINGS:  LIVER:  No signal changes of steatosis. No focal lesions.      BILE DUCTS:  No dilatation.      GALLBLADDER:  No stone or wall thickening.      PANCREAS:  Grossly unremarkable. No ductal dilatation. No pancreas divisum.      SPLEEN:  Grossly unremarkable.      ADRENAL GLANDS:  No dilated bowel is visualized.      KIDNEYS:  Mostly endophytic mass in the upper pole of the left kidney has  minimally enlarged from 21 x 21 x 21 mm to 22 x 22 x 22 mm. This  demonstrates minimal central cystic change or necrosis but otherwise  is mostly solid and enhancing. There is no extension beyond the  capsule or into the collecting system. There is an adjacent punctate  cyst also noted. Otherwise unremarkable kidneys without  hydronephrosis.      LYMPH NODES:  No lymphadenopathy.      ABDOMINAL VESSELS:  Abdominal aorta is patent without aneurysm. The major visceral  arterial branches are patent. Atherosclerosis present. Major portal  venous branches are patent. IVC a and visualized major branches are  patent. The renal veins appear patent without tumor thrombus.      BOWEL:  No dilated bowel is visualized.      PERITONEUM/RETROPERITONEUM:  No visualized free fluid.      BONES AND LOWER THORAX:  Thoracolumbar degenerative changes. Grossly clear lung bases.          Impression: 1.  Enhancing suspicious  right renal mass has enlarged from 21 mm to  22 mm compared to 8 months ago.  2. No new metastatic disease of the abdomen identified.          MACRO:  None      Signed by: Eber Villalpando 8/16/2024 10:10 AM  Dictation workstation:   [id number]         ASSESSMENT & PLAN  Problem List Items Addressed This Visit    None  Visit Diagnoses         Codes    Diarrhea, unspecified type    -  Primary R19.7    Relevant Orders    Celiac Panel    Calprotectin, Fecal    Pancreatic Elastase, Fecal    Ova/Para + Giardia/Cryptosporidium Antigen          At this point I would recommend evaluation for other causes of his symptoms including gluten intolerance, pancreatic disease (note the calcifications on previous imaging), and any sort of colitis.  He also apparently works in dirt a lot and is concerned about any sort of parasitic exposure.  Stool will be sent for ova and parasites, fecal calprotectin and pancreatic elastase and we will check a celiac panel.  If the above is all normal we will likely pursue a hydrogen breath test to look for bacterial overgrowth.  Unless stool testing shows otherwise I do not feel it is necessary to repeat his colonoscopy at this time.  In the interim he can try using simethicone to see if that alleviates his symptoms.    I spent 30 minutes in the professional and overall care of this patient.      Avila Bejarano MD

## 2024-09-11 NOTE — PATIENT INSTRUCTIONS
It was nice meeting you. You can try using Gas Ex for the time being. We will check stool tests for inflammation in the colon as well as parasitic infection, pancreatic problems and a blood test for gluten problems. If those are all negative we will order a breath test to look for overgrowth of bacteria which can cause these problems. I will get in touch once these tests are back to discuss the next steps.

## 2024-09-12 LAB
GLIADIN PEPTIDE IGA SER IA-ACNC: <1 U/ML
TTG IGA SER IA-ACNC: <1 U/ML

## 2024-09-14 LAB
GLIADIN PEPTIDE IGG SER IA-ACNC: <0.56 FLU (ref 0–4.99)
TTG IGG SER IA-ACNC: <0.82 FLU (ref 0–4.99)

## 2024-09-16 ENCOUNTER — LAB (OUTPATIENT)
Dept: LAB | Facility: LAB | Age: 69
End: 2024-09-16
Payer: COMMERCIAL

## 2024-09-16 DIAGNOSIS — R19.7 DIARRHEA, UNSPECIFIED TYPE: ICD-10-CM

## 2024-09-16 PROCEDURE — 82653 EL-1 FECAL QUANTITATIVE: CPT

## 2024-09-16 PROCEDURE — 83993 ASSAY FOR CALPROTECTIN FECAL: CPT

## 2024-09-16 PROCEDURE — 87328 CRYPTOSPORIDIUM AG IA: CPT

## 2024-09-16 PROCEDURE — 87329 GIARDIA AG IA: CPT

## 2024-09-19 LAB
CALPROTECTIN STL-MCNT: 7 UG/G
CRYPTOSP AG STL QL IA: NEGATIVE
ELASTASE PANC STL-MCNT: 301 UG/G
G LAMBLIA AG STL QL IA: NEGATIVE

## 2024-09-23 LAB — O+P STL MICRO: NEGATIVE

## 2024-10-30 ENCOUNTER — TELEPHONE (OUTPATIENT)
Dept: GASTROENTEROLOGY | Facility: HOSPITAL | Age: 69
End: 2024-10-30
Payer: COMMERCIAL

## 2024-10-30 ENCOUNTER — APPOINTMENT (OUTPATIENT)
Dept: HEMATOLOGY/ONCOLOGY | Facility: CLINIC | Age: 69
End: 2024-10-30
Payer: COMMERCIAL

## 2024-10-30 DIAGNOSIS — R19.7 DIARRHEA, UNSPECIFIED TYPE: Primary | ICD-10-CM

## 2024-10-31 DIAGNOSIS — R19.7 DIARRHEA, UNSPECIFIED TYPE: Primary | ICD-10-CM

## 2024-11-25 ENCOUNTER — APPOINTMENT (OUTPATIENT)
Dept: GASTROENTEROLOGY | Facility: CLINIC | Age: 69
End: 2024-11-25
Payer: COMMERCIAL

## 2024-12-12 DIAGNOSIS — I25.10 CORONARY ARTERY DISEASE INVOLVING NATIVE CORONARY ARTERY OF NATIVE HEART WITHOUT ANGINA PECTORIS: Chronic | ICD-10-CM

## 2024-12-12 DIAGNOSIS — E78.2 MIXED HYPERLIPIDEMIA: Chronic | ICD-10-CM

## 2024-12-12 RX ORDER — ROSUVASTATIN CALCIUM 10 MG/1
10 TABLET, COATED ORAL DAILY
Qty: 90 TABLET | Refills: 3 | Status: SHIPPED | OUTPATIENT
Start: 2024-12-12

## 2025-01-21 ENCOUNTER — TELEPHONE (OUTPATIENT)
Dept: CARDIOLOGY | Facility: CLINIC | Age: 70
End: 2025-01-21
Payer: MEDICARE

## 2025-01-21 DIAGNOSIS — I10 PRIMARY HYPERTENSION: Primary | ICD-10-CM

## 2025-01-21 RX ORDER — LISINOPRIL 10 MG/1
10 TABLET ORAL DAILY
Qty: 90 TABLET | Refills: 3 | Status: SHIPPED | OUTPATIENT
Start: 2025-01-21 | End: 2026-01-21

## 2025-01-21 NOTE — TELEPHONE ENCOUNTER
Spoke with patient- states activity in the Summer usually keeps his bp down. Recently he has noted numbers going up- normally systolic 120-130 but lately he has been getting readings in the 150's. Diastolic 70's   Patient attributes this to less activity in the Winter. Was taking Lisinopril 10mg daily.        Last OV 5/23/24-  3. Hypertension since losing 15 pounds his weight has come down his blood pressures have come down.  He stopped the lisinopril and is doing great without

## 2025-01-21 NOTE — TELEPHONE ENCOUNTER
Pt stopped his Lisinopril back in May 2024 but states his BP is going back up now because he is not as active now because of winter and would like to restart his BP med

## 2025-01-29 ENCOUNTER — APPOINTMENT (OUTPATIENT)
Dept: PRIMARY CARE | Facility: CLINIC | Age: 70
End: 2025-01-29
Payer: MEDICARE

## 2025-01-29 ENCOUNTER — TELEPHONE (OUTPATIENT)
Dept: PRIMARY CARE | Facility: CLINIC | Age: 70
End: 2025-01-29

## 2025-01-29 VITALS
RESPIRATION RATE: 14 BRPM | WEIGHT: 176 LBS | BODY MASS INDEX: 25.2 KG/M2 | HEART RATE: 67 BPM | HEIGHT: 70 IN | TEMPERATURE: 97.6 F | DIASTOLIC BLOOD PRESSURE: 60 MMHG | SYSTOLIC BLOOD PRESSURE: 130 MMHG | OXYGEN SATURATION: 97 %

## 2025-01-29 DIAGNOSIS — I10 BENIGN ESSENTIAL HYPERTENSION: Chronic | ICD-10-CM

## 2025-01-29 DIAGNOSIS — Z00.00 WELLNESS EXAMINATION: ICD-10-CM

## 2025-01-29 DIAGNOSIS — I48.0 PAROXYSMAL ATRIAL FIBRILLATION (MULTI): Chronic | ICD-10-CM

## 2025-01-29 DIAGNOSIS — I25.118 CORONARY ARTERY DISEASE INVOLVING NATIVE CORONARY ARTERY OF NATIVE HEART WITH OTHER FORM OF ANGINA PECTORIS: ICD-10-CM

## 2025-01-29 DIAGNOSIS — Z00.00 PERIODIC HEALTH ASSESSMENT, GENERAL SCREENING, ADULT: Primary | ICD-10-CM

## 2025-01-29 DIAGNOSIS — E78.2 MIXED HYPERLIPIDEMIA: Chronic | ICD-10-CM

## 2025-01-29 DIAGNOSIS — D47.2 MGUS (MONOCLONAL GAMMOPATHY OF UNKNOWN SIGNIFICANCE): ICD-10-CM

## 2025-01-29 PROBLEM — C79.51 MALIGNANT NEOPLASM METASTATIC TO BONE (MULTI): Chronic | Status: RESOLVED | Noted: 2023-11-20 | Resolved: 2025-01-29

## 2025-01-29 PROCEDURE — 3075F SYST BP GE 130 - 139MM HG: CPT | Performed by: INTERNAL MEDICINE

## 2025-01-29 PROCEDURE — 1036F TOBACCO NON-USER: CPT | Performed by: INTERNAL MEDICINE

## 2025-01-29 PROCEDURE — 1123F ACP DISCUSS/DSCN MKR DOCD: CPT | Performed by: INTERNAL MEDICINE

## 2025-01-29 PROCEDURE — 1170F FXNL STATUS ASSESSED: CPT | Performed by: INTERNAL MEDICINE

## 2025-01-29 PROCEDURE — 1158F ADVNC CARE PLAN TLK DOCD: CPT | Performed by: INTERNAL MEDICINE

## 2025-01-29 PROCEDURE — 3078F DIAST BP <80 MM HG: CPT | Performed by: INTERNAL MEDICINE

## 2025-01-29 PROCEDURE — 1160F RVW MEDS BY RX/DR IN RCRD: CPT | Performed by: INTERNAL MEDICINE

## 2025-01-29 PROCEDURE — G0439 PPPS, SUBSEQ VISIT: HCPCS | Performed by: INTERNAL MEDICINE

## 2025-01-29 PROCEDURE — 99397 PER PM REEVAL EST PAT 65+ YR: CPT | Performed by: INTERNAL MEDICINE

## 2025-01-29 PROCEDURE — 3008F BODY MASS INDEX DOCD: CPT | Performed by: INTERNAL MEDICINE

## 2025-01-29 PROCEDURE — 1159F MED LIST DOCD IN RCRD: CPT | Performed by: INTERNAL MEDICINE

## 2025-01-29 ASSESSMENT — ACTIVITIES OF DAILY LIVING (ADL)
DRESSING: INDEPENDENT
MANAGING_FINANCES: INDEPENDENT
BATHING: INDEPENDENT
TAKING_MEDICATION: INDEPENDENT
GROCERY_SHOPPING: INDEPENDENT
DOING_HOUSEWORK: INDEPENDENT

## 2025-01-29 ASSESSMENT — ENCOUNTER SYMPTOMS
SHORTNESS OF BREATH: 0
ABDOMINAL PAIN: 0
WHEEZING: 0
CONSTIPATION: 0
DIARRHEA: 0
COUGH: 0
NAUSEA: 0
PALPITATIONS: 0

## 2025-01-29 NOTE — PROGRESS NOTES
"Subjective   Patient ID: Prosper Mitchell is a 69 y.o. male who presents for [facility] and Medicare Annual Wellness Visit Subsequent.    Here to establish. He has no current or ongoing complaints.  He does have a complicated medical history.    He is active.  No issues with CP, SOB or dizzy spells.  No HA's.  No numbness/tingling.    He does follow with GI.      We reviewed and discussed his current medications as well as his recent blood tests.      Review of Systems   Respiratory:  Negative for cough, shortness of breath and wheezing.    Cardiovascular:  Negative for chest pain and palpitations.   Gastrointestinal:  Negative for abdominal pain, constipation, diarrhea and nausea.       Objective   /60 (BP Location: Left arm, Patient Position: Sitting, BP Cuff Size: Adult)   Pulse 67   Temp 36.4 °C (97.6 °F) (Tympanic)   Resp 14   Ht 1.778 m (5' 10\")   Wt 79.8 kg (176 lb)   SpO2 97%   BMI 25.25 kg/m²     Physical Exam  Vitals reviewed.   Constitutional:       Appearance: Normal appearance.   HENT:      Head: Normocephalic.   Eyes:      Pupils: Pupils are equal, round, and reactive to light.   Cardiovascular:      Rate and Rhythm: Normal rate and regular rhythm.   Pulmonary:      Effort: Pulmonary effort is normal.      Breath sounds: Normal breath sounds.   Musculoskeletal:         General: Normal range of motion.   Neurological:      General: No focal deficit present.      Mental Status: He is alert.   Psychiatric:         Mood and Affect: Mood normal.         Assessment/Plan   Problem List Items Addressed This Visit             ICD-10-CM    Paroxysmal atrial fibrillation (Multi) (Chronic) I48.0    Relevant Orders    Thyroid Stimulating Hormone    Benign essential hypertension (Chronic) I10    Relevant Orders    Comprehensive Metabolic Panel    Coronary artery disease involving native coronary artery of native heart with other form of angina pectoris I25.118    Relevant Orders    Lipid Panel    Hyperlipidemia " (Chronic) E78.5     Other Visit Diagnoses         Codes    Periodic health assessment, general screening, adult    -  Primary Z00.00    Relevant Orders    CBC    Comprehensive Metabolic Panel    Thyroid Stimulating Hormone    Serum Protein Electrophoresis + Immunofixation    Urine Protein Electrophoresis + Immunofixation    Referral To Hematology and Oncology    Lipid Panel    MGUS (monoclonal gammopathy of unknown significance)     D47.2    Relevant Orders    CBC    Serum Protein Electrophoresis + Immunofixation    Urine Protein Electrophoresis + Immunofixation    Referral To Hematology and Oncology    Wellness examination     Z00.00        Physical exam is unremarkable.  We reviewed and discussed all the above.  We discussed current medications as well as most recent test results.  Cardiopulmonary status is stable.  We will continue to monitor and treat accordingly.    We discussed the importance and benefits of a healthy diet that is both low in sugars and low in saturated fats.  We reviewed and discussed the benefits of regular physical exercise especially when at or above a level of 150 minutes/week.  We also discussed the importance of stress management and good sleep hygiene.  We reviewed his concerning kappa/lamda chains.  We ill refer to heme/onc and recheck these levels.  Annual Wellness Visit questions and answers were reviewed and discussed including the importance of discussing end of life wishes as well as having a living will and health care power of .     We will continue to work on lifestyle improvements and follow-up in 2-3 months, sooner if any issues should arise.

## 2025-01-31 ENCOUNTER — APPOINTMENT (OUTPATIENT)
Dept: PRIMARY CARE | Facility: CLINIC | Age: 70
End: 2025-01-31
Payer: COMMERCIAL

## 2025-02-03 ENCOUNTER — LAB (OUTPATIENT)
Dept: LAB | Facility: HOSPITAL | Age: 70
End: 2025-02-03
Payer: MEDICARE

## 2025-02-03 LAB
PROT SERPL-MCNC: 6.6 G/DL (ref 6.4–8.2)
PROT UR-ACNC: 13 MG/DL (ref 5–25)

## 2025-02-03 PROCEDURE — 84165 PROTEIN E-PHORESIS SERUM: CPT

## 2025-02-03 PROCEDURE — 86334 IMMUNOFIX E-PHORESIS SERUM: CPT

## 2025-02-03 PROCEDURE — 84155 ASSAY OF PROTEIN SERUM: CPT | Performed by: INTERNAL MEDICINE

## 2025-02-03 PROCEDURE — 86335 IMMUNFIX E-PHORSIS/URINE/CSF: CPT

## 2025-02-03 PROCEDURE — 84156 ASSAY OF PROTEIN URINE: CPT | Performed by: INTERNAL MEDICINE

## 2025-02-03 PROCEDURE — 84166 PROTEIN E-PHORESIS/URINE/CSF: CPT

## 2025-02-04 ENCOUNTER — APPOINTMENT (OUTPATIENT)
Dept: PRIMARY CARE | Facility: CLINIC | Age: 70
End: 2025-02-04
Payer: MEDICARE

## 2025-02-04 LAB
ALBUMIN SERPL-MCNC: 4.7 G/DL (ref 3.6–5.1)
ALP SERPL-CCNC: 57 U/L (ref 35–144)
ALT SERPL-CCNC: 23 U/L (ref 9–46)
ANION GAP SERPL CALCULATED.4IONS-SCNC: 11 MMOL/L (CALC) (ref 7–17)
AST SERPL-CCNC: 21 U/L (ref 10–35)
BILIRUB SERPL-MCNC: 0.8 MG/DL (ref 0.2–1.2)
BUN SERPL-MCNC: 11 MG/DL (ref 7–25)
CALCIUM SERPL-MCNC: 9.5 MG/DL (ref 8.6–10.3)
CHLORIDE SERPL-SCNC: 101 MMOL/L (ref 98–110)
CHOLEST SERPL-MCNC: 125 MG/DL
CHOLEST/HDLC SERPL: 2 (CALC)
CO2 SERPL-SCNC: 29 MMOL/L (ref 20–32)
CREAT SERPL-MCNC: 0.78 MG/DL (ref 0.7–1.35)
EGFRCR SERPLBLD CKD-EPI 2021: 97 ML/MIN/1.73M2
ERYTHROCYTE [DISTWIDTH] IN BLOOD BY AUTOMATED COUNT: 12.2 % (ref 11–15)
GLUCOSE SERPL-MCNC: 89 MG/DL (ref 65–99)
HCT VFR BLD AUTO: 46.8 % (ref 38.5–50)
HDLC SERPL-MCNC: 61 MG/DL
HGB BLD-MCNC: 15.9 G/DL (ref 13.2–17.1)
LDLC SERPL CALC-MCNC: 33 MG/DL (CALC)
MCH RBC QN AUTO: 32.4 PG (ref 27–33)
MCHC RBC AUTO-ENTMCNC: 34 G/DL (ref 32–36)
MCV RBC AUTO: 95.5 FL (ref 80–100)
NONHDLC SERPL-MCNC: 64 MG/DL (CALC)
PLATELET # BLD AUTO: 188 THOUSAND/UL (ref 140–400)
PMV BLD REES-ECKER: 11.6 FL (ref 7.5–12.5)
POTASSIUM SERPL-SCNC: 4.9 MMOL/L (ref 3.5–5.3)
PROT SERPL-MCNC: 6.4 G/DL (ref 6.1–8.1)
RBC # BLD AUTO: 4.9 MILLION/UL (ref 4.2–5.8)
SODIUM SERPL-SCNC: 141 MMOL/L (ref 135–146)
TRIGL SERPL-MCNC: 266 MG/DL
TSH SERPL-ACNC: 1.41 MIU/L (ref 0.4–4.5)
WBC # BLD AUTO: 5.3 THOUSAND/UL (ref 3.8–10.8)

## 2025-02-05 LAB
ALBUMIN MFR UR ELPH: 19.6 %
ALBUMIN: 4.6 G/DL (ref 3.4–5)
ALPHA 1 GLOBULIN: 0.2 G/DL (ref 0.2–0.6)
ALPHA 2 GLOBULIN: 0.6 G/DL (ref 0.4–1.1)
ALPHA1 GLOB MFR UR ELPH: 7.1 %
ALPHA2 GLOB MFR UR ELPH: 10.3 %
B-GLOBULIN MFR UR ELPH: 59.9 %
BETA GLOBULIN: 0.7 G/DL (ref 0.5–1.2)
GAMMA GLOB MFR UR ELPH: 3.1 %
GAMMA GLOBULIN: 0.5 G/DL (ref 0.5–1.4)
IMMUNOFIXATION COMMENT: ABNORMAL
IMMUNOFIXATION COMMENT: ABNORMAL
M-PROTEIN 1 URINE %: 41.8 %
M-PROTEIN 1: 0.1 G/DL
PATH REVIEW - SERUM IMMUNOFIXATION: ABNORMAL
PATH REVIEW - URINE IMMUNOFIXATION: ABNORMAL
PATH REVIEW-SERUM PROTEIN ELECTROPHORESIS: ABNORMAL
PATH REVIEW-URINE PROTEIN ELECTROPHORESIS: ABNORMAL
PROTEIN ELECTROPHORESIS COMMENT: ABNORMAL
URINE ELECTROPHORESIS COMMENT: ABNORMAL

## 2025-02-06 ENCOUNTER — HOSPITAL ENCOUNTER (OUTPATIENT)
Dept: RADIOLOGY | Facility: HOSPITAL | Age: 70
Discharge: HOME | End: 2025-02-06
Payer: MEDICARE

## 2025-02-06 DIAGNOSIS — D49.512 NEOPLASM OF UNSPECIFIED BEHAVIOR OF LEFT KIDNEY: ICD-10-CM

## 2025-02-06 PROCEDURE — 76770 US EXAM ABDO BACK WALL COMP: CPT

## 2025-02-13 ENCOUNTER — OFFICE VISIT (OUTPATIENT)
Dept: GASTROENTEROLOGY | Facility: CLINIC | Age: 70
End: 2025-02-13
Payer: MEDICARE

## 2025-02-13 VITALS
HEIGHT: 70 IN | WEIGHT: 177.2 LBS | DIASTOLIC BLOOD PRESSURE: 67 MMHG | TEMPERATURE: 97.6 F | SYSTOLIC BLOOD PRESSURE: 151 MMHG | BODY MASS INDEX: 25.37 KG/M2 | HEART RATE: 93 BPM

## 2025-02-13 DIAGNOSIS — R19.7 DIARRHEA, UNSPECIFIED TYPE: ICD-10-CM

## 2025-02-13 PROCEDURE — 3008F BODY MASS INDEX DOCD: CPT | Performed by: INTERNAL MEDICINE

## 2025-02-13 PROCEDURE — 3077F SYST BP >= 140 MM HG: CPT | Performed by: INTERNAL MEDICINE

## 2025-02-13 PROCEDURE — 3078F DIAST BP <80 MM HG: CPT | Performed by: INTERNAL MEDICINE

## 2025-02-13 NOTE — PROGRESS NOTES
Hydrogen Breath Analysis Consultation Sheet    Referring Provider: Avila Bejarano MD  [address]  Department [place] [address]    Indication: rule out SIBO    Symptoms: Diarrhea    Age: 69 y.o.  Weight:   Vitals:    02/13/25 1109   Weight: 80.4 kg (177 lb 3.2 oz)     Substrate: Dextrose  Dose: 75 gram    Last Meal: white bread/s teamed rice, last meal 6:30 pm 2/12/25  Recent Antibiotics: none    RESULTS:   Time PPM (H2) APPM* (CH4) CO2 Correction   Baseline #1 0818 6 8 3.9 1.41   Baseline #2 0820 5 8 4.1 1.34   *Challenge Dose Sugar: 75 g dextrose @0825  15' 0845 4 8 4.1 1.34   30' 900 4 8 3.9 1.41   45' 915 5 8 4.1 1.34   60' 930 3 8 3.7 1.48   75' 945 3 8 4.2 1.30   90' 1000 2 7 4.6 1.19   105' 1015 3 7 4.2 1.30   120' 1030 3 8 4.4 1.25   135'        150'        165'        180'          Impression: Negative for SIBO

## 2025-02-28 ENCOUNTER — OFFICE VISIT (OUTPATIENT)
Dept: GASTROENTEROLOGY | Facility: CLINIC | Age: 70
End: 2025-02-28
Payer: MEDICARE

## 2025-02-28 VITALS
BODY MASS INDEX: 25.86 KG/M2 | HEIGHT: 70 IN | HEART RATE: 67 BPM | WEIGHT: 180.6 LBS | DIASTOLIC BLOOD PRESSURE: 77 MMHG | SYSTOLIC BLOOD PRESSURE: 143 MMHG | TEMPERATURE: 98.6 F

## 2025-02-28 DIAGNOSIS — K58.0 IRRITABLE BOWEL SYNDROME WITH DIARRHEA: Primary | ICD-10-CM

## 2025-02-28 PROCEDURE — 99214 OFFICE O/P EST MOD 30 MIN: CPT | Performed by: NURSE PRACTITIONER

## 2025-02-28 PROCEDURE — 3008F BODY MASS INDEX DOCD: CPT | Performed by: NURSE PRACTITIONER

## 2025-02-28 PROCEDURE — 1159F MED LIST DOCD IN RCRD: CPT | Performed by: NURSE PRACTITIONER

## 2025-02-28 PROCEDURE — 1126F AMNT PAIN NOTED NONE PRSNT: CPT | Performed by: NURSE PRACTITIONER

## 2025-02-28 PROCEDURE — G2211 COMPLEX E/M VISIT ADD ON: HCPCS | Performed by: NURSE PRACTITIONER

## 2025-02-28 PROCEDURE — 3078F DIAST BP <80 MM HG: CPT | Performed by: NURSE PRACTITIONER

## 2025-02-28 PROCEDURE — 3077F SYST BP >= 140 MM HG: CPT | Performed by: NURSE PRACTITIONER

## 2025-02-28 ASSESSMENT — PAIN SCALES - GENERAL: PAINLEVEL_OUTOF10: 0-NO PAIN

## 2025-02-28 NOTE — PROGRESS NOTES
Prosper Mitchell is a 69 y.o. 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 Initially seen by Dr. Bejarano 9/2024 for longstanding fecal urgency with flatus. Symptoms present for many years but worsened the past several months. No nocturnal symptoms. No pain, nausea, vomiting. Eliminated dairy, animal protein, and gluten without benefit. Lost some weight but attributed this to changes in diet. He is status post hernia repair but has had no other abdominal surgeries.     2018 Colonoscopy normal.      2/2025 Celiac serologies, TSH, fecal calprotectin, pancreatic elastase all normal. CT found to have calcifications in the pancreatic head though MRIs have not shown any consistent findings.     2/2025 Breath test negative for SIBO.    Presents today for follow up. He has mushy bowel movements once a day, but sometimes will have to go back 30 minutes later to pass a small amount. He has to run to the bathroom an additional 5-6 times a day because he has urgency, lots of gas and will be afraid to have an accident. No pain but will be very uncomfortable. Fiber does not help. Does not like Imodium. Taking Pepto-Bismol as needed which helps some. No NSAIDS.    Social history: He denies significant alcohol use and is a non-smoker.     Family history: Denies family history of colon cancer or other GI disorders or malignancy.     Past Medical History:   Diagnosis Date    A-fib (Multi) 05/24/2023    Atrial fibrillation (Multi) 05/24/2023    Pafib post op 2019    Benign essential hypertension 05/24/2023    CAD (coronary artery disease)     HLD (hyperlipidemia)     HTN (hypertension)     Hyperlipidemia 05/24/2023    Hypertension 05/30/2023    Malignant neoplasm metastatic to bone (Multi) 11/20/2023    Paroxysmal atrial fibrillation (Multi) 05/24/2023    postop 2019     Current Outpatient  "Medications   Medication Sig Dispense Refill    aspirin 81 mg EC tablet Take 1 tablet (81 mg) by mouth once daily. 30 tablet 11    lisinopril 10 mg tablet Take 1 tablet (10 mg) by mouth once daily. 90 tablet 3    rosuvastatin (Crestor) 10 mg tablet TAKE 1 TABLET BY MOUTH ONCE DAILY 90 tablet 3    rifAXIMin (Xifaxan) 550 mg tablet Take 1 tablet (550 mg) by mouth 3 times a day for 14 days. 42 tablet 0     No current facility-administered medications for this visit.       Review of Systems  Review of Systems negative except as noted in HPI.    Objective     /77   Pulse 67   Temp 37 °C (98.6 °F)   Ht 1.778 m (5' 10\")   Wt 81.9 kg (180 lb 9.6 oz)   BMI 25.91 kg/m²      Physical Exam  Constitutional:  No acute distress. Normal appearance. Not ill-appearing.  HENT:  Head normocephalic and atraumatic. Conjunctivae normal.  Cardiovascular:  Normal rate. Regular rhythm.  Pulmonary:  Pulmonary effort normal. No respiratory distress. Breath sounds clear.  Abdominal:  Abdomen is flat and soft. There is no distension. No tenderness or guarding.  Skin: Dry.  Neurological:  Alert and oriented.  Psychiatric:  Mood and affect normal.    Assessment/Plan     69 y.o. 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 Celiac serologies, TSH, fecal calprotectin, pancreatic elastase all normal. CT scan reassuring and recent breath test negative for SIBO.    We will restart fiber supplement to help bull the stool and trial course of xifaxin for IBS-D. If no improvement consider testing for sucrose intolerance, possibly repeating colonoscopy.    Recommendations  Start fiber supplement (Metamucil). We would start with 1 teaspoon daily in 8 ounces of water and then after a week increase to 2 teaspoons daily and then in the third week " increase to 3 teaspoons daily (1 tablespoon) as tolerated. It is best to start at a low dose and work your way up so that you do not have side effects from the fiber supplement, which may include bloating, increased flatulence. You need to make sure you drink plenty of water when you take the fiber so we can keep the stool softer, bulkier, and easier to pass. Hopefully this would improve the consistency of stool, ease of defecation, and resolve any intermittent gas.  Start course of xifaxin for IBS-D with insurance authorization.  Follow up 8 weeks.    Electronically signed by: Amira Patton CNP on 2/28/2025 at 2:46 PM

## 2025-02-28 NOTE — PATIENT INSTRUCTIONS
Recommendations  Start fiber supplement (Metamucil). We would start with 1 teaspoon daily in 8 ounces of water and then after a week increase to 2 teaspoons daily and then in the third week increase to 3 teaspoons daily (1 tablespoon) as tolerated. It is best to start at a low dose and work your way up so that you do not have side effects from the fiber supplement, which may include bloating, increased flatulence. You need to make sure you drink plenty of water when you take the fiber so we can keep the stool softer, bulkier, and easier to pass. Hopefully this would improve the consistency of stool, ease of defecation, and resolve any intermittent gas.  Start course of xifaxin for IBS-D with insurance authorization.  Follow up 8 weeks.

## 2025-03-03 ENCOUNTER — TELEPHONE (OUTPATIENT)
Dept: HEMATOLOGY/ONCOLOGY | Facility: CLINIC | Age: 70
End: 2025-03-03
Payer: MEDICARE

## 2025-03-03 NOTE — TELEPHONE ENCOUNTER
After Visit Summary   10/2/2017    Thomas Davila    MRN: [id number]           Patient Information     Date Of Birth          1947        Visit Information        Provider Department      10/2/2017 1:30 PM Alissa Chacon MD [facility]        Today's Diagnoses     Endometrial adenocarcinoma (H)    -  1    Fatty liver        Personal history of DVT (deep vein thrombosis)        Overweight          Care Instructions    6 months f/u with labs.          Follow-ups after your visit        Your next 10 appointments already scheduled     Nov 06, 2017  7:45 AM CST   [facility] with Walt Golden MD   [facility]    [address] [address]   [phone]            Mar 28, 2018  1:20 PM CDT   LAB with [facility] ([facility])    [address] [address]   [phone]           Patient must bring picture ID. Patient should be prepared to give a urine specimen  Please do not eat 10-12 hours before your appointment if you are coming in fasting for labs on lipids, cholesterol, or glucose (sugar). Pregnant women should follow their Care Team instructions. Water with medications is okay. Do not drink coffee or other fluids. If you have concerns about taking  your medications, please ask at office or if scheduling via Elecar, send a message by clicking on Secure Messaging, Message Your Care Team.            Apr 02, 2018  1:45 PM CDT   Return Visit with Alissa Chacon MD   [facility] ([facility])    [facility]  [address]  [facility] [address]   [phone]              Future tests that were ordered for you today     Open Future Orders        Priority Expected Expires Ordered    Comprehensive metabolic panel Routine 4/1/2018 5/31/2018 10/2/2017    CBC with platelets differential Routine 4/1/2018 5/31/2018 10/2/2017     I called and spoke with Prosper regarding his NPV with Dr. Hanley scheduled for this Thursday 03/06. I did let him know that I spoke with our assistance nurse manager and we do need approval from our manager for a transfer of care from Dr. Villareal to Dr. Hanley. Prosper verbalized understanding. He is aware that at this time we will have to cancel this appointments until we receive an okay for transfer.  Prosper verbalized understanding and was appreciative of the call and information.He knows that we will be in touch next week regarding next steps.   " Routine 2018 2018 10/2/2017            Who to contact     If you have questions or need follow up information about today's clinic visit or your schedule please contact [facility] directly at [phone].  Normal or non-critical lab and imaging results will be communicated to you by MyChart, letter or phone within 4 business days after the clinic has received the results. If you do not hear from us within 7 days, please contact the clinic through [employer] or phone. If you have a critical or abnormal lab result, we will notify you by phone as soon as possible.  Submit refill requests through [employer] or call your pharmacy and they will forward the refill request to us. Please allow 3 business days for your refill to be completed.          Additional Information About Your Visit        XcerionharMonths Of Me Information     [employer]markets lets you send messages to your doctor, view your test results, renew your prescriptions, schedule appointments and more. To sign up, go to [email or web address] . Click on \"Log in\" on the left side of the screen, which will take you to the Welcome page. Then click on \"Sign up Now\" on the right side of the page.     You will be asked to enter the access code listed below, as well as some personal information. Please follow the directions to create your username and password.     Your access code is: [email or web address]  Expires: 2017  1:56 PM     Your access code will  in 90 days. If you need help or a new code, please call your [place] clinic or [phone].        Care EveryWhere ID     This is your Care EveryWhere ID. This could be used by other organizations to access your [place] medical records  [id number]        Your Vitals Were     Pulse Temperature Respirations Height Pulse Oximetry Breastfeeding?    106 97.9  F (36.6  C) (Tympanic) 18 1.632 m (5' 4.25\") 95% No    BMI (Body Mass Index)                   41.54 kg/m2            Blood Pressure from Last 3 " Encounters:   10/02/17 134/78   04/10/17 151/75   04/03/17 135/73    Weight from Last 3 Encounters:   10/02/17 110.6 kg (243 lb 14.4 oz)   04/03/17 109.2 kg (240 lb 11.2 oz)   01/30/17 110.2 kg (243 lb)               Primary Care Provider Office Phone # Fax #    Dung Prieto [phone] [address] [address]        Equal Access to Services     GOOD SALDAÑA : Hadii florence ku hadasho Soomaali, waaxda luqadaha, qaybta kaalmada adeegyada, perez connolly hayyeimy martinez . So [facility] [phone].    ATENCIÓN: Si habla español, tiene a schneider disposición servicios gratuitos de asistencia lingüística. [facility] [phone].    We comply with applicable federal civil rights laws and [place] laws. We do not discriminate on the basis of race, color, national origin, age, disability, sex, sexual orientation, or gender identity.            Thank you!     Thank you for choosing [facility]  for your care. Our goal is always to provide you with excellent care. Hearing back from our patients is one way we can continue to improve our services. Please take a few minutes to complete the written survey that you may receive in the mail after your visit with us. Thank you!             Your Updated Medication List - Protect others around you: Learn how to safely use, store and throw away your medicines at www.disposemymeds.org.          This list is accurate as of: 10/2/17  1:50 PM.  Always use your most recent med list.                   Brand Name Dispense Instructions for use Diagnosis    ACETAMINOPHEN PO      Take 500 mg by mouth as needed for pain        aspirin 81 MG tablet      Take by mouth daily        lisinopril 20 MG tablet    PRINIVIL/ZESTRIL    90 tablet    Take 1 tablet (20 mg) by mouth daily    Essential hypertension       naproxen 500 MG tablet    NAPROSYN    60 tablet    Take 1 tablet (500 mg) by mouth 2 times daily as needed for moderate pain    Primary  osteoarthritis involving multiple joints       omeprazole 20 MG CR capsule    priLOSEC     TAKE ONE CAPSULE BY MOUTH DAILY BEFORE A MEAL        * order for DME     1 Device    Medium Tri Tracy    Right foot pain, Posterior tibialis muscle dysfunction       * order for DME     1 Device    Equipment being ordered: Gell Heel inserts    Plantar fasciitis, left       pimecrolimus 1 % cream    ELIDEL    30 g    Apply topically 2 times daily    Contact dermatitis of eyelid, right, 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 Read the directions carefully, and ask your doctor or other care provider to review them with you.

## 2025-03-06 ENCOUNTER — APPOINTMENT (OUTPATIENT)
Dept: HEMATOLOGY/ONCOLOGY | Facility: CLINIC | Age: 70
End: 2025-03-06
Payer: MEDICARE

## 2025-03-17 ENCOUNTER — LAB (OUTPATIENT)
Dept: LAB | Facility: HOSPITAL | Age: 70
End: 2025-03-17
Payer: MEDICARE

## 2025-03-17 DIAGNOSIS — N28.89 LEFT KIDNEY MASS: ICD-10-CM

## 2025-03-17 DIAGNOSIS — N28.89 OTHER SPECIFIED DISORDERS OF KIDNEY AND URETER: Primary | ICD-10-CM

## 2025-03-17 DIAGNOSIS — C79.51 MALIGNANT NEOPLASM METASTATIC TO BONE (MULTI): ICD-10-CM

## 2025-03-17 LAB
ALBUMIN SERPL BCP-MCNC: 4.4 G/DL (ref 3.4–5)
ALP SERPL-CCNC: 48 U/L (ref 33–136)
ALT SERPL W P-5'-P-CCNC: 20 U/L (ref 10–52)
ANION GAP SERPL CALC-SCNC: 9 MMOL/L (ref 10–20)
AST SERPL W P-5'-P-CCNC: 21 U/L (ref 9–39)
BASOPHILS # BLD AUTO: 0.03 X10*3/UL (ref 0–0.1)
BASOPHILS NFR BLD AUTO: 0.6 %
BILIRUB SERPL-MCNC: 0.8 MG/DL (ref 0–1.2)
BUN SERPL-MCNC: 13 MG/DL (ref 6–23)
CALCIUM SERPL-MCNC: 9.3 MG/DL (ref 8.6–10.6)
CHLORIDE SERPL-SCNC: 103 MMOL/L (ref 98–107)
CO2 SERPL-SCNC: 33 MMOL/L (ref 21–32)
CREAT SERPL-MCNC: 0.75 MG/DL (ref 0.5–1.3)
EGFRCR SERPLBLD CKD-EPI 2021: >90 ML/MIN/1.73M*2
EOSINOPHIL # BLD AUTO: 0.15 X10*3/UL (ref 0–0.7)
EOSINOPHIL NFR BLD AUTO: 3.1 %
ERYTHROCYTE [DISTWIDTH] IN BLOOD BY AUTOMATED COUNT: 12.4 % (ref 11.5–14.5)
GLUCOSE SERPL-MCNC: 63 MG/DL (ref 74–99)
HCT VFR BLD AUTO: 46 % (ref 41–52)
HGB BLD-MCNC: 15.3 G/DL (ref 13.5–17.5)
IMM GRANULOCYTES # BLD AUTO: 0 X10*3/UL (ref 0–0.7)
IMM GRANULOCYTES NFR BLD AUTO: 0 % (ref 0–0.9)
LYMPHOCYTES # BLD AUTO: 1.37 X10*3/UL (ref 1.2–4.8)
LYMPHOCYTES NFR BLD AUTO: 28.1 %
MCH RBC QN AUTO: 33 PG (ref 26–34)
MCHC RBC AUTO-ENTMCNC: 33.3 G/DL (ref 32–36)
MCV RBC AUTO: 99 FL (ref 80–100)
MONOCYTES # BLD AUTO: 0.5 X10*3/UL (ref 0.1–1)
MONOCYTES NFR BLD AUTO: 10.3 %
NEUTROPHILS # BLD AUTO: 2.82 X10*3/UL (ref 1.2–7.7)
NEUTROPHILS NFR BLD AUTO: 57.9 %
NRBC BLD-RTO: 0 /100 WBCS (ref 0–0)
PLATELET # BLD AUTO: 171 X10*3/UL (ref 150–450)
POTASSIUM SERPL-SCNC: 4.5 MMOL/L (ref 3.5–5.3)
PROT SERPL-MCNC: 5.9 G/DL (ref 6.4–8.2)
PROT SERPL-MCNC: 5.9 G/DL (ref 6.4–8.2)
RBC # BLD AUTO: 4.63 X10*6/UL (ref 4.5–5.9)
SODIUM SERPL-SCNC: 140 MMOL/L (ref 136–145)
WBC # BLD AUTO: 4.9 X10*3/UL (ref 4.4–11.3)

## 2025-03-17 PROCEDURE — 80053 COMPREHEN METABOLIC PANEL: CPT

## 2025-03-17 PROCEDURE — 36415 COLL VENOUS BLD VENIPUNCTURE: CPT

## 2025-03-17 PROCEDURE — 84155 ASSAY OF PROTEIN SERUM: CPT

## 2025-03-17 PROCEDURE — 84165 PROTEIN E-PHORESIS SERUM: CPT

## 2025-03-17 PROCEDURE — 86334 IMMUNOFIX E-PHORESIS SERUM: CPT

## 2025-03-17 PROCEDURE — 85025 COMPLETE CBC W/AUTO DIFF WBC: CPT

## 2025-03-17 PROCEDURE — 83521 IG LIGHT CHAINS FREE EACH: CPT

## 2025-03-18 LAB
KAPPA LC SERPL-MCNC: 0.92 MG/DL (ref 0.33–1.94)
KAPPA LC/LAMBDA SER: 0.02 {RATIO} (ref 0.26–1.65)
LAMBDA LC SERPL-MCNC: 43.15 MG/DL (ref 0.57–2.63)

## 2025-03-20 LAB
ALBUMIN: 4.2 G/DL (ref 3.4–5)
ALPHA 1 GLOBULIN: 0.2 G/DL (ref 0.2–0.6)
ALPHA 2 GLOBULIN: 0.5 G/DL (ref 0.4–1.1)
BETA GLOBULIN: 0.6 G/DL (ref 0.5–1.2)
GAMMA GLOBULIN: 0.4 G/DL (ref 0.5–1.4)
IMMUNOFIXATION COMMENT: ABNORMAL
M-PROTEIN 1: 0 G/DL
PATH REVIEW - SERUM IMMUNOFIXATION: ABNORMAL
PATH REVIEW-SERUM PROTEIN ELECTROPHORESIS: ABNORMAL
PROTEIN ELECTROPHORESIS COMMENT: ABNORMAL

## 2025-03-29 NOTE — PROGRESS NOTES
Patient ID: Prosper Mitchell is a 69 y.o. male.  Referring Physician: Tree Holden DO  [address] [place] [address]  Primary Care Provider: Tree Holden DO      Subjective    HPI  Prosper Mitchell is a 69 y.o. male presenting for initial consultation at the request of Dr. Tree Holden for MGUS. Most recent blood work on 3/17/25 shows no M protein, prev 0.1 g/dL of monoclonal free lambda light chains. Lambda light chains elevated ratio 43 to 0.92 of Kappa. No anemia. Urine protein shows 41.8% of total urine protein. Kidney functions is normal.     Presents with his wife today. He has no major issues today. He notes a swelling over the left shoulder blade, not changing in size, no associated pain or overlying skin changes. He denies fever, unintentional weight loss, night sweats, difficulty urinating, pressure in the chest, chest pain, lower limb edema, rashes.     Patient's past medical history, surgical history, family history and social history reviewed.    Family history:  No family history of blood cancers  Brother - heart disease, had bypass at age 38 years      Review of Systems - Oncology   Review of Systems:    Positive per HPI, otherwise negative.     Objective   BSA: 2.01 meters squared  /78 (BP Location: Right arm, Patient Position: Sitting)   Pulse 65   Temp 36.6 °C (97.9 °F) (Temporal)   Resp 17   Wt 81.8 kg (180 lb 5.4 oz)   SpO2 98%   BMI 25.88 kg/m²     Family History   Problem Relation Name Age of Onset    Coronary artery disease Brother       Oncology History    No history exists.       Prosper Mitchell  reports that he has never smoked. He has never been exposed to tobacco smoke. He has never used smokeless tobacco.  He  reports that he does not currently use alcohol.  He  reports no history of drug use.    Physical Exam  Gen: appears well in clinic, NAD  HEENT: atraumatic head, normocephalic, EOMI, conjunctiva normal  LUNG: no increased WOB, CTAB  CV: No JVD.  RRR  GI: soft, NT, ND  LE: no LE edema  Skin: no obvious rashes or lesions on visible skin  Neuro: interactive, no focal deficits noted  Psych: normal mood and affect      Performance Status:  Symptomatic; fully ambulatory    Labs/Imaging/Pathology: Personally reviewed reports and images in Epic electronic medical record system. Pertinent results as it related to the plan represented in below in assessment and plan.     Component      Latest Ref Rng 2/3/2025   Albumin      3.4 - 5.0 g/dL 4.6    Alpha 1 Globulin      0.2 - 0.6 g/dL 0.2    Alpha 2 Globulin      0.4 - 1.1 g/dL 0.6    Beta Globulin      0.5 - 1.2 g/dL 0.7    Gamma      0.5 - 1.4 g/dL 0.5    M-PROTEIN 1        g/dL 0.1 (H)    Protein Electrophoresis Comment Aberrant band detected. See immunofixation.    Immunofixation Comment 2/3/25   Monoclonal free lambda light chains at 41.8% of total urine protein.…    Immunofixation Comment 2/3/25   Known monoclonal free lambda light chains in the gamma region at 0.1 g/dL. …    Path Review - Serum Protein Electrophoresis  Reviewed and approved by EMILIE BEAN on 2/5/25 at 8:25 PM.    Path Review - Serum Immunofixation Reviewed and approved by EMILIE BEAN on 2/5/25 at 8:25 PM.      Component      Latest Ref Rng 3/17/2025   Albumin      3.4 - 5.0 g/dL 4.2    Alpha 1 Globulin      0.2 - 0.6 g/dL 0.2    Alpha 2 Globulin      0.4 - 1.1 g/dL 0.5    Beta Globulin      0.5 - 1.2 g/dL 0.6    Gamma      0.5 - 1.4 g/dL 0.4 (L)    M-PROTEIN 1        g/dL 0.0    Protein Electrophoresis Comment Aberrant band detected. See immunofixation.    Immunofixation Comment 3/17/25 Known monoclonal free lambda light chains in the gamma region at a level too low to quantitate. Last detected on 2/3/25 at 0.1 g/dL.    Path Review - Serum Protein Electrophoresis  Reviewed and approved by SHELBI MILLS on 3/20/25 at 8:55 AM.    Path Review - Serum Immunofixation Reviewed and approved by SHELBI MILLS on 3/20/25 at 8:55 AM.        Legend:  (H) High  (L) Low    Component      Latest Ref Rng 4/18/2024 3/17/2025   Ig [place] Free Light Chain      0.33 - 1.94 mg/dL 0.78  0.92    Ig Lambda Free Light Chain      0.57 - 2.63 mg/dL 40.08 (H)  43.15 (H)    Kappa/Lambda Ratio      0.26 - 1.65  0.02 (L)  0.02 (L)       Legend:  (H) High  (L) Low    Component      Latest Ref Rng 2/3/2025   Immunofixation Comment 2/3/25   Monoclonal free lambda light chains at 41.8% of total urine protein.…    Immunofixation Comment 2/3/25   Known monoclonal free lambda light chains in the gamma region at 0.1 g/dL. …    Albumin %      % 19.6    Alpha 1 Globulin %      % 7.1    Alpha 2 Globulin %      % 10.3    Beta Globulin %      % 59.9    Gamma Globulin %      % 3.1    M-Protein 1 Urine %        % 41.8 (H)    Urine Electrophoresis Comment Aberrant band detected. See immunofixation.    Path Review-Urine Protein Electrophoresis  Reviewed and approved by EMILIE BEAN on 2/5/25 at 8:08 PM.    Path Review - Urine Immunofixation Reviewed and approved by EMILIE BEAN on 2/5/25 at 8:08 PM.       Legend:  (H) High    Assessment/Plan    Lambda light chain MGUS   - Initially presented to oncology with syncopy 11/20/23 to Dr. Villareal   - He was found to have an incidental left kidney mass along with bone lesions during hospitalization for syncope   - CT angio CAP, 11/13/23 showed a 2.2 x 2.2 cm enhancing lesion in the left kidney   - CT lumbar spine on the same day, 11/13/23 showed a right sacral lesion   - On 12/1/23, underwent a bone scan which showed left posterior pelvic region radiotracer and right posterior sacral iliac joint suspicious for metastatic deposit   - Repeat CT CAP on 2/1/24 showed left kidney lesion unchanged and several sub centimeter foci in bilateral iliac bones   - MRI kidney, 8/15/24, showed left renal mass enlarged from 21 mm to 22 mm   - Since a year ago, lambda light chains have been 40-43, kappa in the normal range 0.02   - Given elevation in lambda  light chains, I do want to evaluate further with a fat pad biopsy to evaluate for amyloid   - Will check PET CT to evaluate for persistent lytic lesions and concern for plasma cell dyscrasia   - RTC in 3 weeks to discuss further      RTC in 3 weeks  This note has been transcribed using a medical scribe and there is a possibility of unintentional typing misprints    Diagnoses and all orders for this visit:  MGUS (monoclonal gammopathy of unknown significance)  -     Referral To Hematology and Oncology  -     IR biopsy abdomen; Future  -     NM PET CT bone skull base to mid thigh; Future  -     24 hr Urine Protein Electrophoresis + Immunofixation; Future  -     Clinic Appointment Request; Future  Periodic health assessment, general screening, adult  -     Referral To Hematology and Oncology      Jacqui Hanley MD  Hematology/Oncology  [facility]      Scribe Attestation  By signing my name below, I, Dveaughn Padilla, attest that this documentation has been prepared under the direction and in the presence of Jacqui Hanley MD.    Time Spent  Prep time on day of patient encounter: 5 minutes  Time spent directly with patient, family or caregiver: 25 minutes  Additional Time Spent on Patient Care Activities: 5 minutes  Documentation Time: 5 minutes  Other Time Spent: 0 minutes  Total: 40 minutes

## 2025-04-01 ENCOUNTER — OFFICE VISIT (OUTPATIENT)
Dept: HEMATOLOGY/ONCOLOGY | Facility: CLINIC | Age: 70
End: 2025-04-01
Payer: MEDICARE

## 2025-04-01 VITALS
BODY MASS INDEX: 25.88 KG/M2 | TEMPERATURE: 97.9 F | DIASTOLIC BLOOD PRESSURE: 78 MMHG | RESPIRATION RATE: 17 BRPM | HEART RATE: 65 BPM | WEIGHT: 180.34 LBS | SYSTOLIC BLOOD PRESSURE: 144 MMHG | OXYGEN SATURATION: 98 %

## 2025-04-01 DIAGNOSIS — Z00.00 PERIODIC HEALTH ASSESSMENT, GENERAL SCREENING, ADULT: ICD-10-CM

## 2025-04-01 DIAGNOSIS — D47.2 MGUS (MONOCLONAL GAMMOPATHY OF UNKNOWN SIGNIFICANCE): ICD-10-CM

## 2025-04-01 PROCEDURE — 1126F AMNT PAIN NOTED NONE PRSNT: CPT | Performed by: INTERNAL MEDICINE

## 2025-04-01 PROCEDURE — 99215 OFFICE O/P EST HI 40 MIN: CPT | Performed by: INTERNAL MEDICINE

## 2025-04-01 PROCEDURE — 3077F SYST BP >= 140 MM HG: CPT | Performed by: INTERNAL MEDICINE

## 2025-04-01 PROCEDURE — 3078F DIAST BP <80 MM HG: CPT | Performed by: INTERNAL MEDICINE

## 2025-04-01 PROCEDURE — 1159F MED LIST DOCD IN RCRD: CPT | Performed by: INTERNAL MEDICINE

## 2025-04-01 ASSESSMENT — PATIENT HEALTH QUESTIONNAIRE - PHQ9
1. LITTLE INTEREST OR PLEASURE IN DOING THINGS: NOT AT ALL
SUM OF ALL RESPONSES TO PHQ9 QUESTIONS 1 AND 2: 0
2. FEELING DOWN, DEPRESSED OR HOPELESS: NOT AT ALL

## 2025-04-01 ASSESSMENT — ENCOUNTER SYMPTOMS
DEPRESSION: 0
OCCASIONAL FEELINGS OF UNSTEADINESS: 0
LOSS OF SENSATION IN FEET: 0

## 2025-04-01 ASSESSMENT — PAIN SCALES - GENERAL: PAINLEVEL_OUTOF10: 0-NO PAIN

## 2025-04-07 ENCOUNTER — LAB (OUTPATIENT)
Dept: LAB | Facility: CLINIC | Age: 70
End: 2025-04-07
Payer: MEDICARE

## 2025-04-07 DIAGNOSIS — D47.2 MGUS (MONOCLONAL GAMMOPATHY OF UNKNOWN SIGNIFICANCE): ICD-10-CM

## 2025-04-07 PROCEDURE — 84166 PROTEIN E-PHORESIS/URINE/CSF: CPT | Performed by: STUDENT IN AN ORGANIZED HEALTH CARE EDUCATION/TRAINING PROGRAM

## 2025-04-07 PROCEDURE — 86335 IMMUNFIX E-PHORSIS/URINE/CSF: CPT

## 2025-04-07 PROCEDURE — 86325 OTHER IMMUNOELECTROPHORESIS: CPT | Performed by: STUDENT IN AN ORGANIZED HEALTH CARE EDUCATION/TRAINING PROGRAM

## 2025-04-07 PROCEDURE — 84156 ASSAY OF PROTEIN URINE: CPT

## 2025-04-08 LAB
COLLECT DURATION TIME SPEC: 24 HRS
PROT 24H UR-MCNC: 5 MG/DL (ref 5–25)
SPECIMEN VOL 24H UR: 2500 ML
TOTAL PROTEIN (MG/24HR) IN 24 HOUR URINE UPE: 125 MG/24H

## 2025-04-09 LAB
ALBUMIN MFR UR ELPH: 16.9 %
ALPHA1 GLOB MFR UR ELPH: 5.2 %
ALPHA2 GLOB MFR UR ELPH: 9.4 %
B-GLOBULIN MFR UR ELPH: 61.5 %
GAMMA GLOB MFR UR ELPH: 7 %
IMMUNOFIXATION COMMENT: ABNORMAL
M-PROTEIN 1 URINE %: 38.7 %
M-PROTEIN 1 URINE PER 24HR: 48.4 MG/24HR
PATH REVIEW - URINE IMMUNOFIXATION: ABNORMAL
PATH REVIEW-URINE PROTEIN ELECTROPHORESIS: ABNORMAL
URINE ELECTROPHORESIS COMMENT: ABNORMAL

## 2025-04-16 ENCOUNTER — HOSPITAL ENCOUNTER (OUTPATIENT)
Dept: RADIOLOGY | Facility: CLINIC | Age: 70
Discharge: HOME | End: 2025-04-16
Payer: MEDICARE

## 2025-04-16 DIAGNOSIS — D47.2 MGUS (MONOCLONAL GAMMOPATHY OF UNKNOWN SIGNIFICANCE): ICD-10-CM

## 2025-04-16 PROCEDURE — 78815 PET IMAGE W/CT SKULL-THIGH: CPT | Mod: PI

## 2025-04-16 PROCEDURE — 3430000001 HC RX 343 DIAGNOSTIC RADIOPHARMACEUTICALS: Performed by: INTERNAL MEDICINE

## 2025-04-16 PROCEDURE — A9552 F18 FDG: HCPCS | Performed by: INTERNAL MEDICINE

## 2025-04-16 RX ORDER — FLUDEOXYGLUCOSE F 18 200 MCI/ML
12.7 INJECTION, SOLUTION INTRAVENOUS
Status: COMPLETED | OUTPATIENT
Start: 2025-04-16 | End: 2025-04-16

## 2025-04-16 RX ADMIN — FLUDEOXYGLUCOSE F 18 12.7 MILLICURIE: 200 INJECTION, SOLUTION INTRAVENOUS at 09:22

## 2025-04-17 NOTE — PROGRESS NOTES
Prosper Mitchell is a 69 y.o. 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 Initially seen by Dr. Bejarano 9/2024 for longstanding fecal urgency with flatus. Symptoms present for many years but worsened the past several months. No nocturnal symptoms. No pain, nausea, vomiting. Eliminated dairy, animal protein, and gluten without benefit. Lost some weight but attributed this to changes in diet. He is status post hernia repair but has had no other abdominal surgeries.      2018 Colonoscopy normal.       2/2025 Celiac serologies, TSH, fecal calprotectin, pancreatic elastase all normal. CT found to have calcifications in the pancreatic head though MRIs have not shown any consistent findings.      2/2025 Breath test negative for SIBO.     2/2025. He has mushy bowel movements once a day, but sometimes will have to go back 30 minutes later to pass a small amount. He has to run to the bathroom an additional 5-6 times a day because he has urgency, lots of gas and will be afraid to have an accident. No pain but will be very uncomfortable. Fiber does not help. Does not like Imodium. Taking Pepto-Bismol as needed which helps some. No NSAIDS.    We will restart fiber supplement to help bull the stool and trial course of xifaxin for IBS-D. If no improvement consider testing for sucrose intolerance, possibly repeating colonoscopy.    Presents today for follow up.     Social history: He denies significant alcohol use and is a non-smoker.      Family history: Denies family history of colon cancer or other GI disorders or malignancy.     Medical History[1]  Surgical History[2]  Current Medications[3]      Review of Systems  Review of Systems negative except as noted in HPI.    Objective     There were no vitals taken for this visit.     Physical Exam  Constitutional:  No acute  distress. Normal appearance. Not ill-appearing.  HENT:  Head normocephalic and atraumatic. Conjunctivae normal.  Cardiovascular:  Normal rate. Regular rhythm.  Pulmonary:  Pulmonary effort normal. No respiratory distress. Breath sounds clear.  Abdominal:  Abdomen is flat and soft. There is no distension. No tenderness or guarding.  Skin: Dry.  Neurological:  Alert and oriented.  Psychiatric:  Mood and affect normal.    Assessment/Plan     69 y.o. 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 Celiac serologies, TSH, fecal calprotectin, pancreatic elastase all normal. CT scan reassuring and recent breath test negative for SIBO.     We will restart fiber supplement to help bull the stool and trial course of xifaxin for IBS-D. If no improvement consider testing for sucrose intolerance, possibly repeating colonoscopy.     Recommendations  Start fiber supplement (Metamucil). We would start with 1 teaspoon daily in 8 ounces of water and then after a week increase to 2 teaspoons daily and then in the third week increase to 3 teaspoons daily (1 tablespoon) as tolerated. It is best to start at a low dose and work your way up so that you do not have side effects from the fiber supplement, which may include bloating, increased flatulence. You need to make sure you drink plenty of water when you take the fiber so we can keep the stool softer, bulkier, and easier to pass. Hopefully this would improve the consistency of stool, ease of defecation, and resolve any intermittent gas.  Start course of xifaxin for IBS-D with insurance authorization.  Follow up 8 weeks.    Electronically signed by: Amira Patton CNP on 4/17/2025 at 12:42 PM           [1]   Past Medical History:  Diagnosis Date    A-fib (Multi) 05/24/2023    Atrial  fibrillation (Multi) 05/24/2023    Pafib post op 2019    Benign essential hypertension 05/24/2023    CAD (coronary artery disease)     HLD (hyperlipidemia)     HTN (hypertension)     Hyperlipidemia 05/24/2023    Hypertension 05/30/2023    Malignant neoplasm metastatic to bone (Multi) 11/20/2023    Paroxysmal atrial fibrillation (Multi) 05/24/2023    postop 2019   [2]   Past Surgical History:  Procedure Laterality Date    CORONARY ARTERY BYPASS GRAFT  11/2019    Dr. Briggs; CABG x4    HERNIA REPAIR Left 2004    Inguinal Hernia repair   [3]   Current Outpatient Medications   Medication Sig Dispense Refill    aspirin 81 mg EC tablet Take 1 tablet (81 mg) by mouth once daily. 30 tablet 11    lisinopril 10 mg tablet Take 1 tablet (10 mg) by mouth once daily. 90 tablet 3    rosuvastatin (Crestor) 10 mg tablet TAKE 1 TABLET BY MOUTH ONCE DAILY 90 tablet 3     No current facility-administered medications for this visit.

## 2025-04-23 ENCOUNTER — APPOINTMENT (OUTPATIENT)
Dept: RADIOLOGY | Facility: HOSPITAL | Age: 70
End: 2025-04-23
Payer: MEDICARE

## 2025-04-25 ENCOUNTER — APPOINTMENT (OUTPATIENT)
Dept: GASTROENTEROLOGY | Facility: CLINIC | Age: 70
End: 2025-04-25
Payer: MEDICARE

## 2025-04-30 ENCOUNTER — APPOINTMENT (OUTPATIENT)
Dept: PRIMARY CARE | Facility: CLINIC | Age: 70
End: 2025-04-30
Payer: MEDICARE

## 2025-04-30 NOTE — PROGRESS NOTES
Patient ID: Prosper Mitchell is a 69 y.o. male.      Subjective    HPI  Prosper Mitchell is a 69 y.o. male presenting for follow up of MGUS. PET CT on 4/16/25, no FDG avid sclerotic lesions, no other extramedullary disease. 24 hour urine does show an M protein 48.4.     He is doing well today. He was unable to do fat pad biopsy. He denies new bone pain, issues urinating.     He does have two brothers who have had early heart disease, age 36 years for one.     Patient's past medical history, surgical history, family history and social history reviewed.    Review of Systems:   Review of Systems:    Positive per HPI, otherwise negative.       Objective   BSA: 1.98 meters squared  /77 (BP Location: Right arm, Patient Position: Sitting)   Pulse 70   Temp 37 °C (98.6 °F) (Temporal)   Resp 16   Wt 79.5 kg (175 lb 4.3 oz)   SpO2 96%   BMI 25.15 kg/m²       Physical Exam  Gen: appears well in clinic, NAD  HEENT: atraumatic head, normocephalic, EOMI, conjunctiva normal  LUNG: no increased WOB, CTAB  CV: No JVD. RRR  GI: soft, NT, ND  LE: no LE edema  Skin: no obvious rashes or lesions on visible skin  Neuro: interactive, no focal deficits noted  Psych: normal mood and affect      Performance Status:  Symptomatic; fully ambulatory    Labs/Imaging/Pathology: Personally reviewed reports and images in Epic electronic medical record system. Pertinent results as it related to the plan represented in below in assessment and plan.     Component      Latest Ref Rng 4/7/2025   Immunofixation Comment 4/7/25 Known monoclonal free lambda light chains at 48.4 mg/24 hours. …    Albumin %      % 16.9    Alpha 1 Globulin %      % 5.2    Alpha 2 Globulin %      % 9.4    Beta Globulin %      % 61.5    Gamma Globulin %      % 7.0    M-Protein 1 Urine %        % 38.7 (H)    Urine Electrophoresis Comment Aberrant band detected. See immunofixation.    Path Review-Urine Protein Electrophoresis  Reviewed and approved by GENEVA  EMILEI on 4/9/25 at 9:24 PM.    Path Review - Urine Immunofixation Reviewed and approved by EMILIE BEAN on 4/9/25 at 9:25 PM.    M-Protein 1 Urine Per 24hr        mg/24hr 48.4 (H)       Legend:  (H) High    Assessment/Plan    Lambda light chain MGUS   - Initially presented to oncology with syncopy 11/20/23 to Dr. Villareal   - He was found to have an incidental left kidney mass along with bone lesions during hospitalization for syncope   - CT angio CAP, 11/13/23 showed a 2.2 x 2.2 cm enhancing lesion in the left kidney   - CT lumbar spine on the same day, 11/13/23 showed a right sacral lesion   - On 12/1/23, underwent a bone scan which showed left posterior pelvic region radiotracer and right posterior sacral iliac joint suspicious for metastatic deposit   - Repeat CT CAP on 2/1/24 showed left kidney lesion unchanged and several sub centimeter foci in bilateral iliac bones   - MRI kidney, 8/15/24, showed left renal mass enlarged from 21 mm to 22 mm   - Since a year ago, lambda light chains have been 40-43, kappa in the normal range 0.02   - Given elevation in lambda light chains, I do want to evaluate further with a fat pad biopsy to evaluate for amyloid   - Will check PET CT to evaluate for persistent lytic lesions and concern for plasma cell dyscrasia   - RTC in 3 weeks to discuss further    5/1/25:   - kappa/lambda light chains stable for over a year and a half   - 24 hours urine protein shows M protein 48.4   - We discussed amyloidosis is still in the differential and recommend fat pad biopsy which he missed and he will reschedule   - PET CT unremarkable   - At this point, no indication to treat   - Most likely MGUS   - Will monitor twice a year, then go down to once a year   - RTC in 6 months with repeat labs      RTC in 6 months  This note has been transcribed using a medical scribe and there is a possibility of unintentional typing misprints    Diagnoses and all orders for this visit:  MGUS (monoclonal  gammopathy of unknown significance)  -     Clinic Appointment Request  -     CBC and Auto Differential; Future  -     Comprehensive metabolic panel; Future  -     Comprehensive Metabolic Panel; Future  -     CBC and Auto Differential; Future  -     Immunoglobulins (IgG, IgA, IgM); Future  -     [place]/Lambda Free Light Chain, Serum; Future  -     Serum Protein Electrophoresis; Future  -     Clinic Appointment Request Virtual Est (end of day video); Future      Jacqui Hanley MD  Hematology/Oncology  [facility]      [facility] Attestation  By signing my name below, I, Devaughn Padilla, attest that this documentation has been prepared under the direction and in the presence of Jacqui Hanley MD.    Time Spent  Prep time on day of patient encounter: 5 minutes  Time spent directly with patient, family or caregiver: 15 minutes  Additional Time Spent on Patient Care Activities: 5 minutes  Documentation Time: 5 minutes  Other Time Spent: 0 minutes  Total: 30 minutes

## 2025-05-01 ENCOUNTER — OFFICE VISIT (OUTPATIENT)
Dept: HEMATOLOGY/ONCOLOGY | Facility: CLINIC | Age: 70
End: 2025-05-01
Payer: MEDICARE

## 2025-05-01 VITALS
BODY MASS INDEX: 25.15 KG/M2 | TEMPERATURE: 98.6 F | OXYGEN SATURATION: 96 % | HEART RATE: 70 BPM | DIASTOLIC BLOOD PRESSURE: 77 MMHG | RESPIRATION RATE: 16 BRPM | SYSTOLIC BLOOD PRESSURE: 131 MMHG | WEIGHT: 175.27 LBS

## 2025-05-01 DIAGNOSIS — D47.2 MGUS (MONOCLONAL GAMMOPATHY OF UNKNOWN SIGNIFICANCE): ICD-10-CM

## 2025-05-01 PROCEDURE — 3075F SYST BP GE 130 - 139MM HG: CPT | Performed by: INTERNAL MEDICINE

## 2025-05-01 PROCEDURE — 99214 OFFICE O/P EST MOD 30 MIN: CPT | Performed by: INTERNAL MEDICINE

## 2025-05-01 PROCEDURE — 3078F DIAST BP <80 MM HG: CPT | Performed by: INTERNAL MEDICINE

## 2025-05-01 PROCEDURE — 1126F AMNT PAIN NOTED NONE PRSNT: CPT | Performed by: INTERNAL MEDICINE

## 2025-05-01 PROCEDURE — G2211 COMPLEX E/M VISIT ADD ON: HCPCS | Performed by: INTERNAL MEDICINE

## 2025-05-01 PROCEDURE — 1159F MED LIST DOCD IN RCRD: CPT | Performed by: INTERNAL MEDICINE

## 2025-05-01 ASSESSMENT — PAIN SCALES - GENERAL: PAINLEVEL_OUTOF10: 0-NO PAIN

## 2025-05-07 PROBLEM — I25.118 CORONARY ARTERY DISEASE INVOLVING NATIVE CORONARY ARTERY OF NATIVE HEART WITH OTHER FORM OF ANGINA PECTORIS: Chronic | Status: ACTIVE | Noted: 2023-05-24

## 2025-05-20 ENCOUNTER — APPOINTMENT (OUTPATIENT)
Dept: PRIMARY CARE | Facility: CLINIC | Age: 70
End: 2025-05-20
Payer: MEDICARE

## 2025-05-28 PROBLEM — J01.41 ACUTE RECURRENT PANSINUSITIS: Status: RESOLVED | Noted: 2023-05-24 | Resolved: 2025-05-28

## 2025-05-29 ENCOUNTER — APPOINTMENT (OUTPATIENT)
Dept: CARDIOLOGY | Facility: CLINIC | Age: 70
End: 2025-05-29
Payer: COMMERCIAL

## 2025-05-29 VITALS
BODY MASS INDEX: 25.34 KG/M2 | HEART RATE: 87 BPM | DIASTOLIC BLOOD PRESSURE: 80 MMHG | OXYGEN SATURATION: 97 % | SYSTOLIC BLOOD PRESSURE: 144 MMHG | HEIGHT: 70 IN | WEIGHT: 177 LBS

## 2025-05-29 DIAGNOSIS — I10 BENIGN ESSENTIAL HYPERTENSION: Primary | Chronic | ICD-10-CM

## 2025-05-29 DIAGNOSIS — I25.10 CORONARY ARTERY DISEASE INVOLVING NATIVE CORONARY ARTERY OF NATIVE HEART WITHOUT ANGINA PECTORIS: Chronic | ICD-10-CM

## 2025-05-29 DIAGNOSIS — I10 PRIMARY HYPERTENSION: ICD-10-CM

## 2025-05-29 DIAGNOSIS — I48.0 PAROXYSMAL ATRIAL FIBRILLATION (MULTI): Chronic | ICD-10-CM

## 2025-05-29 DIAGNOSIS — I25.118 CORONARY ARTERY DISEASE INVOLVING NATIVE CORONARY ARTERY OF NATIVE HEART WITH OTHER FORM OF ANGINA PECTORIS: Chronic | ICD-10-CM

## 2025-05-29 DIAGNOSIS — E78.2 MIXED HYPERLIPIDEMIA: Chronic | ICD-10-CM

## 2025-05-29 PROBLEM — K21.9 GERD WITHOUT ESOPHAGITIS: Status: RESOLVED | Noted: 2023-05-24 | Resolved: 2025-05-29

## 2025-05-29 PROBLEM — E66.3 OVERWEIGHT: Status: RESOLVED | Noted: 2024-01-03 | Resolved: 2025-05-29

## 2025-05-29 PROBLEM — K40.90 RIGHT INGUINAL HERNIA: Status: RESOLVED | Noted: 2024-01-04 | Resolved: 2025-05-29

## 2025-05-29 PROBLEM — K21.9 ESOPHAGEAL REFLUX: Status: RESOLVED | Noted: 2023-05-30 | Resolved: 2025-05-29

## 2025-05-29 LAB
ATRIAL RATE: 73 BPM
P AXIS: 30 DEGREES
P OFFSET: 209 MS
P ONSET: 156 MS
PR INTERVAL: 130 MS
Q ONSET: 221 MS
QRS COUNT: 11 BEATS
QRS DURATION: 82 MS
QT INTERVAL: 380 MS
QTC CALCULATION(BAZETT): 418 MS
QTC FREDERICIA: 405 MS
R AXIS: -1 DEGREES
T AXIS: 27 DEGREES
T OFFSET: 411 MS
VENTRICULAR RATE: 73 BPM

## 2025-05-29 PROCEDURE — 1036F TOBACCO NON-USER: CPT | Performed by: INTERNAL MEDICINE

## 2025-05-29 PROCEDURE — 93005 ELECTROCARDIOGRAM TRACING: CPT | Performed by: INTERNAL MEDICINE

## 2025-05-29 PROCEDURE — 1159F MED LIST DOCD IN RCRD: CPT | Performed by: INTERNAL MEDICINE

## 2025-05-29 PROCEDURE — 3008F BODY MASS INDEX DOCD: CPT | Performed by: INTERNAL MEDICINE

## 2025-05-29 PROCEDURE — 3077F SYST BP >= 140 MM HG: CPT | Performed by: INTERNAL MEDICINE

## 2025-05-29 PROCEDURE — 99212 OFFICE O/P EST SF 10 MIN: CPT | Performed by: INTERNAL MEDICINE

## 2025-05-29 PROCEDURE — 3079F DIAST BP 80-89 MM HG: CPT | Performed by: INTERNAL MEDICINE

## 2025-05-29 PROCEDURE — 1160F RVW MEDS BY RX/DR IN RCRD: CPT | Performed by: INTERNAL MEDICINE

## 2025-05-29 PROCEDURE — 99213 OFFICE O/P EST LOW 20 MIN: CPT | Performed by: INTERNAL MEDICINE

## 2025-05-29 RX ORDER — LISINOPRIL 10 MG/1
10 TABLET ORAL DAILY
Qty: 90 TABLET | Refills: 3 | Status: SHIPPED | OUTPATIENT
Start: 2025-05-29 | End: 2026-05-29

## 2025-05-29 RX ORDER — ROSUVASTATIN CALCIUM 10 MG/1
10 TABLET, COATED ORAL DAILY
Qty: 90 TABLET | Refills: 3 | Status: SHIPPED | OUTPATIENT
Start: 2025-05-29

## 2025-05-29 NOTE — PATIENT INSTRUCTIONS
1. CAD. November 2019 status post CABG LIMA to the LAD SVG to the ramus PDA and diagonal. Doing well. Physically active.  Feeling well in regards to the heart.  Continue rosuvastatin and aspirin.    2. Hyperlipidemia.   He did develop myalgias with atorvastatin and pravastatin. I placed him on Rosuvastatin 10.  2 /3 /2025 LDL 33 HDL 61 triglycerides 266 blood sugar 89 LFTs normal.  These results are excellent    3. Hypertension blood pressures at home are much better controlled than they are here.  He is on lisinopril 10 mg.    4. Pafib- Palpitations. Minimal to none. He did have a burst of atrial fibrillation postoperatively in 2019. No recurrence.    From a cardiac standpoint he is doing well.  EKG today.  RTC 1 year.  Labs are followed by his primary care physician  
You can access the [facility] Patient Portal, offered by [facility], by registering with the following website: [email or web address]

## 2025-05-29 NOTE — PROGRESS NOTES
Referred by No ref. provider found    HPI     I am seeing Prosper for a yearly follow-up.  Overall feeling fine in regards to his heart.  He has hypoproteinemia.  He is being evaluated for some sort of cancer.  Tomorrow is going for a gastric biopsy.  No chest pain no pressure no heaviness no shortness of breath.  Weight is up 9 pounds.    Past Medical History:  Problem List Items Addressed This Visit    None     Past Medical History:   Diagnosis Date    A-fib (Multi) 05/24/2023    Atrial fibrillation (Multi) 05/24/2023    Pafib post op 2019    Benign essential hypertension 05/24/2023    CAD (coronary artery disease)     Coronary artery disease involving native coronary artery of native heart with other form of angina pectoris 05/24/2023    Elevated  Ag units   11/4/2019 CABG: LIMA to LAD, SVG to IR / PDA / Diag      HLD (hyperlipidemia)     HTN (hypertension)     Hyperlipidemia 05/24/2023    Hypertension 05/30/2023    Malignant neoplasm metastatic to bone (Multi) 11/20/2023    Paroxysmal atrial fibrillation (Multi) 05/24/2023    postop 2019      Past Surgical History:  He has a past surgical history that includes Hernia repair (Left, 2004) and Coronary artery bypass graft (11/2019).      Social History:  He reports that he has never smoked. He has never been exposed to tobacco smoke. He has never used smokeless tobacco. He reports that he does not currently use alcohol. He reports that he does not use drugs.    Family History:  Family History   Problem Relation Name Age of Onset    Coronary artery disease Brother       Allergies:  Dexamethasone, Statins-hmg-coa reductase inhibitors, and Ciprofloxacin    Outpatient Medications:  Current Outpatient Medications   Medication Instructions    lisinopril 10 mg, oral, Daily    rosuvastatin (CRESTOR) 10 mg, oral, Daily     Last Recorded Vitals:  There were no vitals filed for this visit.    Physical Exam  Patient is alert and oriented x3.  HEENT is unremarkable  mucous members are moist  Neck no JVP no bruits upstrokes are full no thyromegaly  Lungs are clear bilaterally.  No wheezing crackles or rales  Heart regular rhythm normal S1-S2 there is no S3 no murmurs are heard.  Abdomen is soft bs are positive nontender nondistended no organomegaly no pulsatile masses  Extremities have no edema.  Distal pulses present palpable.  Neuro is grossly nonfocal  Skin has no rashes     Last Labs:  CBC -  Lab Results   Component Value Date    WBC 4.9 03/17/2025    WBC 5.3 02/03/2025    HGB 15.3 03/17/2025    HGB 15.9 02/03/2025    HCT 46.0 03/17/2025    HCT 46.8 02/03/2025    MCV 99 03/17/2025    MCV 95.5 02/03/2025     03/17/2025     02/03/2025     CMP -  Lab Results   Component Value Date    CALCIUM 9.3 03/17/2025    CALCIUM 9.5 02/03/2025    PHOS 4.3 11/09/2019    PROT 5.9 (L) 03/17/2025    PROT 5.9 (L) 03/17/2025    PROT 6.4 02/03/2025    ALBUMIN 4.4 03/17/2025    ALBUMIN 4.7 02/03/2025    AST 21 03/17/2025    AST 21 02/03/2025    ALT 20 03/17/2025    ALT 23 02/03/2025    ALKPHOS 48 03/17/2025    ALKPHOS 57 02/03/2025    BILITOT 0.8 03/17/2025    BILITOT 0.8 02/03/2025     LIPID PANEL -   Lab Results   Component Value Date    CHOL 125 02/03/2025    HDL 61 02/03/2025    CHHDL 2.0 02/03/2025    VLDL 37 08/01/2024    TRIG 266 (H) 02/03/2025    NHDL 64 02/03/2025     RENAL FUNCTION PANEL -   Lab Results   Component Value Date    K 4.5 03/17/2025    K 4.9 02/03/2025    PHOS 4.3 11/09/2019     Lab Results   Component Value Date    BNP 61 10/27/2019    HGBA1C 5.0 10/29/2019     Procedure    Echo 11/13/23 EF NL, DDF, Mild - mod LAE    REGULAR STRESS [12/09/2019]: 7 min 24 sec (9.30 METs) ... Normal - Average functional capacity for age. No exercise associated cardiac symptoms. PVC's and ventricular couplets at peak exercise. Negative exercise ECG for inducible ischemia at moderate WL.      HOLTER [11/26/2019]: SR, -65. No ep/of A-fib / PSVT / high-grade AV block. Rare  PVCs w/no VT. No arry sx documented.      ECHO [11/06/2019]: EF 55-60%. Abnormal septal motion c/w postop status. Absent A-wave on MV spectral Doppler tracing c/w A-fib.      CABG x4 [11/04/2019, Dr. Briggs]: LEOBARDO-LAD, VG-DIAG, VG-RI, VG-PDA     CATH [10/28/2019]: Severe 3 vessel disease - culprit proximal LAD with ulcerated plaque. Referral for bypass consideration. Medical optimization per primary cardiology team. LVEDP = 10.      CAROTID [10/28/2019]: Less than 50% SENIA / LICA      ECHO [10/28/2019]: EF 65-70%. SD = impaired relaxation pattern of LV diastolic filling. RVSP wnl. Mild aortic valve regurgitation.      CT / SCORING [08/29/2019] = 659.66 (LM 9.17, .19, LCx 1.29, .01)     PHARM NST [09/04/2019]: Normal â€“ 55%         Assessment/Plan      1. CAD. November 2019 status post CABG LIMA to the LAD SVG to the ramus PDA and diagonal. Doing well. Physically active.  Feeling well in regards to the heart.  Continue rosuvastatin and aspirin.    2. Hyperlipidemia.   He did develop myalgias with atorvastatin and pravastatin. I placed him on Rosuvastatin 10.  2 /3 /2025 LDL 33 HDL 61 triglycerides 266 blood sugar 89 LFTs normal.  These results are excellent    3. Hypertension blood pressures at home are much better controlled than they are here.  He is on lisinopril 10 mg.    4. Pafib- Palpitations. Minimal to none. He did have a burst of atrial fibrillation postoperatively in 2019. No recurrence.    From a cardiac standpoint he is doing well.  EKG today.  RTC 1 year.  Labs are followed by his primary care physician  Jarad Huang MD     Instructions and follow up

## 2025-05-30 ENCOUNTER — HOSPITAL ENCOUNTER (OUTPATIENT)
Dept: RADIOLOGY | Facility: HOSPITAL | Age: 70
Discharge: HOME | End: 2025-05-30
Payer: MEDICARE

## 2025-05-30 VITALS
SYSTOLIC BLOOD PRESSURE: 149 MMHG | HEART RATE: 59 BPM | DIASTOLIC BLOOD PRESSURE: 84 MMHG | RESPIRATION RATE: 19 BRPM | OXYGEN SATURATION: 100 %

## 2025-05-30 DIAGNOSIS — D47.2 MGUS (MONOCLONAL GAMMOPATHY OF UNKNOWN SIGNIFICANCE): ICD-10-CM

## 2025-05-30 LAB
INR PPP: 0.9 (ref 0.9–1.1)
PLATELET # BLD AUTO: 154 X10*3/UL (ref 150–450)
PROTHROMBIN TIME: 10.4 SECONDS (ref 9.8–12.4)

## 2025-05-30 PROCEDURE — 2720000007 HC OR 272 NO HCPCS

## 2025-05-30 PROCEDURE — 76942 ECHO GUIDE FOR BIOPSY: CPT

## 2025-05-30 PROCEDURE — 2500000004 HC RX 250 GENERAL PHARMACY W/ HCPCS (ALT 636 FOR OP/ED): Mod: JZ | Performed by: RADIOLOGY

## 2025-05-30 PROCEDURE — 85049 AUTOMATED PLATELET COUNT: CPT | Performed by: RADIOLOGY

## 2025-05-30 PROCEDURE — 2500000005 HC RX 250 GENERAL PHARMACY W/O HCPCS: Performed by: RADIOLOGY

## 2025-05-30 PROCEDURE — 36415 COLL VENOUS BLD VENIPUNCTURE: CPT | Performed by: RADIOLOGY

## 2025-05-30 PROCEDURE — 85610 PROTHROMBIN TIME: CPT | Performed by: RADIOLOGY

## 2025-05-30 RX ORDER — LIDOCAINE HYDROCHLORIDE 10 MG/ML
INJECTION, SOLUTION EPIDURAL; INFILTRATION; INTRACAUDAL; PERINEURAL
Status: COMPLETED | OUTPATIENT
Start: 2025-05-30 | End: 2025-05-30

## 2025-05-30 RX ADMIN — LIDOCAINE HYDROCHLORIDE 5 ML: 10 INJECTION, SOLUTION EPIDURAL; INFILTRATION; INTRACAUDAL; PERINEURAL at 14:52

## 2025-05-30 RX ADMIN — Medication 3 L/MIN: at 14:35

## 2025-05-30 ASSESSMENT — PAIN - FUNCTIONAL ASSESSMENT
PAIN_FUNCTIONAL_ASSESSMENT: 0-10

## 2025-05-30 ASSESSMENT — PAIN SCALES - GENERAL
PAINLEVEL_OUTOF10: 0 - NO PAIN
PAINLEVEL_OUTOF10: 0 - NO PAIN

## 2025-05-30 NOTE — PROCEDURES
Interventional Radiology Brief Postprocedure Note    Attending: Jean Marie Jarrett MD    Assistant:   Staff Role   Oralia Turcios Radiology Technologist   Nupur Price, RN Radiology Nurse   Haim Self MD Radiologist   Jean Marie Jarrett MD Radiologist       Diagnosis:   1. MGUS (monoclonal gammopathy of unknown significance)  US guided percutaneous abdominal retroperitoneum biopsy    US guided percutaneous abdominal retroperitoneum biopsy    Surgical Pathology Exam    Surgical Pathology Exam          Description of procedure: US biopsy right abdominal superifical fat pad 18 G x 3    Timeout:  Yes    Procedure Area: Procedure Area     Anesthesia:   Local/Topical    Complications: None    Estimated Blood Loss: minimal    Medications (Filter: Administrations occurring from 1430 to 1510 on 05/30/25) As of 05/30/25 1510      oxygen (O2) therapy (L/min) Total volume:  Not documented* Dosing weight:  80.3   *Total volume has not been documented. View each administration to see the amount administered.     Date/Time Rate/Dose/Volume Action       05/30/25  1435 3 L/min Start      1504  Stopped               lidocaine PF (Xylocaine) 10 mg/mL (1 %) injection (mL) Total volume:  5 mL      Date/Time Rate/Dose/Volume Action       05/30/25  1452 5 mL Given                   ID Type Source Tests Collected by Time   1 : Fat pad biopsy anterior abdominal wall Tissue SOFT TISSUE MASS BIOPSY SURGICAL PATHOLOGY EXAM Haim Self MD 5/30/2025 1455         See detailed result report with images in PACS.    The patient tolerated the procedure well without incident or complication and is in stable condition.

## 2025-06-10 ENCOUNTER — APPOINTMENT (OUTPATIENT)
Dept: PRIMARY CARE | Facility: CLINIC | Age: 70
End: 2025-06-10
Payer: MEDICARE

## 2025-06-10 VITALS
OXYGEN SATURATION: 98 % | HEIGHT: 70 IN | RESPIRATION RATE: 14 BRPM | DIASTOLIC BLOOD PRESSURE: 70 MMHG | HEART RATE: 74 BPM | TEMPERATURE: 97.8 F | SYSTOLIC BLOOD PRESSURE: 110 MMHG | WEIGHT: 176 LBS | BODY MASS INDEX: 25.2 KG/M2

## 2025-06-10 DIAGNOSIS — R53.83 OTHER FATIGUE: ICD-10-CM

## 2025-06-10 DIAGNOSIS — E78.2 MIXED HYPERLIPIDEMIA: Chronic | ICD-10-CM

## 2025-06-10 DIAGNOSIS — Z00.00 PERIODIC HEALTH ASSESSMENT, GENERAL SCREENING, ADULT: ICD-10-CM

## 2025-06-10 DIAGNOSIS — Z13.220 SCREENING FOR HYPERLIPIDEMIA: ICD-10-CM

## 2025-06-10 DIAGNOSIS — I10 BENIGN ESSENTIAL HYPERTENSION: Primary | Chronic | ICD-10-CM

## 2025-06-10 DIAGNOSIS — M10.9 GOUT, UNSPECIFIED CAUSE, UNSPECIFIED CHRONICITY, UNSPECIFIED SITE: ICD-10-CM

## 2025-06-10 DIAGNOSIS — I25.118 CORONARY ARTERY DISEASE INVOLVING NATIVE CORONARY ARTERY OF NATIVE HEART WITH OTHER FORM OF ANGINA PECTORIS: Chronic | ICD-10-CM

## 2025-06-10 PROCEDURE — 3074F SYST BP LT 130 MM HG: CPT | Performed by: INTERNAL MEDICINE

## 2025-06-10 PROCEDURE — 1159F MED LIST DOCD IN RCRD: CPT | Performed by: INTERNAL MEDICINE

## 2025-06-10 PROCEDURE — 3008F BODY MASS INDEX DOCD: CPT | Performed by: INTERNAL MEDICINE

## 2025-06-10 PROCEDURE — 1036F TOBACCO NON-USER: CPT | Performed by: INTERNAL MEDICINE

## 2025-06-10 PROCEDURE — 3078F DIAST BP <80 MM HG: CPT | Performed by: INTERNAL MEDICINE

## 2025-06-10 PROCEDURE — 1160F RVW MEDS BY RX/DR IN RCRD: CPT | Performed by: INTERNAL MEDICINE

## 2025-06-10 PROCEDURE — 99214 OFFICE O/P EST MOD 30 MIN: CPT | Performed by: INTERNAL MEDICINE

## 2025-06-10 ASSESSMENT — ENCOUNTER SYMPTOMS
NAUSEA: 0
SHORTNESS OF BREATH: 0
CONSTIPATION: 0
DIARRHEA: 0
PALPITATIONS: 0
ABDOMINAL PAIN: 0
WHEEZING: 0
COUGH: 0

## 2025-06-10 ASSESSMENT — PATIENT HEALTH QUESTIONNAIRE - PHQ9
SUM OF ALL RESPONSES TO PHQ9 QUESTIONS 1 AND 2: 0
2. FEELING DOWN, DEPRESSED OR HOPELESS: NOT AT ALL
1. LITTLE INTEREST OR PLEASURE IN DOING THINGS: NOT AT ALL

## 2025-06-10 NOTE — PROGRESS NOTES
"Subjective   Patient ID: Prosper Mitchell is a 69 y.o. male who presents for Hypertension.    Overall he has been feeling well.  He said complicated medical issues within increase in the protein spike concerning for multiple myeloma or possible amyloidosis.  Further studies have not supported that and look more like MGUS.  This was discussed with him.  Additionally, he had a concerning renal mass.  He does follow with urology for this.  PET scan and further testing still seems to support this being a benign lesion.  He has been handling the stress well and denies any issues with anxiety.  States has been sleeping well.  Blood pressure has been controlled he denies any issues with chest pain, shortness of breath or dizzy spells.    Review of Systems   Respiratory:  Negative for cough, shortness of breath and wheezing.    Cardiovascular:  Negative for chest pain and palpitations.   Gastrointestinal:  Negative for abdominal pain, constipation, diarrhea and nausea.       Objective   /70 (BP Location: Left arm, Patient Position: Sitting, BP Cuff Size: Adult)   Pulse 74   Temp 36.6 °C (97.8 °F) (Tympanic)   Resp 14   Ht 1.778 m (5' 10\")   Wt 79.8 kg (176 lb)   SpO2 98%   BMI 25.25 kg/m²     Physical Exam  Vitals reviewed.   Constitutional:       Appearance: Normal appearance.   HENT:      Head: Normocephalic.   Cardiovascular:      Rate and Rhythm: Normal rate.   Pulmonary:      Effort: Pulmonary effort is normal.   Musculoskeletal:         General: Normal range of motion.   Neurological:      General: No focal deficit present.      Mental Status: He is alert.   Psychiatric:         Mood and Affect: Mood normal.         Assessment/Plan   Problem List Items Addressed This Visit           ICD-10-CM    Benign essential hypertension - Primary (Chronic) I10    Relevant Orders    Comprehensive Metabolic Panel    Coronary artery disease involving native coronary artery of native heart with other form of angina " pectoris (Chronic) I25.118    Hyperlipidemia (Chronic) E78.5    Fatigue R53.83    Relevant Orders    CBC    Thyroid Stimulating Hormone     Other Visit Diagnoses         Codes      Screening for hyperlipidemia     Z13.220    Relevant Orders    Lipid Panel      Gout, unspecified cause, unspecified chronicity, unspecified site     M10.9    Relevant Orders    Uric Acid      Periodic health assessment, general screening, adult     Z00.00    Relevant Orders    Comprehensive Metabolic Panel    CBC    Thyroid Stimulating Hormone    Uric Acid        Reviewed and discussed the above.  Elevated plasma proteins seemingly consistent with MGUS.  All studies were reviewed and discussed and all questions were answered.  He does follow with hematology as well.  Renal mass which also has a benign appearance.  He does follow with urology for periodic imaging.  Hypertension is controlled.  Hyperlipidemia without the need for medication currently.  We did discuss diet and exercise.  He does have a strict no meat diet.  We discussed the need for B12 supplementation as well as efforts to get enough protein in his diet.  Will follow-up in about 6 months, sooner if any issues or changes.

## 2025-06-30 ENCOUNTER — TELEPHONE (OUTPATIENT)
Dept: HEMATOLOGY/ONCOLOGY | Facility: CLINIC | Age: 70
End: 2025-06-30
Payer: MEDICARE

## 2025-06-30 NOTE — TELEPHONE ENCOUNTER
Per Dr. Talon MCCALL to call patient and update that fat pad biopsy was unremarkable, will continue with follow up in November.     I called patient and provided this update, he was appreciative and had no further questions. Reviewed 11/3 appointment time and location.

## 2025-08-18 ENCOUNTER — HOSPITAL ENCOUNTER (OUTPATIENT)
Dept: RADIOLOGY | Facility: HOSPITAL | Age: 70
Discharge: HOME | End: 2025-08-18
Payer: MEDICARE

## 2025-08-18 DIAGNOSIS — D49.512 NEOPLASM OF UNSPECIFIED BEHAVIOR OF LEFT KIDNEY: ICD-10-CM

## 2025-08-18 PROCEDURE — 74183 MRI ABD W/O CNTR FLWD CNTR: CPT

## 2025-08-18 PROCEDURE — A9575 INJ GADOTERATE MEGLUMI 0.1ML: HCPCS | Mod: JW | Performed by: UROLOGY

## 2025-08-18 PROCEDURE — 2550000001 HC RX 255 CONTRASTS: Mod: JW | Performed by: UROLOGY

## 2025-08-18 RX ORDER — GADOTERATE MEGLUMINE 376.9 MG/ML
15 INJECTION INTRAVENOUS
Status: COMPLETED | OUTPATIENT
Start: 2025-08-18 | End: 2025-08-18

## 2025-08-18 RX ADMIN — GADOTERATE MEGLUMINE 15 ML: 376.9 INJECTION INTRAVENOUS at 14:50

## 2025-09-06 ENCOUNTER — HOSPITAL ENCOUNTER (OUTPATIENT)
Facility: HOSPITAL | Age: 70
Setting detail: OBSERVATION
Discharge: HOME | End: 2025-09-07
Admitting: INTERNAL MEDICINE
Payer: MEDICARE

## 2025-09-06 DIAGNOSIS — R07.9 CHEST PAIN, UNSPECIFIED TYPE: Primary | ICD-10-CM

## 2025-09-06 DIAGNOSIS — I25.118 CORONARY ARTERY DISEASE INVOLVING NATIVE CORONARY ARTERY OF NATIVE HEART WITH OTHER FORM OF ANGINA PECTORIS: Chronic | ICD-10-CM

## 2025-09-06 DIAGNOSIS — I10 BENIGN ESSENTIAL HYPERTENSION: Chronic | ICD-10-CM

## 2025-09-06 DIAGNOSIS — I48.0 PAROXYSMAL ATRIAL FIBRILLATION (MULTI): Chronic | ICD-10-CM

## 2025-09-06 PROBLEM — D41.02 NEOPLASM OF UNCERTAIN BEHAVIOR OF LEFT KIDNEY: Status: RESOLVED | Noted: 2024-05-07 | Resolved: 2025-09-06

## 2025-09-06 LAB
ALBUMIN SERPL BCP-MCNC: 4.5 G/DL (ref 3.4–5)
ALP SERPL-CCNC: 53 U/L (ref 33–136)
ALT SERPL W P-5'-P-CCNC: 31 U/L (ref 10–52)
ANION GAP SERPL CALC-SCNC: 10 MMOL/L (ref 10–20)
AST SERPL W P-5'-P-CCNC: 30 U/L (ref 9–39)
BASOPHILS # BLD AUTO: 0.03 X10*3/UL (ref 0–0.1)
BASOPHILS NFR BLD AUTO: 0.5 %
BILIRUB SERPL-MCNC: 0.5 MG/DL (ref 0–1.2)
BUN SERPL-MCNC: 19 MG/DL (ref 6–23)
CALCIUM SERPL-MCNC: 9.3 MG/DL (ref 8.6–10.3)
CARDIAC TROPONIN I PNL SERPL HS: 4 NG/L (ref 0–20)
CARDIAC TROPONIN I PNL SERPL HS: 5 NG/L (ref 0–20)
CHLORIDE SERPL-SCNC: 102 MMOL/L (ref 98–107)
CO2 SERPL-SCNC: 30 MMOL/L (ref 21–32)
CREAT SERPL-MCNC: 1.03 MG/DL (ref 0.5–1.3)
EGFRCR SERPLBLD CKD-EPI 2021: 79 ML/MIN/1.73M*2
EOSINOPHIL # BLD AUTO: 0.2 X10*3/UL (ref 0–0.7)
EOSINOPHIL NFR BLD AUTO: 3 %
ERYTHROCYTE [DISTWIDTH] IN BLOOD BY AUTOMATED COUNT: 12.2 % (ref 11.5–14.5)
GLUCOSE SERPL-MCNC: 189 MG/DL (ref 74–99)
HCT VFR BLD AUTO: 46.2 % (ref 41–52)
HGB BLD-MCNC: 15.4 G/DL (ref 13.5–17.5)
HOLD SPECIMEN: NORMAL
IMM GRANULOCYTES # BLD AUTO: 0.02 X10*3/UL (ref 0–0.7)
IMM GRANULOCYTES NFR BLD AUTO: 0.3 % (ref 0–0.9)
LYMPHOCYTES # BLD AUTO: 1.58 X10*3/UL (ref 1.2–4.8)
LYMPHOCYTES NFR BLD AUTO: 24 %
MCH RBC QN AUTO: 32.5 PG (ref 26–34)
MCHC RBC AUTO-ENTMCNC: 33.3 G/DL (ref 32–36)
MCV RBC AUTO: 98 FL (ref 80–100)
MONOCYTES # BLD AUTO: 0.55 X10*3/UL (ref 0.1–1)
MONOCYTES NFR BLD AUTO: 8.4 %
NEUTROPHILS # BLD AUTO: 4.19 X10*3/UL (ref 1.2–7.7)
NEUTROPHILS NFR BLD AUTO: 63.8 %
NRBC BLD-RTO: 0 /100 WBCS (ref 0–0)
PLATELET # BLD AUTO: 161 X10*3/UL (ref 150–450)
POTASSIUM SERPL-SCNC: 4.2 MMOL/L (ref 3.5–5.3)
PROT SERPL-MCNC: 6.6 G/DL (ref 6.4–8.2)
RBC # BLD AUTO: 4.74 X10*6/UL (ref 4.5–5.9)
SODIUM SERPL-SCNC: 138 MMOL/L (ref 136–145)
WBC # BLD AUTO: 6.6 X10*3/UL (ref 4.4–11.3)

## 2025-09-06 PROCEDURE — 36415 COLL VENOUS BLD VENIPUNCTURE: CPT

## 2025-09-06 PROCEDURE — 99285 EMERGENCY DEPT VISIT HI MDM: CPT

## 2025-09-06 PROCEDURE — G0378 HOSPITAL OBSERVATION PER HR: HCPCS

## 2025-09-06 PROCEDURE — 99222 1ST HOSP IP/OBS MODERATE 55: CPT | Performed by: INTERNAL MEDICINE

## 2025-09-06 PROCEDURE — 84484 ASSAY OF TROPONIN QUANT: CPT

## 2025-09-06 PROCEDURE — 85025 COMPLETE CBC W/AUTO DIFF WBC: CPT | Performed by: EMERGENCY MEDICINE

## 2025-09-06 PROCEDURE — 80053 COMPREHEN METABOLIC PANEL: CPT

## 2025-09-06 PROCEDURE — 85025 COMPLETE CBC W/AUTO DIFF WBC: CPT

## 2025-09-06 PROCEDURE — 2500000001 HC RX 250 WO HCPCS SELF ADMINISTERED DRUGS (ALT 637 FOR MEDICARE OP)

## 2025-09-06 PROCEDURE — 36415 COLL VENOUS BLD VENIPUNCTURE: CPT | Performed by: EMERGENCY MEDICINE

## 2025-09-06 RX ORDER — ACETAMINOPHEN 650 MG/1
650 SUPPOSITORY RECTAL EVERY 4 HOURS PRN
Status: DISCONTINUED | OUTPATIENT
Start: 2025-09-06 | End: 2025-09-07 | Stop reason: HOSPADM

## 2025-09-06 RX ORDER — NAPROXEN SODIUM 220 MG/1
324 TABLET, FILM COATED ORAL ONCE
Status: COMPLETED | OUTPATIENT
Start: 2025-09-06 | End: 2025-09-06

## 2025-09-06 RX ORDER — ENOXAPARIN SODIUM 100 MG/ML
40 INJECTION SUBCUTANEOUS EVERY 24 HOURS
Status: DISCONTINUED | OUTPATIENT
Start: 2025-09-07 | End: 2025-09-07 | Stop reason: HOSPADM

## 2025-09-06 RX ORDER — ACETAMINOPHEN 325 MG/1
650 TABLET ORAL EVERY 4 HOURS PRN
Status: DISCONTINUED | OUTPATIENT
Start: 2025-09-06 | End: 2025-09-07 | Stop reason: HOSPADM

## 2025-09-06 RX ORDER — TALC
3 POWDER (GRAM) TOPICAL NIGHTLY PRN
Status: DISCONTINUED | OUTPATIENT
Start: 2025-09-06 | End: 2025-09-07 | Stop reason: HOSPADM

## 2025-09-06 RX ORDER — NAPROXEN SODIUM 220 MG/1
81 TABLET, FILM COATED ORAL DAILY
Status: DISCONTINUED | OUTPATIENT
Start: 2025-09-07 | End: 2025-09-07 | Stop reason: HOSPADM

## 2025-09-06 RX ORDER — ACETAMINOPHEN 160 MG/5ML
650 SOLUTION ORAL EVERY 4 HOURS PRN
Status: DISCONTINUED | OUTPATIENT
Start: 2025-09-06 | End: 2025-09-07 | Stop reason: HOSPADM

## 2025-09-06 RX ORDER — NITROGLYCERIN 0.4 MG/1
0.4 TABLET SUBLINGUAL ONCE
Status: COMPLETED | OUTPATIENT
Start: 2025-09-06 | End: 2025-09-06

## 2025-09-06 RX ORDER — ROSUVASTATIN CALCIUM 10 MG/1
10 TABLET, COATED ORAL DAILY
Status: DISCONTINUED | OUTPATIENT
Start: 2025-09-07 | End: 2025-09-07 | Stop reason: HOSPADM

## 2025-09-06 RX ORDER — POLYETHYLENE GLYCOL 3350 17 G/17G
17 POWDER, FOR SOLUTION ORAL DAILY PRN
Status: DISCONTINUED | OUTPATIENT
Start: 2025-09-06 | End: 2025-09-07 | Stop reason: HOSPADM

## 2025-09-06 RX ORDER — LISINOPRIL 10 MG/1
10 TABLET ORAL DAILY
Status: DISCONTINUED | OUTPATIENT
Start: 2025-09-07 | End: 2025-09-07 | Stop reason: HOSPADM

## 2025-09-06 RX ADMIN — ASPIRIN 81 MG CHEWABLE TABLET 324 MG: 81 TABLET CHEWABLE at 20:36

## 2025-09-06 RX ADMIN — NITROGLYCERIN 0.4 MG: 0.4 TABLET SUBLINGUAL at 20:36

## 2025-09-06 SDOH — SOCIAL STABILITY: SOCIAL INSECURITY: WITHIN THE LAST YEAR, HAVE YOU BEEN AFRAID OF YOUR PARTNER OR EX-PARTNER?: NO

## 2025-09-06 SDOH — SOCIAL STABILITY: SOCIAL INSECURITY
WITHIN THE LAST YEAR, HAVE YOU BEEN KICKED, HIT, SLAPPED, OR OTHERWISE PHYSICALLY HURT BY YOUR PARTNER OR EX-PARTNER?: NO

## 2025-09-06 SDOH — ECONOMIC STABILITY: FOOD INSECURITY: WITHIN THE PAST 12 MONTHS, THE FOOD YOU BOUGHT JUST DIDN'T LAST AND YOU DIDN'T HAVE MONEY TO GET MORE.: NEVER TRUE

## 2025-09-06 SDOH — SOCIAL STABILITY: SOCIAL INSECURITY: WITHIN THE LAST YEAR, HAVE YOU BEEN HUMILIATED OR EMOTIONALLY ABUSED IN OTHER WAYS BY YOUR PARTNER OR EX-PARTNER?: NO

## 2025-09-06 SDOH — ECONOMIC STABILITY: HOUSING INSECURITY: IN THE LAST 12 MONTHS, WAS THERE A TIME WHEN YOU WERE NOT ABLE TO PAY THE MORTGAGE OR RENT ON TIME?: NO

## 2025-09-06 SDOH — SOCIAL STABILITY: SOCIAL INSECURITY: HAVE YOU HAD ANY THOUGHTS OF HARMING ANYONE ELSE?: NO

## 2025-09-06 SDOH — ECONOMIC STABILITY: TRANSPORTATION INSECURITY: IN THE PAST 12 MONTHS, HAS LACK OF TRANSPORTATION KEPT YOU FROM MEDICAL APPOINTMENTS OR FROM GETTING MEDICATIONS?: NO

## 2025-09-06 SDOH — ECONOMIC STABILITY: HOUSING INSECURITY: AT ANY TIME IN THE PAST 12 MONTHS, WERE YOU HOMELESS OR LIVING IN A SHELTER (INCLUDING NOW)?: NO

## 2025-09-06 SDOH — ECONOMIC STABILITY: FOOD INSECURITY: HOW HARD IS IT FOR YOU TO PAY FOR THE VERY BASICS LIKE FOOD, HOUSING, MEDICAL CARE, AND HEATING?: NOT HARD AT ALL

## 2025-09-06 SDOH — SOCIAL STABILITY: SOCIAL INSECURITY: DOES ANYONE TRY TO KEEP YOU FROM HAVING/CONTACTING OTHER FRIENDS OR DOING THINGS OUTSIDE YOUR HOME?: NO

## 2025-09-06 SDOH — ECONOMIC STABILITY: INCOME INSECURITY: IN THE PAST 12 MONTHS HAS THE ELECTRIC, GAS, OIL, OR WATER COMPANY THREATENED TO SHUT OFF SERVICES IN YOUR HOME?: NO

## 2025-09-06 SDOH — SOCIAL STABILITY: SOCIAL INSECURITY
WITHIN THE LAST YEAR, HAVE YOU BEEN RAPED OR FORCED TO HAVE ANY KIND OF SEXUAL ACTIVITY BY YOUR PARTNER OR EX-PARTNER?: NO

## 2025-09-06 SDOH — ECONOMIC STABILITY: HOUSING INSECURITY: IN THE PAST 12 MONTHS, HOW MANY TIMES HAVE YOU MOVED WHERE YOU WERE LIVING?: 0

## 2025-09-06 SDOH — ECONOMIC STABILITY: FOOD INSECURITY: WITHIN THE PAST 12 MONTHS, YOU WORRIED THAT YOUR FOOD WOULD RUN OUT BEFORE YOU GOT THE MONEY TO BUY MORE.: NEVER TRUE

## 2025-09-06 SDOH — SOCIAL STABILITY: SOCIAL INSECURITY: DO YOU FEEL ANYONE HAS EXPLOITED OR TAKEN ADVANTAGE OF YOU FINANCIALLY OR OF YOUR PERSONAL PROPERTY?: NO

## 2025-09-06 SDOH — SOCIAL STABILITY: SOCIAL INSECURITY: HAS ANYONE EVER THREATENED TO HURT YOUR FAMILY OR YOUR PETS?: NO

## 2025-09-06 SDOH — SOCIAL STABILITY: SOCIAL INSECURITY: DO YOU FEEL UNSAFE GOING BACK TO THE PLACE WHERE YOU ARE LIVING?: NO

## 2025-09-06 SDOH — SOCIAL STABILITY: SOCIAL INSECURITY: ARE YOU OR HAVE YOU BEEN THREATENED OR ABUSED PHYSICALLY, EMOTIONALLY, OR SEXUALLY BY ANYONE?: NO

## 2025-09-06 SDOH — SOCIAL STABILITY: SOCIAL INSECURITY: ABUSE: ADULT

## 2025-09-06 SDOH — SOCIAL STABILITY: SOCIAL INSECURITY: WERE YOU ABLE TO COMPLETE ALL THE BEHAVIORAL HEALTH SCREENINGS?: YES

## 2025-09-06 SDOH — SOCIAL STABILITY: SOCIAL INSECURITY: ARE THERE ANY APPARENT SIGNS OF INJURIES/BEHAVIORS THAT COULD BE RELATED TO ABUSE/NEGLECT?: NO

## 2025-09-06 SDOH — SOCIAL STABILITY: SOCIAL INSECURITY: HAVE YOU HAD THOUGHTS OF HARMING ANYONE ELSE?: NO

## 2025-09-06 ASSESSMENT — ENCOUNTER SYMPTOMS
HEMATURIA: 0
CONSTIPATION: 0
FEVER: 0
RHINORRHEA: 0
HALLUCINATIONS: 0
CONFUSION: 0
CHILLS: 0
DIZZINESS: 0
MYALGIAS: 0
COUGH: 0
DYSURIA: 0
SORE THROAT: 0
DIARRHEA: 0
ARTHRALGIAS: 0
NAUSEA: 0
CHEST TIGHTNESS: 1
WOUND: 0
VOMITING: 0
SHORTNESS OF BREATH: 0
PALPITATIONS: 0

## 2025-09-06 ASSESSMENT — LIFESTYLE VARIABLES
HAVE YOU EVER FELT YOU SHOULD CUT DOWN ON YOUR DRINKING: NO
EVER HAD A DRINK FIRST THING IN THE MORNING TO STEADY YOUR NERVES TO GET RID OF A HANGOVER: NO
AUDIT-C TOTAL SCORE: 0
TOTAL SCORE: 0
AUDIT-C TOTAL SCORE: 0
HOW MANY STANDARD DRINKS CONTAINING ALCOHOL DO YOU HAVE ON A TYPICAL DAY: PATIENT DOES NOT DRINK
HOW OFTEN DO YOU HAVE A DRINK CONTAINING ALCOHOL: NEVER
HOW OFTEN DO YOU HAVE 6 OR MORE DRINKS ON ONE OCCASION: NEVER
HAVE PEOPLE ANNOYED YOU BY CRITICIZING YOUR DRINKING: NO
EVER FELT BAD OR GUILTY ABOUT YOUR DRINKING: NO
SKIP TO QUESTIONS 9-10: 1

## 2025-09-06 ASSESSMENT — ACTIVITIES OF DAILY LIVING (ADL)
WALKS IN HOME: INDEPENDENT
DRESSING YOURSELF: INDEPENDENT
BATHING: INDEPENDENT
GROOMING: INDEPENDENT
LACK_OF_TRANSPORTATION: NO
ADEQUATE_TO_COMPLETE_ADL: YES
TOILETING: INDEPENDENT
LACK_OF_TRANSPORTATION: NO
HEARING - RIGHT EAR: FUNCTIONAL
HEARING - LEFT EAR: FUNCTIONAL
PATIENT'S MEMORY ADEQUATE TO SAFELY COMPLETE DAILY ACTIVITIES?: YES
JUDGMENT_ADEQUATE_SAFELY_COMPLETE_DAILY_ACTIVITIES: YES
FEEDING YOURSELF: INDEPENDENT

## 2025-09-06 ASSESSMENT — PAIN SCALES - GENERAL: PAINLEVEL_OUTOF10: 5 - MODERATE PAIN

## 2025-09-06 ASSESSMENT — HEART SCORE
HEART SCORE: 6
RISK FACTORS: >2 RISK FACTORS OR HX OF ATHEROSCLEROTIC DISEASE
AGE: 65+
TROPONIN: LESS THAN OR EQUAL TO NORMAL LIMIT
ECG: NON-SPECIFIC REPOLARIZATION DISTURBANCE
HISTORY: MODERATELY SUSPICIOUS

## 2025-09-06 ASSESSMENT — PAIN DESCRIPTION - LOCATION: LOCATION: CHEST

## 2025-09-06 ASSESSMENT — PAIN DESCRIPTION - PAIN TYPE: TYPE: ACUTE PAIN

## 2025-09-06 ASSESSMENT — COGNITIVE AND FUNCTIONAL STATUS - GENERAL: PATIENT BASELINE BEDBOUND: YES

## 2025-09-06 ASSESSMENT — PAIN - FUNCTIONAL ASSESSMENT: PAIN_FUNCTIONAL_ASSESSMENT: 0-10

## 2025-09-07 VITALS
HEART RATE: 61 BPM | SYSTOLIC BLOOD PRESSURE: 132 MMHG | BODY MASS INDEX: 26.48 KG/M2 | HEIGHT: 70 IN | OXYGEN SATURATION: 96 % | DIASTOLIC BLOOD PRESSURE: 77 MMHG | TEMPERATURE: 97.7 F | RESPIRATION RATE: 18 BRPM | WEIGHT: 185 LBS

## 2025-09-07 PROBLEM — I25.10 CORONARY ARTERY DISEASE: Status: ACTIVE | Noted: 2025-09-07

## 2025-09-07 PROBLEM — R07.9 CHEST PAIN, UNSPECIFIED TYPE: Status: RESOLVED | Noted: 2025-09-06 | Resolved: 2025-09-07

## 2025-09-07 PROBLEM — I48.0 PAROXYSMAL ATRIAL FIBRILLATION (MULTI): Chronic | Status: RESOLVED | Noted: 2023-05-24 | Resolved: 2025-09-07

## 2025-09-07 PROBLEM — I10 BENIGN ESSENTIAL HYPERTENSION: Chronic | Status: RESOLVED | Noted: 2023-05-24 | Resolved: 2025-09-07

## 2025-09-07 PROBLEM — R07.9 CHEST PAIN: Status: RESOLVED | Noted: 2023-05-24 | Resolved: 2025-09-07

## 2025-09-07 LAB
ANION GAP SERPL CALC-SCNC: 9 MMOL/L (ref 10–20)
BUN SERPL-MCNC: 17 MG/DL (ref 6–23)
CALCIUM SERPL-MCNC: 8.9 MG/DL (ref 8.6–10.3)
CHLORIDE SERPL-SCNC: 105 MMOL/L (ref 98–107)
CO2 SERPL-SCNC: 31 MMOL/L (ref 21–32)
CREAT SERPL-MCNC: 0.97 MG/DL (ref 0.5–1.3)
EGFRCR SERPLBLD CKD-EPI 2021: 85 ML/MIN/1.73M*2
ERYTHROCYTE [DISTWIDTH] IN BLOOD BY AUTOMATED COUNT: 12.6 % (ref 11.5–14.5)
GLUCOSE SERPL-MCNC: 88 MG/DL (ref 74–99)
HCT VFR BLD AUTO: 44.4 % (ref 41–52)
HGB BLD-MCNC: 15.1 G/DL (ref 13.5–17.5)
MCH RBC QN AUTO: 32.7 PG (ref 26–34)
MCHC RBC AUTO-ENTMCNC: 34 G/DL (ref 32–36)
MCV RBC AUTO: 96 FL (ref 80–100)
NRBC BLD-RTO: 0 /100 WBCS (ref 0–0)
PLATELET # BLD AUTO: 147 X10*3/UL (ref 150–450)
POTASSIUM SERPL-SCNC: 4.2 MMOL/L (ref 3.5–5.3)
RBC # BLD AUTO: 4.62 X10*6/UL (ref 4.5–5.9)
SODIUM SERPL-SCNC: 141 MMOL/L (ref 136–145)
WBC # BLD AUTO: 5.8 X10*3/UL (ref 4.4–11.3)

## 2025-09-07 PROCEDURE — 85027 COMPLETE CBC AUTOMATED: CPT | Performed by: INTERNAL MEDICINE

## 2025-09-07 PROCEDURE — 36415 COLL VENOUS BLD VENIPUNCTURE: CPT | Performed by: INTERNAL MEDICINE

## 2025-09-07 PROCEDURE — 80048 BASIC METABOLIC PNL TOTAL CA: CPT | Performed by: INTERNAL MEDICINE

## 2025-09-07 PROCEDURE — G0378 HOSPITAL OBSERVATION PER HR: HCPCS

## 2025-09-07 PROCEDURE — 2500000002 HC RX 250 W HCPCS SELF ADMINISTERED DRUGS (ALT 637 FOR MEDICARE OP, ALT 636 FOR OP/ED): Performed by: INTERNAL MEDICINE

## 2025-09-07 PROCEDURE — 2500000001 HC RX 250 WO HCPCS SELF ADMINISTERED DRUGS (ALT 637 FOR MEDICARE OP): Performed by: INTERNAL MEDICINE

## 2025-09-07 RX ORDER — NITROGLYCERIN 0.4 MG/1
0.4 TABLET SUBLINGUAL EVERY 5 MIN PRN
Status: DISCONTINUED | OUTPATIENT
Start: 2025-09-07 | End: 2025-09-07 | Stop reason: HOSPADM

## 2025-09-07 RX ADMIN — ASPIRIN 81 MG CHEWABLE TABLET 81 MG: 81 TABLET CHEWABLE at 08:09

## 2025-09-07 RX ADMIN — ROSUVASTATIN CALCIUM 10 MG: 10 TABLET, FILM COATED ORAL at 08:09

## 2025-09-07 RX ADMIN — LISINOPRIL 10 MG: 10 TABLET ORAL at 08:09

## 2025-09-07 ASSESSMENT — COGNITIVE AND FUNCTIONAL STATUS - GENERAL
DAILY ACTIVITIY SCORE: 24
MOBILITY SCORE: 24

## 2025-09-07 ASSESSMENT — PAIN - FUNCTIONAL ASSESSMENT: PAIN_FUNCTIONAL_ASSESSMENT: 0-10

## 2025-09-07 ASSESSMENT — PAIN SCALES - GENERAL: PAINLEVEL_OUTOF10: 0 - NO PAIN

## 2026-02-12 ENCOUNTER — APPOINTMENT (OUTPATIENT)
Dept: PRIMARY CARE | Facility: CLINIC | Age: 71
End: 2026-02-12
Payer: MEDICARE

## (undated) DEVICE — SYRINGE, 20 CC, LUER SLIP

## (undated) DEVICE — DRAPE, ARM XI

## (undated) DEVICE — BANDAGE, GAUZE, CONFORMING, KERLIX, 6 PLY, 4.5 IN X 4.1 YD

## (undated) DEVICE — DRIVER, MEGA NEEDLE

## (undated) DEVICE — GRASPER TIP, MODIFIED RAPTOR, 5MM, DISP

## (undated) DEVICE — SLEEVE, VASO PRESS, CALF GARMENT, MEDIUM, GREEN

## (undated) DEVICE — Device

## (undated) DEVICE — SCISSORS, MONOPOLAR, CURVED, 8MM

## (undated) DEVICE — SEAL, UNIVERSAL 5-8MM  XI

## (undated) DEVICE — DRAPE, COLUMN, DAVINCI XI

## (undated) DEVICE — OBTURATOR, BLADELESS , SU

## (undated) DEVICE — GRASPER,FORCE, BIPOLAR, DAVINCI XI

## (undated) DEVICE — DRAPE, SHEET, THREE QUARTER, FAN FOLD, 57 X 77 IN

## (undated) DEVICE — GOWN, ASTOUND, XL

## (undated) DEVICE — CARE KIT, LAPAROSCOPIC, ADVANCED

## (undated) DEVICE — COVER, TIP HOT SHEARS ENDOWRIST

## (undated) DEVICE — APPLICATOR, CHLORAPREP, W/ORANGE TINT, 26ML